# Patient Record
Sex: FEMALE | Race: WHITE | NOT HISPANIC OR LATINO | Employment: OTHER | URBAN - METROPOLITAN AREA
[De-identification: names, ages, dates, MRNs, and addresses within clinical notes are randomized per-mention and may not be internally consistent; named-entity substitution may affect disease eponyms.]

---

## 2017-01-20 ENCOUNTER — ALLSCRIPTS OFFICE VISIT (OUTPATIENT)
Dept: OTHER | Facility: OTHER | Age: 82
End: 2017-01-20

## 2017-02-22 ENCOUNTER — ALLSCRIPTS OFFICE VISIT (OUTPATIENT)
Dept: OTHER | Facility: OTHER | Age: 82
End: 2017-02-22

## 2017-03-23 ENCOUNTER — ALLSCRIPTS OFFICE VISIT (OUTPATIENT)
Dept: OTHER | Facility: OTHER | Age: 82
End: 2017-03-23

## 2017-03-27 ENCOUNTER — GENERIC CONVERSION - ENCOUNTER (OUTPATIENT)
Dept: OTHER | Facility: OTHER | Age: 82
End: 2017-03-27

## 2017-06-26 ENCOUNTER — ALLSCRIPTS OFFICE VISIT (OUTPATIENT)
Dept: OTHER | Facility: OTHER | Age: 82
End: 2017-06-26

## 2017-10-31 ENCOUNTER — ALLSCRIPTS OFFICE VISIT (OUTPATIENT)
Dept: OTHER | Facility: OTHER | Age: 82
End: 2017-10-31

## 2018-01-31 ENCOUNTER — CLINICAL SUPPORT (OUTPATIENT)
Dept: CARDIOLOGY CLINIC | Facility: CLINIC | Age: 83
End: 2018-01-31
Payer: COMMERCIAL

## 2018-01-31 DIAGNOSIS — Z95.818 PRESENCE OF OTHER CARDIAC IMPLANTS AND GRAFTS: ICD-10-CM

## 2018-01-31 DIAGNOSIS — Z86.73 PERSONAL HISTORY OF TRANSIENT ISCHEMIC ATTACK: Primary | ICD-10-CM

## 2018-01-31 PROCEDURE — 93299 PR REM INTERROG ICPMS/SCRMS <30 D TECH REVIEW: CPT | Performed by: INTERNAL MEDICINE

## 2018-01-31 PROCEDURE — 93298 REM INTERROG DEV EVAL SCRMS: CPT | Performed by: INTERNAL MEDICINE

## 2018-01-31 NOTE — PROGRESS NOTES
CARELINK TRANSMISSION: LOOP RECORDER  PRESENTING RHYTHM NSR @ 67 BPM  BATTERY STATUS "OK"  4 PAUSE AND 26 KATHY EPISODES W/ EGRAMS SHOWING UNDERSENSING  112 AF EPISODES W/ EGRAMS SHOWING SR W/ PACs ,PVCs AND PROBABLE AF @ 45-80 BPM  LONGEST EPISODE 4:32 HOURS  AF BURDEN=0 5%  PT TAKES ASA 81 AND XARELTO  NO PATIENT  ACTIVATED EPISODES  NORMAL DEVICE FUNCTION  DL       Current Outpatient Prescriptions:     amLODIPine (NORVASC) 5 mg tablet, 7 5 mg oral once daily(take 1 tablet of 5 mg and 1 tablet of 2 5 mg, Disp: 30 tablet, Rfl: 0    aspirin 81 MG tablet, Take 81 mg by mouth daily  , Disp: , Rfl:     atorvastatin (LIPITOR) 40 mg tablet, Take 40 mg by mouth daily  , Disp: , Rfl:     levothyroxine 100 mcg tablet, Take 100 mcg by mouth daily  , Disp: , Rfl:     Multiple Vitamins-Minerals (PRESERVISION AREDS 2 PO), Take by mouth daily  , Disp: , Rfl:     pantoprazole (PROTONIX) 40 mg tablet, Take 40 mg by mouth daily  , Disp: , Rfl:     rivaroxaban (XARELTO) tablet, Take 15 mg by mouth daily  , Disp: , Rfl:

## 2018-03-07 NOTE — PROGRESS NOTES
"  Discussion/Summary  Normal device function      Results/Data  Results   Cardiac Device Remote [id number] 07:49AM Jennifer Hutchison     Test Name Result Flag Reference   MISCELLANEOUS COMMENT (Report)     CARELINK TRANSMISSION (LOOP RECORDER);[id number] STATUS "OK"; PRESENTING = NSR WITH INTACT A-V CONDUCTION; NO PATIENT ACTIVATED EPISODES; (3) DEVICE CLASSIFIED "PAUSE" EPISODES, (2) DEVICE CLASSIFIED "KATHY" EPISODES & (276) DEVICE CLASSIFIED "AF" EPISODES (3 7% BURDEN) FOR UNDERSENSING, NSR & SB w/ PAC'S, PVC, PAF; PT WITH HX OF SAME - TAKES XARELTO; NORMAL DEVICE FUNCTION  eb   Cardiac Electrophysiology Report      wrppkyhvvtcedoflqbgtdksjmh3gqdz8c89j[id number]}  pdf   DEVICE TYPE Monitor       Cardiac Electrophysiology Report [id number] 07:49AM Jennifer Hutchison     Test Name Result Flag Reference   Cardiac Electrophysiology Report      hgungcoubfyfxczmegehzgsoeg6ykny2l60qy5b[id number]  pdf     Signatures   Electronically signed by : Elsa Houston, ; May 23 2016  2:58PM EST                       (Author)    Electronically signed by : Bhavya Vallejo DO; May 25 2016  8:00PM EST                       (Author)    "

## 2018-03-07 NOTE — PROGRESS NOTES
"  Discussion/Summary  Normal device function      Results/Data  Cardiac Device Remote [id number] 06:03PM Liliane Brewster     Test Name Result Flag Reference   MISCELLANEOUS COMMENT      CARELINK TRANSMISSION: (LOOP)X0A3 PAUSE EPISODES DETECTED  EGMS SHOW UNDERSENSING OF PVC'S  240 AF EPISODES DETECTED (3% BURDEN)  EGMS SHOW PAC AND PVC  PATIENT IS ON [place]  BATTERY STATUS "OK" ---JIMÉNEZ   Cardiac Electrophysiology Report      slh[id number]  pdf   DEVICE TYPE Monitor       Cardiac Electrophysiology Report [id number] 06:03PM Liliane Brewster     Test Name Result Flag Reference   Cardiac Electrophysiology Report      rcojcmoneeatcfcmlbumzsqwpa6kjcp2y80yc0t74v8j4[id number] pdf     Signatures   Electronically signed by : Destinee Shoemaker, ; Feb 23 2017  3:48PM EST                       (Author)    Electronically signed by : Jose Luis Elder, DO; Mar  3 2017  1:02PM EST                       (Author)    "

## 2018-03-07 NOTE — PROGRESS NOTES
"  Discussion/Summary  Normal device function      Results/Data  Results   Cardiac Device Remote [id number] 07:29PM Zo Busing     Test Name Result Flag Reference   MISCELLANEOUS COMMENT      CARELINK TRANSMISSION: (JOPM)E1V484 AF EPISODES DETECTED  ALL ARE NSR WITH PAC'S  HISTORY OF THE SAME  PATIENT IS ON Veronia Roch  SOME UNDERSENSED BEATS NOTED IN AF EPISODES  BATTERY STATUS "OK" ---JIMÉNEZ   Cardiac Electrophysiology Report      owklslzgyunooesyyocvhtiotb1zjkg3y04ay7z58o0v28so5ncy74ef[id number]}  pdf   DEVICE TYPE Monitor       Cardiac Electrophysiology Report [id number] 07:29PM [facility] Busing     Test Name Result Flag Reference   Cardiac Electrophysiology Report      aauwpgotwrfvjztpdcanszahkb0iais1v71su0p21f3x54fo5khl68ya[id number]  pdf     Signatures   Electronically signed by : Sharla Handy, ; Mar 16 2016  9:01AM EST                       (Author)    Electronically signed by : Mimi Quinn DO; Mar 19 2016  8:54PM EST                       (Author)    "

## 2018-03-07 NOTE — PROGRESS NOTES
"  Discussion/Summary  Normal device function       Sinus rhythm  sinus bradycardia      PAC  PVC    undersensing  carries a diagnosis of PAF and on anticoagulation     Results/Data  Cardiac Device Remote [id number] 01:46PM Blease      Test Name Result Flag Reference   MISCELLANEOUS COMMENT (Report)     CARELINK TRANSMISSION: (LOOP RECORDER)[id number] STATUS "OK"  39 AF EPISODES DETECTED WITH LONGEST DURATION @ 12 MIN  TOTAL BURDEN = 0 3%  EGMS SHOW PAF, PVC'S, PAC'S AND INTERMITTENT UNDERSENSING  PT TAKES XARELTO  1 PAUSE EPISODE PREV  ADDRESSED  PT ASYMPTOMATIC/SLEEPING  NORMAL DEVICE FUNCTION  eb   Cardiac Electrophysiology Report      slh[id number]_[id number]  pdf   DEVICE TYPE Monitor       Cardiac Electrophysiology Report [id number] 01:46PM Blease      Test Name Result Flag Reference   Cardiac Electrophysiology Report      vpmfbsctqphhdgstcbftclvszl2bpfm4r36ci2j04z1e76fm2lik45npl3j5w54g83e904xaduue8q569a07s848m  pdf     Signatures   Electronically signed by : Anai Nance, ; Jun 27 2017 12:44PM EST                       (Author)    Electronically signed by : Beryle Mares, M D ; Jun 30 2017  6:23AM EST                       (Author)    "

## 2018-03-07 NOTE — PROGRESS NOTES
"  Discussion/Summary  Normal device function     Small p waves on device and significant atrial ectopy present  probable afib at [facility]  Results/Data  Cardiac Device Remote [id number] 01:28PM Casper Ismael     Test Name Result Flag Reference   MISCELLANEOUS COMMENT (Report)     CARELINK TRANSMISSION: LOOP RECORDER  PRESENTING RHYTHM NSR @ 85 BPM  BATTERY STATUS "OK"  2 PAUSE EPISODES PREVIOUSLY ADDRESSED  138 AF EPISDOES W/ EGRAMS SHOWING PROBABLE AF @ 62-86 BPM  LONGEST EPISODE 24 MINS  PT Vallarie Niece  NO PATIENT ACTIVATED EPISODES  NORMAL DEVICE FUNCTION  DL   Cardiac Electrophysiology Report      [id number]  pdf   DEVICE TYPE Monitor       Cardiac Electrophysiology Report [id number] 01:28PM Casper Ismael     Test Name Result Flag Reference   Cardiac Electrophysiology Report      [id number]  pdf     Signatures   Electronically signed by : Shiva Soto, ; Oct 31 2017 10:58AM EST                       (Author)    Electronically signed by : Tl Newton DO; Nov 5 2017  9:45PM EST                       (Author)    "

## 2018-03-07 NOTE — PROGRESS NOTES
"  Discussion/Summary  Normal device function      Results/Data  Cardiac Device Remote [id number] 08:56PM [facility]     Test Name Result Flag Reference   MISCELLANEOUS COMMENT (Report)     CARELINK TRANSMISSION: LOOP RECORDER  PRESENTING RHYTHM NSR @ 77 BPM  BATTERY STATUS "OK"  8 PAUSE AND 13 KATHY EPISODES W/ EGRAMS SHOWING NSR W/ FRQUENT PVCs AND UNDERSENSING  520 AF EPISODES W/ EGRAMS SHOWING SR W/ FREQUENT PVCs AND PACs  CAN NOT R/O AFIB  PT IS ON XARELTO  NO PATIENT ACTIVATED EPISODES  NORMAL DEVICE FUNCTION  DL   Cardiac Electrophysiology Report      sl[id number]  pdf   DEVICE TYPE Monitor       Cardiac Electrophysiology Report [id number] 08:56PM [facility]     Test Name Result Flag Reference   Cardiac Electrophysiology Report      wjbmvbibplnlncryhjgvfgbmpt2tdfj1l43f[id number]  pdf     Signatures   Electronically signed by : Ellen Vigil, ; Nov 16 2016  7:45AM EST                       (Author)    Electronically signed by : Jefe Wallace DO; Nov 16 2016  6:10PM EST                       (Author)    "

## 2018-03-07 NOTE — PROGRESS NOTES
"  Discussion/Summary  Normal device function      Results/Data  Results   Cardiac Device Remote [id number] 11:40AM Zelphia Lung     Test Name Result Flag Reference   MISCELLANEOUS COMMENT (Report)     CARELINK TRANSMISSION (LOOP RECORDER);[id number] STATUS "OK"; PRESENTING = NSR w/INTACT A-V CONDUCTION; NO PATIENT ACTIVATED EPISODES; (1) DEVICE DETECTED PAUSE EPISODE WITH R WAVE UNDERSENDING, SBRADY; (42) DEVICE DETECTED AF EPISODES (0 7% BURDEN) FOR PAF, NSR w/PAC, PVC, "NOISE"; HX OF SAME - PT TAKES XARELTO  eb   Cardiac Electrophysiology Report      tlxwhgdkdetdebxhkoqmbczdan6wajq2b94dk9a37x2j70yi8quo00zl[id number]}  pdf   DEVICE TYPE Monitor       Cardiac Electrophysiology Report [id number] 11:40AM Zelphia Lung     Test Name Result Flag Reference   Cardiac Electrophysiology Report      mzljcgvsjsbxxalqaorujfavbr0tmvx4g46kc8v38u7z16lw5ceg7[id number]  pdf     Signatures   Electronically signed by : Carolyn Yoder, ; Jul 1 2016  9:47AM EST                       (Author)    Electronically signed by : Rosa Nicole DO; Jul  3 2016 11:36AM EST                       (Author)    "

## 2018-03-07 NOTE — PROGRESS NOTES
"  Discussion/Summary  Normal device function     Agree with above  small p-waves so difficult to discriminate af from sinus with frequent ectopy  Results/Data  Cardiac Device Remote [id number] 07:02PM Tiffanyjennifer Chavez     Test Name Result Flag Reference   MISCELLANEOUS COMMENT      CARELINK TRANSMISSION: (LOOP)O5JXOGRIHY STATUS "OK"  141 AF EPISODES DETECTED LONGEST 32 MIN  (2% BURDEN)  EGMS SHOW PAF, PVC'S AND PAC'S  UNDERSENSING ALSO NOTED  PATIENT IS ON XARELTO  ---JIMÉNEZ   Cardiac Electrophysiology Report      slh[id number]_[id number]  pdf   DEVICE TYPE Monitor       Cardiac Electrophysiology Report [id number] 07:02PM Tiffany Chavez     Test Name Result Flag Reference   Cardiac Electrophysiology Report      xwnznfjwaxzvodjzhmdfcgfvvi5efkc3y45eh0b40b3[id number]  pdf     Signatures   Electronically signed by : Guillermina Bunch, ; Apr  3 2017 11:01AM EST                       (Author)    Electronically signed by : Li Lowe DO;  Apr 8 2017  4:50PM EST                       (Author)    "

## 2018-03-07 NOTE — PROGRESS NOTES
"  Discussion/Summary  Normal device function and Abnormal Device Function     Undersensing  PAC  PVC    has h/o A fib  Results/Data  Cardiac Device Remote [id number] 09:28PM Radha Banks     Test Name Result Flag Reference   MISCELLANEOUS COMMENT (Report)     CARELINK TRANSMISSION: (LOOP)X0A9 PAUSE EPISODES DETECTED  C/ [address] ALL EGMS SHOW UNDERSENSING  1469 AF EPISODES DETECTED  LONGEST 3 HOURS  EGMS SHOW PAC, PVC, HISTORY OF PAF AND PATIENT IS ON Pasqual Fore  NO PATIENT ACTIVATED EPISODES  BATTERY STATUS "OK" ---JIMÉNEZ   Cardiac Electrophysiology Report      sl[id number]_[id number]  pdf   DEVICE TYPE Monitor       Cardiac Electrophysiology Report [id number] 09:28PM Radha Banks     Test Name Result Flag Reference   Cardiac Electrophysiology Report      iqvxocjfsutocrpxbenxmxdveg3fzqc0k8[id number]  pdf     Signatures   Electronically signed by : Betty Lindo, ; Sep  9 2016 10:57AM EST                       (Author)    Electronically signed by : NENA Tapia ; Sep 17 2016 10:52PM EST                       (Author)    "

## 2018-03-07 NOTE — PROGRESS NOTES
"  Discussion/Summary  Normal device function     AF innapropriately labelled at times  Results/Data  Cardiac Device Remote 20Jan2017 07:51PM Yelena Belinda     Test Name Result Flag Reference   MISCELLANEOUS COMMENT      CARELINK TRANSMISSION: (LOOP)X0A6 PAUSE EPISODES DETECTED  EGMS SHOW UNDERSENSING OF PVC'S  377 AF EPISODES DETECTED (5% BURDEN)  EGMS SHOW PAC AND PVC  PATIENT IS ON Dietra Greek  BATTERY STATUS "OK" ---JIMÉNEZ   Cardiac Electrophysiology Report      slh[id number]  pdf   DEVICE TYPE Monitor       Cardiac Electrophysiology Report [id number] 07:51PM Yelena Trevino     Test Name Result Flag Reference   Cardiac Electrophysiology Report      jzttnwcizwofjqfbzlrkrcygmx4jpke6b84xz9f83k4v79dg8vos2[id number]  pdf     Signatures   Electronically signed by : Edna Jenkins, ; Jan 20 2017  3:50PM EST                       (Author)    Electronically signed by : Kendall Lei DO; Jan 20 2017  7:28PM EST                       (Author)    "

## 2018-03-07 NOTE — PROGRESS NOTES
"  Discussion/Summary  Normal device function      Results/Data  Results   Cardiac Device Remote 19Apr2016 06:43PM Zelphia Lung     Test Name Result Flag Reference   MISCELLANEOUS COMMENT (Report)     CARELINK TRANSMISSION: LOOP RECORDER  PRESENTING RHYTHM NSR @ 72 BPM  BATTERY STATUS "OK"  68 AF EPISODES W/ EGRAMS SHOWING NSR /SB W/ PACs & PVCs vs PAF HR AS LOW AS 34 BPM DURING WAKING HOURS  UP TO 16 MINS  PT Luisearl Vazquez  NORMAL DEVICE FUNCTION  DL/CP   Cardiac Electrophysiology Report      xiuozdgkbywwsuzztggyustxcm6ntcz8h34wn5l05p0[id number]{[id number]}  pdf   DEVICE TYPE Monitor       Cardiac Electrophysiology Report [id number] 06:43PM Zelphia Lung     Test Name Result Flag Reference   Cardiac Electrophysiology Report      yyzwbpsnszqqazktwsowlquote5tkhc5h98zp8r87l[id number] pdf     Signatures   Electronically signed by : Tarsha Christie, ; Apr 20 2016 11:37AM EST                       (Author)    Electronically signed by : Rosa Nicole DO; Jun 5 2016  1:38PM EST                       (Author)    "

## 2018-03-07 NOTE — PROGRESS NOTES
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  Results/Data  Cardiac Device Remote [id number] 04:05AM Caspre Ismael     Test Name Result Flag Reference   MISCELLANEOUS COMMENT      NONIBLLABLE- CARELINK TRANSMISSION ALERT FOR TACHY EPISODE LASTING 8 [address] @ 182 BPM  PAF W/ RVR ON ECG  [facility]  Cardiac Electrophysiology Report      sl[id number]_[id number]  pdf   DEVICE TYPE Monitor       Cardiac Electrophysiology Report [id number] 04:05AM Casper Ismael     Test Name Result Flag Reference   Cardiac Electrophysiology Report      gljetiqnleheqbxvecmczukfaa9fqsh9c18bi1z66o2h56ax6wdw58iqa59111o6756oo0780ggoj0k8a9z88f97l  pdf     Signatures   Electronically signed by : Sarahi Lira RN; Mar 27 2017  8:12AM EST                       (Author)    Electronically signed by : Tl Newton DO;  Apr 1 2017  7:40PM EST                       (Author)    "

## 2018-03-07 NOTE — PROGRESS NOTES
"  Discussion/Summary  Normal device function     Episodes paf present  Results/Data  Results   Cardiac Device Remote [id number] 05:27PM Yelenacamryn Medleyro     Test Name Result Flag Reference   MISCELLANEOUS COMMENT      CARELINK TRANSMISSION:(LOOP)L8W116 AF EPISODES DETECTED  EGMS APPEAR TO BE NSR W/PAC'S  PATIENT IS ON XARELTO  1 KATHY EPISODE AND 3 PAUSE EPISODE DETECTED  ALL APPEAR TO BE UNDERSENSING  BATTERY STATUS "OK" ---JIMÉNEZ   Cardiac Electrophysiology Report      awxhhugxdndopqtarbgwtsfdwu9gxln0a96au3n52i4m82yu3vbu94coh87194i7h6lzp461dxw531cw48g2yf3tp{[id number]}  pdf   DEVICE TYPE Monitor       Cardiac Electrophysiology Report [id number] 05:27PM Yelena Trevino     Test Name Result Flag Reference   Cardiac Electrophysiology Report      veqmakzwgcuomakkjsneypspwp9xljy1v40gs9h18p6n70bz6aue97pcu96041q3m2qsu137iox773qd20x3ma6og  pdf     Signatures   Electronically signed by : Edna Jenkins, ; Feb 8 2016  3:35PM EST                       (Author)    Electronically signed by : Kendall Lei DO; Feb 14 2016  7:30AM EST                       (Author)    "

## 2018-03-07 NOTE — PROGRESS NOTES
"  Discussion/Summary  Normal device function and Abnormal Device Function     Sinus rhythm  sinus bradycardia      PAC  PVC    undersensing  Results/Data  Cardiac Device Remote 11Oct2016 06:39PM Tiffanie Limon     Test Name Result Flag Reference   MISCELLANEOUS COMMENT      CARELINK TRANSMISSION: BATTERY STATUS "OK"  DEVICE CLASSIFIED 27 PAUSES, 354 KATHY & 1,024 AF EPISODES  SR/SB W/ PVC'S & UNDERSENSING ON AVAILABLE ECG'S  HX: PAF & ON XARELTO  NO PT ACTIVATED EPISODES  PRESENTING RHYTHM SB @ 55 BPM  GV   Cardiac Electrophysiology Report      slh[id number]_[id number]  pdf   DEVICE TYPE Monitor       Cardiac Electrophysiology Report [id number] 06:39PM Tiffanie Limon     Test Name Result Flag Reference   Cardiac Electrophysiology Report      zsbqqqoiblmtjrbxpoetulycoc2qbfy0y32xe9o10h2g03ri9ovq29[id number]  pdf     Signatures   Electronically signed by : Osiris Crespo RN; Oct 12 2016 12:47PM EST                       (Author)    Electronically signed by : NENA Chow ; Oct 22 2016  9:59AM EST                       (Author)    "

## 2018-03-07 NOTE — PROGRESS NOTES
"  Discussion/Summary  Normal device function      Results/Data  Cardiac Device Remote [id number] 08:58AM Madie Ege     Test Name Result Flag Reference   MISCELLANEOUS COMMENT (Report)     CARELINK TRANSMISSION (LOOP RECORDER); PRESENTING = NSR WITH INTACT AV CONDUCTION; NO PATIENT ACTIVATED EPISODES; (17) DEVICE DETECTED AF EPISODES WITH MULTIPLE EPISODES OCCURRING SAME DAY WITH TOTAL AF BURDEN = 0 6%; ECG'S STORED FOR NSR, SB, PAC'S, PVC'S (NOT AF) ; PT TAKES XARELTO; NORMAL DEVICE FUNCTION  eb   Cardiac Electrophysiology Report      slhb[id number]_[id number]  pdf   DEVICE TYPE Monitor       Cardiac Electrophysiology Report [id number] 08:58AM Madie Ege     Test Name Result Flag Reference   Cardiac Electrophysiology Report      whtbaajqgtynjhpxfmekqxyrlx9nzvz2l42nv8y20f0[id number]  pdf     Signatures   Electronically signed by : Aura Fernandez, ; Dec 21 2016  1:45PM EST                       (Author)    Electronically signed by : Bindu Faria DO; Jan 2 2017  9:07AM EST                       (Author)    "

## 2018-05-08 ENCOUNTER — IN-CLINIC DEVICE VISIT (OUTPATIENT)
Dept: CARDIOLOGY CLINIC | Facility: CLINIC | Age: 83
End: 2018-05-08
Payer: COMMERCIAL

## 2018-05-08 DIAGNOSIS — Z86.73 PERSONAL HISTORY OF TRANSIENT ISCHEMIC ATTACK: Primary | ICD-10-CM

## 2018-05-08 DIAGNOSIS — Z95.818 PRESENCE OF OTHER CARDIAC IMPLANTS AND GRAFTS: ICD-10-CM

## 2018-05-08 PROCEDURE — 93298 REM INTERROG DEV EVAL SCRMS: CPT | Performed by: INTERNAL MEDICINE

## 2018-05-08 PROCEDURE — 93299 PR REM INTERROG ICPMS/SCRMS <30 D TECH REVIEW: CPT | Performed by: INTERNAL MEDICINE

## 2018-05-08 NOTE — PROGRESS NOTES
MDT LOOP  CARELINK TRANSMISSION: LOOP RECORDER  PRESENTING RHYTHM NSR @ 79 BPM  BATTERY STATUS "OK"  3 KATHY EPISODES W/ EGRAMS SHOWING SR W/ PACs AND UNSENSED PVCs [ Alben Sane  61 AF EPISODES W/ EGRAMS SHOWING SR , PACs, PVCs [COUPLETS AT TIMES], AF  AF BURDEN = 0 4%  PT Michelle Dimas  NO PATIENT  ACTIVATED EPISODES  NORMAL DEVICE FUNCTION   DL

## 2018-07-19 DIAGNOSIS — I47.1 SVT (SUPRAVENTRICULAR TACHYCARDIA) (HCC): ICD-10-CM

## 2018-07-19 DIAGNOSIS — I63.9 CEREBROVASCULAR ACCIDENT (CVA), UNSPECIFIED MECHANISM (HCC): Primary | ICD-10-CM

## 2018-07-23 ENCOUNTER — HOSPITAL ENCOUNTER (OUTPATIENT)
Dept: NON INVASIVE DIAGNOSTICS | Facility: HOSPITAL | Age: 83
Discharge: HOME | End: 2018-07-23
Attending: INTERNAL MEDICINE | Admitting: INTERNAL MEDICINE
Payer: COMMERCIAL

## 2018-07-23 VITALS
HEIGHT: 62 IN | BODY MASS INDEX: 35.88 KG/M2 | DIASTOLIC BLOOD PRESSURE: 67 MMHG | TEMPERATURE: 97.8 F | SYSTOLIC BLOOD PRESSURE: 133 MMHG | RESPIRATION RATE: 20 BRPM | WEIGHT: 195 LBS | HEART RATE: 70 BPM | OXYGEN SATURATION: 95 %

## 2018-07-23 DIAGNOSIS — I47.1 SVT (SUPRAVENTRICULAR TACHYCARDIA) (HCC): ICD-10-CM

## 2018-07-23 DIAGNOSIS — I63.9 CEREBROVASCULAR ACCIDENT (CVA), UNSPECIFIED MECHANISM (HCC): ICD-10-CM

## 2018-07-23 PROCEDURE — 33284 PR RMVL IMPLANTABLE PT-ACTIVATED CAR EVENT RECORDER: CPT | Performed by: INTERNAL MEDICINE

## 2018-07-23 PROCEDURE — 33284 HB REMOVE PAT-ACTIVE HT RECORD: CPT | Performed by: INTERNAL MEDICINE

## 2018-07-23 RX ORDER — HYDROCHLOROTHIAZIDE 12.5 MG/1
12.5 TABLET ORAL DAILY
COMMUNITY
End: 2021-01-01 | Stop reason: HOSPADM

## 2018-07-23 RX ORDER — LIDOCAINE HYDROCHLORIDE AND EPINEPHRINE 10; 10 MG/ML; UG/ML
INJECTION, SOLUTION INFILTRATION; PERINEURAL CODE/TRAUMA/SEDATION MEDICATION
Status: COMPLETED | OUTPATIENT
Start: 2018-07-23 | End: 2018-07-23

## 2018-07-23 RX ORDER — LIDOCAINE HYDROCHLORIDE 10 MG/ML
INJECTION, SOLUTION INFILTRATION; PERINEURAL CODE/TRAUMA/SEDATION MEDICATION
Status: COMPLETED | OUTPATIENT
Start: 2018-07-23 | End: 2018-07-23

## 2018-07-23 RX ADMIN — LIDOCAINE HYDROCHLORIDE AND EPINEPHRINE 20 ML: 10; 10 INJECTION, SOLUTION INFILTRATION; PERINEURAL at 10:29

## 2018-07-23 RX ADMIN — LIDOCAINE HYDROCHLORIDE 5 ML: 10 INJECTION, SOLUTION INFILTRATION; PERINEURAL at 10:36

## 2018-07-23 NOTE — DISCHARGE INSTRUCTIONS
Keep loop recorder incision dry for one week  Do not use lotions/powders/creams on incision  Remove outer bandage 48 hours after procedure - if present, leave underlying steri-strips in place, they will either fall off on their own or will be removed at 2 week follow up appointment  Please call the office if you notice redness, swelling, bleeding, or drainage from incision or if you develop fevers

## 2018-07-23 NOTE — H&P
H&P Exam - Cardiology   Lorraine Espinosa 80 y o  female MRN: [id number]  Unit/Bed#: [facility] 01-01 Encounter: [id number]    Assessment/Plan     Assessment:  1  Paroxysmal atrial fibrillation, on Xarelto anticoagulation   A ) loop recorder in situ, recently reached elective replacement indicator  2  History of preserved LV systolic function per echo 2015   A ) known moderate aortic stenosis  3  Hypertension  4  Hyperlipidemia  5  Prior TIA  6  Anemia    Plan:  1  Loop recorder explantation      History of Present Illness   HPI:  Lorraine Espinosa is a 80y o  year old female with a history of prior TIA, hypertension, hyperlipidemia, and anemia  She has history of prior TIA/CVA, and had a loop recorder implanted thereafter to monitor for atrial fibrillation  She was found to have paroxysmal atrial fibrillation, and has been on Xarelto anticoagulation  Through recent device interrogations, her loop recorder has reached elective replacement indicator and she presents today for device explantation  Review of Systems  ROS as noted above, otherwise 12 point review of systems was performed and is negative  Historical Information   Past Medical History:   Diagnosis Date    Atheroma of artery     aorta    History of stroke     bifrontal CVA    Hypertension     Hypothyroidism     Intrahepatic bile duct stones     Irritable bowel syndrome     Murmur     Stroke [facility])      Past Surgical History:   Procedure Laterality Date    APPENDECTOMY      CARDIAC SURGERY      reveal linq cardiac monitor 5-26-15    CARPAL TUNNEL RELEASE      CHOLECYSTECTOMY      ERCP N/A 3/21/2016    Procedure: ENDOSCOPIC RETROGRADE CHOLANGIOPANCREATOGRAPHY (ERCP); Surgeon: Beatriz Atkinson MD;  [facility]; Service:     MI EGD TRANSORAL BIOPSY SINGLE/MULTIPLE N/A 3/21/2016    Procedure: RADIAL ENDOSCOPIC U/S;  Surgeon: Beatriz Atkinson MD;  [facility];   Service: Gastroenterology    MI ESOPHAGOGASTRODUODENOSCOPY TRANSORAL DIAGNOSTIC N/A 5/12/2016    Procedure: ESOPHAGOGASTRODUODENOSCOPY (EGD); Surgeon: Jenny Corbett MD;  [facility]; Service: Gastroenterology    TONSILLECTOMY       Family History:   Family History   Problem Relation Age of Onset    Stroke Mother        Social History   History   Alcohol Use No     History   Drug Use No     History   Smoking Status    Former Smoker   Smokeless Tobacco    Not on file         Meds/Allergies   all medications and allergies reviewed  Home Medications:   Prescriptions Prior to Admission   Medication    amLODIPine (NORVASC) 5 mg tablet    aspirin 81 MG tablet    atorvastatin (LIPITOR) 40 mg tablet    hydrochlorothiazide (HYDRODIURIL) 12 5 mg tablet    levothyroxine 100 mcg tablet    Multiple Vitamins-Minerals (PRESERVISION AREDS 2 PO)    rivaroxaban (XARELTO) tablet       Allergies   Allergen Reactions    Tetanus Toxoids        Objective   Vitals: Blood pressure 138/63, pulse 74, temperature 97 8 °F (36 6 °C), temperature source Oral, resp  rate 18, height 5' 2" (1 575 m), weight 88 5 kg (195 lb), SpO2 94 %, not currently breastfeeding  Orthostatic Blood Pressures      Most Recent Value   Blood Pressure  138/63 filed at 07/23/2018 0806   Patient Position - Orthostatic VS  Sitting filed at 07/23/2018 1231          No intake or output data in the 24 hours ending 07/23/18 0942    Invasive Devices          No matching active lines, drains, or airways          Physical Exam  Physical Exam:   GEN: NAD, alert and oriented, well appearing  SKIN: dry without significant lesions or rashes  HEENT: NCAT, PERRL, EOMs intact  CARDIOVASCULAR: RRR  LUNGS: unlabored, good effort  ABDOMEN: Soft, nontender, nondistended  EXTREMITIES/VASCULAR: perfused without clubbing, cyanosis, or edema b/l  PSYCH: Normal mood and affect  NEURO: CN ll-Xll grossly intact      Lab Results: I have personally reviewed pertinent lab results            No recent labs              Imaging: I have personally reviewed pertinent reports  Results for orders placed in visit on 03/14/15   Echo complete with contrast if indicated    Narrative Drew Meyer, [address]   Phone: [phone]   TRANSTHORACIC ECHOCARDIOGRAM   2D, M-MODE, DOPPLER, AND COLOR DOPPLER   Study date:  16-Mar-2015   Patient: Kelley Jones   MR number: [id number]   Account number: [de-identified]   : 05-Oct-1928   Age: 80 years   Gender: Female   Status: Inpatient   Location: Echo lab   Height: 64 in   Weight: 170 7 lb   BP: 152/ 88 mmHg   Indications: TIA  Diagnoses: 435 9 - TRANS CEREB ISCHEMIA NOS   Sonographer:  Finesse Alford [facility]   Primary Physician:  Edna Wolf MD   Referring Physician:  Siena Landaverde MD   [facility]   Interpreting Physician:  Mariam Russell MD   SUMMARY   LEFT VENTRICLE:   Systolic function was normal by EF (single plane method of disks)  Ejection   fraction was estimated to be 57 %  There were no regional wall motion abnormalities  Wall thickness was mildly increased  Doppler parameters were consistent with abnormal left ventricular relaxation   (grade 1 diastolic dysfunction)  LEFT ATRIUM:   The atrium was mildly to moderately dilated  RIGHT ATRIUM:   The atrium was mildly dilated  MITRAL VALVE:   There was moderate annular calcification  There was mild thickening  There was mild regurgitation  AORTIC VALVE:   The valve was trileaflet  Leaflets exhibited moderately increased thickness,   TRANSTHORACIC ECHOCARDIOGRAM   Patient: JAZMINE BLUM   MR number: [id number]    ------ Page 2   moderate calcification, and mildly reduced cuspal separation  Transaortic   velocity and gradient were increased due to stenosis as well as concomitant   increased transaortic flow  There was moderate stenosis  There was mild regurgitation  Valve mean gradient was 22 mmHg  Estimated aortic valve area (by VTI) was 1 36 cm squared  HISTORY: PRIOR HISTORY: Risk factors: hypertension  PROCEDURE: The procedure was performed in the echo lab  This was a routine   study  The transthoracic approach was used  The study included complete 2D   imaging, M-mode, complete spectral Doppler, and color Doppler  Image quality   was good  LEFT VENTRICLE: Size was normal  Systolic function was normal by EF (single   plane method of disks)  Ejection fraction was estimated to be 57 %  There were   no regional wall motion abnormalities  Wall thickness was mildly increased  DOPPLER: There was an increased relative contribution of atrial contraction to   ventricular filling  Doppler parameters were consistent with abnormal left   ventricular relaxation (grade 1 diastolic dysfunction)  RIGHT VENTRICLE: The size was normal  Systolic function was normal  DOPPLER:   Systolic pressure was within the normal range  Estimated peak pressure was 26   mmHg  LEFT ATRIUM: The atrium was mildly to moderately dilated  RIGHT ATRIUM: The atrium was mildly dilated  MITRAL VALVE: There was moderate annular calcification  Valve structure was   normal  There was mild thickening  There was mild calcification  There was   normal leaflet separation  DOPPLER: The transmitral velocity was within the   normal range  There was no evidence for stenosis  There was mild    regurgitation  AORTIC VALVE: The valve was trileaflet  Leaflets exhibited moderately    increased   thickness, moderate calcification, and mildly reduced cuspal separation  DOPPLER: Transaortic velocity and gradient were increased due to stenosis as   well as concomitant increased transaortic flow  There was moderate stenosis  There was mild regurgitation  TRICUSPID VALVE: The valve structure was normal  There was normal leaflet   separation  DOPPLER: The transtricuspid velocity was within the normal range  There was no evidence for stenosis  There was no regurgitation  PULMONIC VALVE: Leaflets exhibited normal thickness, no calcification, and   normal cuspal separation  DOPPLER: The transpulmonic velocity was within the   normal range  There was no regurgitation  PERICARDIUM: There was no pericardial effusion  TRANSTHORACIC ECHOCARDIOGRAM   Patient: JAZMINE Layton   MR number: [id number]    ------ Page 3   AORTA: The root exhibited normal size  SYSTEMIC VEINS: IVC: The inferior vena cava was normal in size and course     Respirophasic changes were normal    MEASUREMENT TABLES   2D MEASUREMENTS   LVOT   (Reference normals)   Diam   19 mm   (--)   DOPPLER MEASUREMENTS   LVOT   (Reference normals)   VTI   36 cm   (--)   Stroke vol   102 07 ml   (--)   Aortic valve   (Reference normals)   Peak marvin   331 cm/s   (--)   VTI   75 cm   (--)   Peak gradient   44 mmHg   (--)   Mean gradient   22 mmHg   (--)   Obstr index, VTI   0 48    (--)   Valve area, VTI   1 36 cm squared   (--)   SYSTEM MEASUREMENT TABLES   2D   %FS: 29 48 %   Ao Diam: 2 9 cm   EDV(Teich): 102 51 ml   EF(Teich): 56 43 %   ESV(Cube): 36 47 ml   ESV(Teich): 44 66 ml   IVSd: 1 21 cm   LA Area: 25 62 cm2   LA Diam: 4 15 cm   LVEDV MOD A4C: 89 92 ml   LVEF MOD A4C: 56 82 %   LVESV MOD A4C: 38 83 ml   LVIDd: 4 7 cm   LVIDs: 3 32 cm   LVLd A4C: 7 39 cm   LVLs A4C: 6 24 cm   LVOT Diam: 2 09 cm   LVPWd: 1 21 cm   RA Area: 19 72 cm2   RV Diam : 3 11 cm   SI(Cube): 36 91 ml/m2   SI(Teich): 31 61 ml/m2   SV MOD A4C: 51 09 ml   TRANSTHORACIC ECHOCARDIOGRAM   Patient: JAZMINE BLUM   MR number: [id number]    ------ Page 4   SV(Cube): 67 54 ml   SV(Teich): 57 84 ml   CW   AR Dec Mills: 3 41 m/s2   AR Dec Time: 1940 31 ms   AR PHT: 562 69 ms   AR Vmax: 4 94 m/s   AR maxP 48 mmHg   AV VTI: 81 04 cm   AV Vmax: 3 32 m/s   AV Vmean: 2 38 m/s   AV maxP 14 mmHg   AV meanP 22 mmHg   TR Vmax: 2 29 m/s   TR maxP 93 mmHg   MM   TAPSE: 2 41 cm   PW   ELEAZAR (VTI): 1 65 cm2   ELEAZAR Vmax: 1 34 cm2 E': 0 04 m/s   E/E': 23 34   LVOT VTI: 38 95 cm   LVOT Vmax: 1 3 m/s   LVOT Vmean: 0 86 m/s   LVOT maxP 71 mmHg   LVOT meanPG: 3 41 mmHg   MV A Thomas: 1 46 m/s   MV Dec Ada: 2 m/s2   MV DecT: 520 37 ms   MV E Thomas: 1 04 m/s   MV E/A Ratio: 0 72   [facility] Accredited Echocardiography Laboratory   Prepared and electronically signed by   Drake Jordan MD   Signed 16-Mar-2015 18:14:57        Code Status: Prior

## 2018-08-07 ENCOUNTER — IN-CLINIC DEVICE VISIT (OUTPATIENT)
Dept: CARDIOLOGY CLINIC | Facility: CLINIC | Age: 83
End: 2018-08-07

## 2018-08-07 DIAGNOSIS — I63.9 CRYPTOGENIC STROKE (HCC): Primary | ICD-10-CM

## 2018-08-07 PROCEDURE — 99024 POSTOP FOLLOW-UP VISIT: CPT | Performed by: INTERNAL MEDICINE

## 2018-08-07 NOTE — PROGRESS NOTES
EXPLANTED LOOP SITE:  WOUND CHECK: INCISION CLEAN AND DRY WITH EDGES APPROXIMATED;WOUND CARE AND RESTRICTIONS REVIEWED WITH PATIENT   [address]

## 2021-01-01 ENCOUNTER — DOCUMENTATION (OUTPATIENT)
Dept: SOCIAL WORK | Facility: HOSPITAL | Age: 86
End: 2021-01-01

## 2021-01-01 ENCOUNTER — APPOINTMENT (EMERGENCY)
Dept: RADIOLOGY | Facility: HOSPITAL | Age: 86
DRG: 181 | End: 2021-01-01
Payer: COMMERCIAL

## 2021-01-01 ENCOUNTER — APPOINTMENT (INPATIENT)
Dept: RADIOLOGY | Facility: HOSPITAL | Age: 86
DRG: 181 | End: 2021-01-01
Payer: COMMERCIAL

## 2021-01-01 ENCOUNTER — IMMUNIZATIONS (OUTPATIENT)
Dept: FAMILY MEDICINE CLINIC | Facility: HOSPITAL | Age: 86
End: 2021-01-01

## 2021-01-01 ENCOUNTER — HOSPITAL ENCOUNTER (INPATIENT)
Facility: HOSPITAL | Age: 86
LOS: 11 days | Discharge: HOME HEALTH | DRG: 181 | End: 2021-08-30
Attending: EMERGENCY MEDICINE | Admitting: HOSPITALIST
Payer: COMMERCIAL

## 2021-01-01 ENCOUNTER — APPOINTMENT (EMERGENCY)
Dept: RADIOLOGY | Facility: HOSPITAL | Age: 86
DRG: 871 | End: 2021-01-01
Payer: COMMERCIAL

## 2021-01-01 ENCOUNTER — HOSPITAL ENCOUNTER (INPATIENT)
Facility: HOSPITAL | Age: 86
LOS: 1 days | DRG: 871 | End: 2021-09-03
Attending: EMERGENCY MEDICINE | Admitting: INTERNAL MEDICINE
Payer: COMMERCIAL

## 2021-01-01 ENCOUNTER — APPOINTMENT (INPATIENT)
Dept: NON INVASIVE DIAGNOSTICS | Facility: HOSPITAL | Age: 86
DRG: 181 | End: 2021-01-01
Payer: COMMERCIAL

## 2021-01-01 VITALS
SYSTOLIC BLOOD PRESSURE: 136 MMHG | BODY MASS INDEX: 26.98 KG/M2 | HEIGHT: 62 IN | TEMPERATURE: 99.2 F | DIASTOLIC BLOOD PRESSURE: 58 MMHG | OXYGEN SATURATION: 85 % | RESPIRATION RATE: 30 BRPM | WEIGHT: 146.61 LBS | HEART RATE: 114 BPM

## 2021-01-01 VITALS
OXYGEN SATURATION: 90 % | HEIGHT: 62 IN | SYSTOLIC BLOOD PRESSURE: 124 MMHG | TEMPERATURE: 98.4 F | WEIGHT: 170 LBS | DIASTOLIC BLOOD PRESSURE: 82 MMHG | BODY MASS INDEX: 31.28 KG/M2 | RESPIRATION RATE: 20 BRPM | HEART RATE: 105 BPM

## 2021-01-01 DIAGNOSIS — N17.9 AKI (ACUTE KIDNEY INJURY) (HCC): Primary | ICD-10-CM

## 2021-01-01 DIAGNOSIS — G47.00 INSOMNIA: ICD-10-CM

## 2021-01-01 DIAGNOSIS — C78.7 NON-SMALL CELL LUNG CANCER METASTATIC TO LIVER (HCC): ICD-10-CM

## 2021-01-01 DIAGNOSIS — Z71.89 COUNSELING REGARDING GOALS OF CARE: ICD-10-CM

## 2021-01-01 DIAGNOSIS — R91.8 LUNG MASS: ICD-10-CM

## 2021-01-01 DIAGNOSIS — Z23 ENCOUNTER FOR IMMUNIZATION: Primary | ICD-10-CM

## 2021-01-01 DIAGNOSIS — C79.9 METASTATIC DISEASE (HCC): ICD-10-CM

## 2021-01-01 DIAGNOSIS — U07.1 ACUTE RESPIRATORY FAILURE DUE TO COVID-19 (HCC): ICD-10-CM

## 2021-01-01 DIAGNOSIS — R62.7 FTT (FAILURE TO THRIVE) IN ADULT: ICD-10-CM

## 2021-01-01 DIAGNOSIS — C34.90 NON-SMALL CELL LUNG CANCER METASTATIC TO LIVER (HCC): ICD-10-CM

## 2021-01-01 DIAGNOSIS — J96.00 ACUTE RESPIRATORY FAILURE DUE TO COVID-19 (HCC): ICD-10-CM

## 2021-01-01 DIAGNOSIS — G89.3 CANCER ASSOCIATED PAIN: ICD-10-CM

## 2021-01-01 DIAGNOSIS — E83.52 HYPERCALCEMIA: ICD-10-CM

## 2021-01-01 DIAGNOSIS — K21.9 GERD (GASTROESOPHAGEAL REFLUX DISEASE): ICD-10-CM

## 2021-01-01 DIAGNOSIS — K59.00 CONSTIPATION: ICD-10-CM

## 2021-01-01 DIAGNOSIS — U07.1 COVID-19: Primary | ICD-10-CM

## 2021-01-01 DIAGNOSIS — Z23 ENCOUNTER FOR IMMUNIZATION: ICD-10-CM

## 2021-01-01 DIAGNOSIS — R16.0 LIVER MASS: ICD-10-CM

## 2021-01-01 LAB
ALBUMIN SERPL BCP-MCNC: 1.5 G/DL (ref 3.5–5)
ALBUMIN SERPL BCP-MCNC: 1.8 G/DL (ref 3.5–5)
ALBUMIN SERPL BCP-MCNC: 1.9 G/DL (ref 3.5–5)
ALP SERPL-CCNC: 152 U/L (ref 46–116)
ALP SERPL-CCNC: 187 U/L (ref 46–116)
ALP SERPL-CCNC: 191 U/L (ref 46–116)
ALT SERPL W P-5'-P-CCNC: 14 U/L (ref 12–78)
ALT SERPL W P-5'-P-CCNC: 17 U/L (ref 12–78)
ALT SERPL W P-5'-P-CCNC: 31 U/L (ref 12–78)
ANION GAP SERPL CALCULATED.3IONS-SCNC: 10 MMOL/L (ref 4–13)
ANION GAP SERPL CALCULATED.3IONS-SCNC: 3 MMOL/L (ref 4–13)
ANION GAP SERPL CALCULATED.3IONS-SCNC: 4 MMOL/L (ref 4–13)
ANION GAP SERPL CALCULATED.3IONS-SCNC: 4 MMOL/L (ref 4–13)
ANION GAP SERPL CALCULATED.3IONS-SCNC: 5 MMOL/L (ref 4–13)
ANION GAP SERPL CALCULATED.3IONS-SCNC: 5 MMOL/L (ref 4–13)
ANION GAP SERPL CALCULATED.3IONS-SCNC: 6 MMOL/L (ref 4–13)
APTT PPP: 45 SECONDS (ref 23–37)
AST SERPL W P-5'-P-CCNC: 17 U/L (ref 5–45)
AST SERPL W P-5'-P-CCNC: 24 U/L (ref 5–45)
AST SERPL W P-5'-P-CCNC: 60 U/L (ref 5–45)
ATRIAL RATE: 113 BPM
ATRIAL RATE: 117 BPM
ATRIAL RATE: 147 BPM
ATRIAL RATE: 78 BPM
ATRIAL RATE: 82 BPM
BACTERIA UR CULT: NORMAL
BACTERIA UR QL AUTO: ABNORMAL /HPF
BACTERIA UR QL AUTO: ABNORMAL /HPF
BASE EX.OXY STD BLDV CALC-SCNC: 47.2 % (ref 60–80)
BASE EXCESS BLDV CALC-SCNC: 2.3 MMOL/L
BASOPHILS # BLD AUTO: 0.01 THOUSANDS/ΜL (ref 0–0.1)
BASOPHILS # BLD AUTO: 0.03 THOUSANDS/ΜL (ref 0–0.1)
BASOPHILS NFR BLD AUTO: 0 % (ref 0–1)
BASOPHILS NFR BLD AUTO: 0 % (ref 0–1)
BILIRUB SERPL-MCNC: 0.24 MG/DL (ref 0.2–1)
BILIRUB SERPL-MCNC: 0.39 MG/DL (ref 0.2–1)
BILIRUB SERPL-MCNC: 0.43 MG/DL (ref 0.2–1)
BILIRUB UR QL STRIP: NEGATIVE
BILIRUB UR QL STRIP: NEGATIVE
BUN SERPL-MCNC: 15 MG/DL (ref 5–25)
BUN SERPL-MCNC: 17 MG/DL (ref 5–25)
BUN SERPL-MCNC: 17 MG/DL (ref 5–25)
BUN SERPL-MCNC: 19 MG/DL (ref 5–25)
BUN SERPL-MCNC: 22 MG/DL (ref 5–25)
BUN SERPL-MCNC: 30 MG/DL (ref 5–25)
BUN SERPL-MCNC: 34 MG/DL (ref 5–25)
CALCIUM ALBUM COR SERPL-MCNC: 10.5 MG/DL (ref 8.3–10.1)
CALCIUM ALBUM COR SERPL-MCNC: 10.7 MG/DL (ref 8.3–10.1)
CALCIUM ALBUM COR SERPL-MCNC: 11.2 MG/DL (ref 8.3–10.1)
CALCIUM SERPL-MCNC: 8.5 MG/DL (ref 8.3–10.1)
CALCIUM SERPL-MCNC: 8.6 MG/DL (ref 8.3–10.1)
CALCIUM SERPL-MCNC: 8.6 MG/DL (ref 8.3–10.1)
CALCIUM SERPL-MCNC: 8.8 MG/DL (ref 8.3–10.1)
CALCIUM SERPL-MCNC: 8.9 MG/DL (ref 8.3–10.1)
CALCIUM SERPL-MCNC: 8.9 MG/DL (ref 8.3–10.1)
CALCIUM SERPL-MCNC: 9.5 MG/DL (ref 8.3–10.1)
CHLORIDE SERPL-SCNC: 101 MMOL/L (ref 100–108)
CHLORIDE SERPL-SCNC: 102 MMOL/L (ref 100–108)
CHLORIDE SERPL-SCNC: 103 MMOL/L (ref 100–108)
CHLORIDE SERPL-SCNC: 104 MMOL/L (ref 100–108)
CHLORIDE SERPL-SCNC: 105 MMOL/L (ref 100–108)
CHLORIDE SERPL-SCNC: 105 MMOL/L (ref 100–108)
CHLORIDE SERPL-SCNC: 99 MMOL/L (ref 100–108)
CK SERPL-CCNC: 50 U/L (ref 26–192)
CLARITY UR: CLEAR
CLARITY UR: CLEAR
CO2 SERPL-SCNC: 26 MMOL/L (ref 21–32)
CO2 SERPL-SCNC: 27 MMOL/L (ref 21–32)
CO2 SERPL-SCNC: 30 MMOL/L (ref 21–32)
COLOR UR: YELLOW
COLOR UR: YELLOW
CREAT SERPL-MCNC: 0.73 MG/DL (ref 0.6–1.3)
CREAT SERPL-MCNC: 0.77 MG/DL (ref 0.6–1.3)
CREAT SERPL-MCNC: 0.83 MG/DL (ref 0.6–1.3)
CREAT SERPL-MCNC: 0.87 MG/DL (ref 0.6–1.3)
CREAT SERPL-MCNC: 1.15 MG/DL (ref 0.6–1.3)
CREAT SERPL-MCNC: 1.19 MG/DL (ref 0.6–1.3)
CREAT SERPL-MCNC: 1.54 MG/DL (ref 0.6–1.3)
CRP SERPL QL: 123.5 MG/L
D DIMER PPP FEU-MCNC: 2.77 UG/ML FEU
EOSINOPHIL # BLD AUTO: 0.01 THOUSAND/ΜL (ref 0–0.61)
EOSINOPHIL # BLD AUTO: 0.1 THOUSAND/ΜL (ref 0–0.61)
EOSINOPHIL NFR BLD AUTO: 0 % (ref 0–6)
EOSINOPHIL NFR BLD AUTO: 1 % (ref 0–6)
ERYTHROCYTE [DISTWIDTH] IN BLOOD BY AUTOMATED COUNT: 15.9 % (ref 11.6–15.1)
ERYTHROCYTE [DISTWIDTH] IN BLOOD BY AUTOMATED COUNT: 16 % (ref 11.6–15.1)
ERYTHROCYTE [DISTWIDTH] IN BLOOD BY AUTOMATED COUNT: 16.2 % (ref 11.6–15.1)
ERYTHROCYTE [DISTWIDTH] IN BLOOD BY AUTOMATED COUNT: 16.4 % (ref 11.6–15.1)
GFR SERPL CREATININE-BSD FRML MDRD: 29 ML/MIN/1.73SQ M
GFR SERPL CREATININE-BSD FRML MDRD: 40 ML/MIN/1.73SQ M
GFR SERPL CREATININE-BSD FRML MDRD: 41 ML/MIN/1.73SQ M
GFR SERPL CREATININE-BSD FRML MDRD: 58 ML/MIN/1.73SQ M
GFR SERPL CREATININE-BSD FRML MDRD: 61 ML/MIN/1.73SQ M
GFR SERPL CREATININE-BSD FRML MDRD: 67 ML/MIN/1.73SQ M
GFR SERPL CREATININE-BSD FRML MDRD: 72 ML/MIN/1.73SQ M
GLUCOSE SERPL-MCNC: 101 MG/DL (ref 65–140)
GLUCOSE SERPL-MCNC: 133 MG/DL (ref 65–140)
GLUCOSE SERPL-MCNC: 89 MG/DL (ref 65–140)
GLUCOSE SERPL-MCNC: 92 MG/DL (ref 65–140)
GLUCOSE SERPL-MCNC: 92 MG/DL (ref 65–140)
GLUCOSE SERPL-MCNC: 94 MG/DL (ref 65–140)
GLUCOSE SERPL-MCNC: 95 MG/DL (ref 65–140)
GLUCOSE UR STRIP-MCNC: NEGATIVE MG/DL
GLUCOSE UR STRIP-MCNC: NEGATIVE MG/DL
HCO3 BLDV-SCNC: 28.9 MMOL/L (ref 24–30)
HCT VFR BLD AUTO: 27.3 % (ref 34.8–46.1)
HCT VFR BLD AUTO: 29.7 % (ref 34.8–46.1)
HCT VFR BLD AUTO: 29.9 % (ref 34.8–46.1)
HCT VFR BLD AUTO: 32.1 % (ref 34.8–46.1)
HCT VFR BLD AUTO: 33.4 % (ref 34.8–46.1)
HCT VFR BLD AUTO: 35.3 % (ref 34.8–46.1)
HGB BLD-MCNC: 10.2 G/DL (ref 11.5–15.4)
HGB BLD-MCNC: 10.7 G/DL (ref 11.5–15.4)
HGB BLD-MCNC: 11.1 G/DL (ref 11.5–15.4)
HGB BLD-MCNC: 8.8 G/DL (ref 11.5–15.4)
HGB BLD-MCNC: 9.5 G/DL (ref 11.5–15.4)
HGB BLD-MCNC: 9.5 G/DL (ref 11.5–15.4)
HGB UR QL STRIP.AUTO: ABNORMAL
HGB UR QL STRIP.AUTO: NEGATIVE
HYALINE CASTS #/AREA URNS LPF: ABNORMAL /LPF
HYALINE CASTS #/AREA URNS LPF: ABNORMAL /LPF
IMM GRANULOCYTES # BLD AUTO: 0.03 THOUSAND/UL (ref 0–0.2)
IMM GRANULOCYTES # BLD AUTO: 0.03 THOUSAND/UL (ref 0–0.2)
IMM GRANULOCYTES NFR BLD AUTO: 0 % (ref 0–2)
IMM GRANULOCYTES NFR BLD AUTO: 0 % (ref 0–2)
INR PPP: 1.09 (ref 0.84–1.19)
INR PPP: 1.28 (ref 0.84–1.19)
KETONES UR STRIP-MCNC: NEGATIVE MG/DL
KETONES UR STRIP-MCNC: NEGATIVE MG/DL
LACTATE SERPL-SCNC: 1.5 MMOL/L (ref 0.5–2)
LEUKOCYTE ESTERASE UR QL STRIP: ABNORMAL
LEUKOCYTE ESTERASE UR QL STRIP: ABNORMAL
LIPASE SERPL-CCNC: 185 U/L (ref 73–393)
LYMPHOCYTES # BLD AUTO: 0.78 THOUSANDS/ΜL (ref 0.6–4.47)
LYMPHOCYTES # BLD AUTO: 1.07 THOUSANDS/ΜL (ref 0.6–4.47)
LYMPHOCYTES NFR BLD AUTO: 10 % (ref 14–44)
LYMPHOCYTES NFR BLD AUTO: 11 % (ref 14–44)
MAGNESIUM SERPL-MCNC: 1.7 MG/DL (ref 1.6–2.6)
MCH RBC QN AUTO: 26.2 PG (ref 26.8–34.3)
MCH RBC QN AUTO: 26.5 PG (ref 26.8–34.3)
MCH RBC QN AUTO: 26.7 PG (ref 26.8–34.3)
MCH RBC QN AUTO: 26.9 PG (ref 26.8–34.3)
MCH RBC QN AUTO: 27.1 PG (ref 26.8–34.3)
MCH RBC QN AUTO: 27.4 PG (ref 26.8–34.3)
MCHC RBC AUTO-ENTMCNC: 31.4 G/DL (ref 31.4–37.4)
MCHC RBC AUTO-ENTMCNC: 31.8 G/DL (ref 31.4–37.4)
MCHC RBC AUTO-ENTMCNC: 31.8 G/DL (ref 31.4–37.4)
MCHC RBC AUTO-ENTMCNC: 32 G/DL (ref 31.4–37.4)
MCHC RBC AUTO-ENTMCNC: 32 G/DL (ref 31.4–37.4)
MCHC RBC AUTO-ENTMCNC: 32.2 G/DL (ref 31.4–37.4)
MCV RBC AUTO: 83 FL (ref 82–98)
MCV RBC AUTO: 84 FL (ref 82–98)
MCV RBC AUTO: 86 FL (ref 82–98)
MONOCYTES # BLD AUTO: 0.34 THOUSAND/ΜL (ref 0.17–1.22)
MONOCYTES # BLD AUTO: 0.74 THOUSAND/ΜL (ref 0.17–1.22)
MONOCYTES NFR BLD AUTO: 4 % (ref 4–12)
MONOCYTES NFR BLD AUTO: 8 % (ref 4–12)
NEUTROPHILS # BLD AUTO: 6.91 THOUSANDS/ΜL (ref 1.85–7.62)
NEUTROPHILS # BLD AUTO: 7.41 THOUSANDS/ΜL (ref 1.85–7.62)
NEUTS SEG NFR BLD AUTO: 80 % (ref 43–75)
NEUTS SEG NFR BLD AUTO: 86 % (ref 43–75)
NITRITE UR QL STRIP: NEGATIVE
NITRITE UR QL STRIP: NEGATIVE
NON-SQ EPI CELLS URNS QL MICRO: ABNORMAL /HPF
NON-SQ EPI CELLS URNS QL MICRO: ABNORMAL /HPF
NRBC BLD AUTO-RTO: 0 /100 WBCS
NRBC BLD AUTO-RTO: 0 /100 WBCS
NT-PROBNP SERPL-MCNC: 6185 PG/ML
O2 CT BLDV-SCNC: 8 ML/DL
P AXIS: 55 DEGREES
P AXIS: 81 DEGREES
PCO2 BLDV: 53.9 MM HG (ref 42–50)
PH BLDV: 7.35 [PH] (ref 7.3–7.4)
PH UR STRIP.AUTO: 5.5 [PH]
PH UR STRIP.AUTO: 5.5 [PH] (ref 4.5–8)
PHOSPHATE SERPL-MCNC: 2.4 MG/DL (ref 2.3–4.1)
PLATELET # BLD AUTO: 301 THOUSANDS/UL (ref 149–390)
PLATELET # BLD AUTO: 303 THOUSANDS/UL (ref 149–390)
PLATELET # BLD AUTO: 303 THOUSANDS/UL (ref 149–390)
PLATELET # BLD AUTO: 316 THOUSANDS/UL (ref 149–390)
PLATELET # BLD AUTO: 357 THOUSANDS/UL (ref 149–390)
PLATELET # BLD AUTO: 360 THOUSANDS/UL (ref 149–390)
PMV BLD AUTO: 11.6 FL (ref 8.9–12.7)
PMV BLD AUTO: 11.7 FL (ref 8.9–12.7)
PMV BLD AUTO: 11.9 FL (ref 8.9–12.7)
PMV BLD AUTO: 12 FL (ref 8.9–12.7)
PMV BLD AUTO: 12 FL (ref 8.9–12.7)
PMV BLD AUTO: 12.8 FL (ref 8.9–12.7)
PO2 BLDV: 29.1 MM HG (ref 35–45)
POTASSIUM SERPL-SCNC: 3.2 MMOL/L (ref 3.5–5.3)
POTASSIUM SERPL-SCNC: 3.3 MMOL/L (ref 3.5–5.3)
POTASSIUM SERPL-SCNC: 3.4 MMOL/L (ref 3.5–5.3)
POTASSIUM SERPL-SCNC: 3.6 MMOL/L (ref 3.5–5.3)
POTASSIUM SERPL-SCNC: 3.7 MMOL/L (ref 3.5–5.3)
POTASSIUM SERPL-SCNC: 3.9 MMOL/L (ref 3.5–5.3)
POTASSIUM SERPL-SCNC: 4.1 MMOL/L (ref 3.5–5.3)
PR INTERVAL: 176 MS
PR INTERVAL: 186 MS
PR INTERVAL: 192 MS
PROCALCITONIN SERPL-MCNC: 0.17 NG/ML
PROT SERPL-MCNC: 5.8 G/DL (ref 6.4–8.2)
PROT SERPL-MCNC: 6.7 G/DL (ref 6.4–8.2)
PROT SERPL-MCNC: 7.2 G/DL (ref 6.4–8.2)
PROT UR STRIP-MCNC: NEGATIVE MG/DL
PROT UR STRIP-MCNC: NEGATIVE MG/DL
PROTHROMBIN TIME: 14.2 SECONDS (ref 11.6–14.5)
PROTHROMBIN TIME: 16 SECONDS (ref 11.6–14.5)
QRS AXIS: -10 DEGREES
QRS AXIS: -11 DEGREES
QRS AXIS: -13 DEGREES
QRS AXIS: -27 DEGREES
QRS AXIS: -5 DEGREES
QRSD INTERVAL: 100 MS
QRSD INTERVAL: 102 MS
QRSD INTERVAL: 94 MS
QRSD INTERVAL: 96 MS
QRSD INTERVAL: 98 MS
QT INTERVAL: 318 MS
QT INTERVAL: 390 MS
QT INTERVAL: 396 MS
QT INTERVAL: 406 MS
QT INTERVAL: 406 MS
QTC INTERVAL: 429 MS
QTC INTERVAL: 434 MS
QTC INTERVAL: 438 MS
QTC INTERVAL: 451 MS
QTC INTERVAL: 455 MS
RBC # BLD AUTO: 3.25 MILLION/UL (ref 3.81–5.12)
RBC # BLD AUTO: 3.47 MILLION/UL (ref 3.81–5.12)
RBC # BLD AUTO: 3.58 MILLION/UL (ref 3.81–5.12)
RBC # BLD AUTO: 3.82 MILLION/UL (ref 3.81–5.12)
RBC # BLD AUTO: 3.98 MILLION/UL (ref 3.81–5.12)
RBC # BLD AUTO: 4.24 MILLION/UL (ref 3.81–5.12)
RBC #/AREA URNS AUTO: ABNORMAL /HPF
RBC #/AREA URNS AUTO: ABNORMAL /HPF
SARS-COV-2 RNA RESP QL NAA+PROBE: POSITIVE
SODIUM SERPL-SCNC: 134 MMOL/L (ref 136–145)
SODIUM SERPL-SCNC: 134 MMOL/L (ref 136–145)
SODIUM SERPL-SCNC: 135 MMOL/L (ref 136–145)
SODIUM SERPL-SCNC: 135 MMOL/L (ref 136–145)
SODIUM SERPL-SCNC: 136 MMOL/L (ref 136–145)
SODIUM SERPL-SCNC: 136 MMOL/L (ref 136–145)
SODIUM SERPL-SCNC: 137 MMOL/L (ref 136–145)
SP GR UR STRIP.AUTO: 1.01 (ref 1–1.03)
SP GR UR STRIP.AUTO: 1.02 (ref 1–1.03)
T WAVE AXIS: 1 DEGREES
T WAVE AXIS: 29 DEGREES
T WAVE AXIS: 35 DEGREES
T WAVE AXIS: 38 DEGREES
T WAVE AXIS: 67 DEGREES
TROPONIN I SERPL-MCNC: 0.04 NG/ML
TROPONIN I SERPL-MCNC: 0.04 NG/ML
TROPONIN I SERPL-MCNC: <0.02 NG/ML
TSH SERPL DL<=0.05 MIU/L-ACNC: 0.91 UIU/ML (ref 0.36–3.74)
TSH SERPL DL<=0.05 MIU/L-ACNC: 1.02 UIU/ML (ref 0.36–3.74)
UROBILINOGEN UR QL STRIP.AUTO: 0.2 E.U./DL
UROBILINOGEN UR QL STRIP.AUTO: 0.2 E.U./DL
VENTRICULAR RATE: 112 BPM
VENTRICULAR RATE: 67 BPM
VENTRICULAR RATE: 70 BPM
VENTRICULAR RATE: 78 BPM
VENTRICULAR RATE: 82 BPM
WBC # BLD AUTO: 10.79 THOUSAND/UL (ref 4.31–10.16)
WBC # BLD AUTO: 11.96 THOUSAND/UL (ref 4.31–10.16)
WBC # BLD AUTO: 8.08 THOUSAND/UL (ref 4.31–10.16)
WBC # BLD AUTO: 8.88 THOUSAND/UL (ref 4.31–10.16)
WBC # BLD AUTO: 9.31 THOUSAND/UL (ref 4.31–10.16)
WBC # BLD AUTO: 9.38 THOUSAND/UL (ref 4.31–10.16)
WBC #/AREA URNS AUTO: ABNORMAL /HPF
WBC #/AREA URNS AUTO: ABNORMAL /HPF

## 2021-01-01 PROCEDURE — 99152 MOD SED SAME PHYS/QHP 5/>YRS: CPT | Performed by: INTERNAL MEDICINE

## 2021-01-01 PROCEDURE — 77012 CT SCAN FOR NEEDLE BIOPSY: CPT

## 2021-01-01 PROCEDURE — 85027 COMPLETE CBC AUTOMATED: CPT | Performed by: FAMILY MEDICINE

## 2021-01-01 PROCEDURE — 0001A SARS-COV-2 / COVID-19 MRNA VACCINE (PFIZER-BIONTECH) 30 MCG: CPT

## 2021-01-01 PROCEDURE — 94760 N-INVAS EAR/PLS OXIMETRY 1: CPT

## 2021-01-01 PROCEDURE — 99232 SBSQ HOSP IP/OBS MODERATE 35: CPT | Performed by: FAMILY MEDICINE

## 2021-01-01 PROCEDURE — 88342 IMHCHEM/IMCYTCHM 1ST ANTB: CPT | Performed by: PATHOLOGY

## 2021-01-01 PROCEDURE — 80053 COMPREHEN METABOLIC PANEL: CPT | Performed by: EMERGENCY MEDICINE

## 2021-01-01 PROCEDURE — 99232 SBSQ HOSP IP/OBS MODERATE 35: CPT | Performed by: INTERNAL MEDICINE

## 2021-01-01 PROCEDURE — 99285 EMERGENCY DEPT VISIT HI MDM: CPT

## 2021-01-01 PROCEDURE — 93005 ELECTROCARDIOGRAM TRACING: CPT

## 2021-01-01 PROCEDURE — 80053 COMPREHEN METABOLIC PANEL: CPT

## 2021-01-01 PROCEDURE — 71045 X-RAY EXAM CHEST 1 VIEW: CPT

## 2021-01-01 PROCEDURE — 85025 COMPLETE CBC W/AUTO DIFF WBC: CPT | Performed by: EMERGENCY MEDICINE

## 2021-01-01 PROCEDURE — 84484 ASSAY OF TROPONIN QUANT: CPT | Performed by: FAMILY MEDICINE

## 2021-01-01 PROCEDURE — U0005 INFEC AGEN DETEC AMPLI PROBE: HCPCS | Performed by: EMERGENCY MEDICINE

## 2021-01-01 PROCEDURE — 97167 OT EVAL HIGH COMPLEX 60 MIN: CPT

## 2021-01-01 PROCEDURE — 32408 CORE NDL BX LNG/MED PERQ: CPT | Performed by: INTERNAL MEDICINE

## 2021-01-01 PROCEDURE — 81001 URINALYSIS AUTO W/SCOPE: CPT | Performed by: HOSPITALIST

## 2021-01-01 PROCEDURE — 85379 FIBRIN DEGRADATION QUANT: CPT | Performed by: EMERGENCY MEDICINE

## 2021-01-01 PROCEDURE — 84484 ASSAY OF TROPONIN QUANT: CPT

## 2021-01-01 PROCEDURE — 93010 ELECTROCARDIOGRAM REPORT: CPT | Performed by: INTERNAL MEDICINE

## 2021-01-01 PROCEDURE — 91300 SARS-COV-2 / COVID-19 MRNA VACCINE (PFIZER-BIONTECH) 30 MCG: CPT

## 2021-01-01 PROCEDURE — U0003 INFECTIOUS AGENT DETECTION BY NUCLEIC ACID (DNA OR RNA); SEVERE ACUTE RESPIRATORY SYNDROME CORONAVIRUS 2 (SARS-COV-2) (CORONAVIRUS DISEASE [COVID-19]), AMPLIFIED PROBE TECHNIQUE, MAKING USE OF HIGH THROUGHPUT TECHNOLOGIES AS DESCRIBED BY CMS-2020-01-R: HCPCS | Performed by: EMERGENCY MEDICINE

## 2021-01-01 PROCEDURE — 82805 BLOOD GASES W/O2 SATURATION: CPT | Performed by: EMERGENCY MEDICINE

## 2021-01-01 PROCEDURE — 83605 ASSAY OF LACTIC ACID: CPT | Performed by: EMERGENCY MEDICINE

## 2021-01-01 PROCEDURE — 97162 PT EVAL MOD COMPLEX 30 MIN: CPT

## 2021-01-01 PROCEDURE — G1004 CDSM NDSC: HCPCS

## 2021-01-01 PROCEDURE — 84145 PROCALCITONIN (PCT): CPT | Performed by: PHYSICIAN ASSISTANT

## 2021-01-01 PROCEDURE — 99222 1ST HOSP IP/OBS MODERATE 55: CPT | Performed by: HOSPITALIST

## 2021-01-01 PROCEDURE — 80048 BASIC METABOLIC PNL TOTAL CA: CPT | Performed by: FAMILY MEDICINE

## 2021-01-01 PROCEDURE — 81001 URINALYSIS AUTO W/SCOPE: CPT

## 2021-01-01 PROCEDURE — 0BBJ3ZX EXCISION OF LEFT LOWER LUNG LOBE, PERCUTANEOUS APPROACH, DIAGNOSTIC: ICD-10-PCS | Performed by: INTERNAL MEDICINE

## 2021-01-01 PROCEDURE — 85027 COMPLETE CBC AUTOMATED: CPT | Performed by: HOSPITALIST

## 2021-01-01 PROCEDURE — 97129 THER IVNTJ 1ST 15 MIN: CPT

## 2021-01-01 PROCEDURE — 93971 EXTREMITY STUDY: CPT

## 2021-01-01 PROCEDURE — 99232 SBSQ HOSP IP/OBS MODERATE 35: CPT | Performed by: SURGERY

## 2021-01-01 PROCEDURE — 99233 SBSQ HOSP IP/OBS HIGH 50: CPT | Performed by: INTERNAL MEDICINE

## 2021-01-01 PROCEDURE — 36415 COLL VENOUS BLD VENIPUNCTURE: CPT

## 2021-01-01 PROCEDURE — 96365 THER/PROPH/DIAG IV INF INIT: CPT

## 2021-01-01 PROCEDURE — 80053 COMPREHEN METABOLIC PANEL: CPT | Performed by: HOSPITALIST

## 2021-01-01 PROCEDURE — 99232 SBSQ HOSP IP/OBS MODERATE 35: CPT | Performed by: HOSPITALIST

## 2021-01-01 PROCEDURE — 94762 N-INVAS EAR/PLS OXIMTRY CONT: CPT

## 2021-01-01 PROCEDURE — 0002A SARS-COV-2 / COVID-19 MRNA VACCINE (PFIZER-BIONTECH) 30 MCG: CPT

## 2021-01-01 PROCEDURE — 99285 EMERGENCY DEPT VISIT HI MDM: CPT | Performed by: EMERGENCY MEDICINE

## 2021-01-01 PROCEDURE — 84443 ASSAY THYROID STIM HORMONE: CPT

## 2021-01-01 PROCEDURE — 99153 MOD SED SAME PHYS/QHP EA: CPT

## 2021-01-01 PROCEDURE — 83735 ASSAY OF MAGNESIUM: CPT | Performed by: HOSPITALIST

## 2021-01-01 PROCEDURE — 83690 ASSAY OF LIPASE: CPT

## 2021-01-01 PROCEDURE — 87040 BLOOD CULTURE FOR BACTERIA: CPT | Performed by: EMERGENCY MEDICINE

## 2021-01-01 PROCEDURE — 99233 SBSQ HOSP IP/OBS HIGH 50: CPT | Performed by: NURSE PRACTITIONER

## 2021-01-01 PROCEDURE — 32408 CORE NDL BX LNG/MED PERQ: CPT

## 2021-01-01 PROCEDURE — 99223 1ST HOSP IP/OBS HIGH 75: CPT | Performed by: INTERNAL MEDICINE

## 2021-01-01 PROCEDURE — 85610 PROTHROMBIN TIME: CPT | Performed by: PHYSICIAN ASSISTANT

## 2021-01-01 PROCEDURE — 85730 THROMBOPLASTIN TIME PARTIAL: CPT | Performed by: EMERGENCY MEDICINE

## 2021-01-01 PROCEDURE — 87086 URINE CULTURE/COLONY COUNT: CPT

## 2021-01-01 PROCEDURE — 97130 THER IVNTJ EA ADDL 15 MIN: CPT

## 2021-01-01 PROCEDURE — 97110 THERAPEUTIC EXERCISES: CPT

## 2021-01-01 PROCEDURE — 97535 SELF CARE MNGMENT TRAINING: CPT

## 2021-01-01 PROCEDURE — 99152 MOD SED SAME PHYS/QHP 5/>YRS: CPT

## 2021-01-01 PROCEDURE — 97116 GAIT TRAINING THERAPY: CPT

## 2021-01-01 PROCEDURE — 36415 COLL VENOUS BLD VENIPUNCTURE: CPT | Performed by: EMERGENCY MEDICINE

## 2021-01-01 PROCEDURE — 84484 ASSAY OF TROPONIN QUANT: CPT | Performed by: EMERGENCY MEDICINE

## 2021-01-01 PROCEDURE — 74176 CT ABD & PELVIS W/O CONTRAST: CPT

## 2021-01-01 PROCEDURE — 83880 ASSAY OF NATRIURETIC PEPTIDE: CPT | Performed by: EMERGENCY MEDICINE

## 2021-01-01 PROCEDURE — 86140 C-REACTIVE PROTEIN: CPT | Performed by: NURSE PRACTITIONER

## 2021-01-01 PROCEDURE — 85610 PROTHROMBIN TIME: CPT | Performed by: EMERGENCY MEDICINE

## 2021-01-01 PROCEDURE — 84443 ASSAY THYROID STIM HORMONE: CPT | Performed by: HOSPITALIST

## 2021-01-01 PROCEDURE — 88305 TISSUE EXAM BY PATHOLOGIST: CPT | Performed by: PATHOLOGY

## 2021-01-01 PROCEDURE — 97164 PT RE-EVAL EST PLAN CARE: CPT

## 2021-01-01 PROCEDURE — 82550 ASSAY OF CK (CPK): CPT | Performed by: NURSE PRACTITIONER

## 2021-01-01 PROCEDURE — 93971 EXTREMITY STUDY: CPT | Performed by: SURGERY

## 2021-01-01 PROCEDURE — NC001 PR NO CHARGE: Performed by: SURGERY

## 2021-01-01 PROCEDURE — 84100 ASSAY OF PHOSPHORUS: CPT | Performed by: HOSPITALIST

## 2021-01-01 PROCEDURE — 97168 OT RE-EVAL EST PLAN CARE: CPT

## 2021-01-01 PROCEDURE — 99233 SBSQ HOSP IP/OBS HIGH 50: CPT | Performed by: FAMILY MEDICINE

## 2021-01-01 PROCEDURE — 99223 1ST HOSP IP/OBS HIGH 75: CPT | Performed by: SURGERY

## 2021-01-01 PROCEDURE — 99239 HOSP IP/OBS DSCHRG MGMT >30: CPT | Performed by: INTERNAL MEDICINE

## 2021-01-01 PROCEDURE — XW033E5 INTRODUCTION OF REMDESIVIR ANTI-INFECTIVE INTO PERIPHERAL VEIN, PERCUTANEOUS APPROACH, NEW TECHNOLOGY GROUP 5: ICD-10-PCS | Performed by: INTERNAL MEDICINE

## 2021-01-01 PROCEDURE — 99222 1ST HOSP IP/OBS MODERATE 55: CPT | Performed by: INTERNAL MEDICINE

## 2021-01-01 PROCEDURE — 94002 VENT MGMT INPAT INIT DAY: CPT

## 2021-01-01 PROCEDURE — 85025 COMPLETE CBC W/AUTO DIFF WBC: CPT

## 2021-01-01 RX ORDER — LORAZEPAM 2 MG/ML
1 INJECTION INTRAMUSCULAR EVERY 2 HOUR PRN
Status: DISCONTINUED | OUTPATIENT
Start: 2021-01-01 | End: 2021-09-03 | Stop reason: HOSPADM

## 2021-01-01 RX ORDER — ATORVASTATIN CALCIUM 40 MG/1
40 TABLET, FILM COATED ORAL DAILY
Status: DISCONTINUED | OUTPATIENT
Start: 2021-01-01 | End: 2021-09-03 | Stop reason: HOSPADM

## 2021-01-01 RX ORDER — ALBUTEROL SULFATE 90 UG/1
2 AEROSOL, METERED RESPIRATORY (INHALATION) EVERY 4 HOURS PRN
Status: DISCONTINUED | OUTPATIENT
Start: 2021-01-01 | End: 2021-09-03 | Stop reason: HOSPADM

## 2021-01-01 RX ORDER — ACETAMINOPHEN 325 MG/1
975 TABLET ORAL EVERY 8 HOURS SCHEDULED
Status: DISCONTINUED | OUTPATIENT
Start: 2021-01-01 | End: 2021-01-01 | Stop reason: HOSPADM

## 2021-01-01 RX ORDER — ASPIRIN 81 MG/1
81 TABLET, CHEWABLE ORAL DAILY
Status: DISCONTINUED | OUTPATIENT
Start: 2021-01-01 | End: 2021-01-01

## 2021-01-01 RX ORDER — ONDANSETRON 4 MG/1
4 TABLET, ORALLY DISINTEGRATING ORAL EVERY 6 HOURS PRN
Status: DISCONTINUED | OUTPATIENT
Start: 2021-01-01 | End: 2021-01-01 | Stop reason: HOSPADM

## 2021-01-01 RX ORDER — ONDANSETRON 2 MG/ML
4 INJECTION INTRAMUSCULAR; INTRAVENOUS EVERY 4 HOURS PRN
Status: DISCONTINUED | OUTPATIENT
Start: 2021-01-01 | End: 2021-01-01

## 2021-01-01 RX ORDER — DEXAMETHASONE SODIUM PHOSPHATE 4 MG/ML
6 INJECTION, SOLUTION INTRA-ARTICULAR; INTRALESIONAL; INTRAMUSCULAR; INTRAVENOUS; SOFT TISSUE EVERY 24 HOURS
Status: DISCONTINUED | OUTPATIENT
Start: 2021-01-01 | End: 2021-09-03 | Stop reason: HOSPADM

## 2021-01-01 RX ORDER — SENNOSIDES 8.6 MG
1 TABLET ORAL
Status: DISCONTINUED | OUTPATIENT
Start: 2021-01-01 | End: 2021-01-01 | Stop reason: HOSPADM

## 2021-01-01 RX ORDER — PANTOPRAZOLE SODIUM 40 MG/1
40 TABLET, DELAYED RELEASE ORAL
Qty: 30 TABLET | Refills: 0 | Status: SHIPPED | OUTPATIENT
Start: 2021-01-01 | End: 2021-09-03 | Stop reason: HOSPADM

## 2021-01-01 RX ORDER — POTASSIUM CHLORIDE 20 MEQ/1
40 TABLET, EXTENDED RELEASE ORAL ONCE
Status: COMPLETED | OUTPATIENT
Start: 2021-01-01 | End: 2021-01-01

## 2021-01-01 RX ORDER — SODIUM CHLORIDE 9 MG/ML
75 INJECTION, SOLUTION INTRAVENOUS CONTINUOUS
Status: DISCONTINUED | OUTPATIENT
Start: 2021-01-01 | End: 2021-01-01

## 2021-01-01 RX ORDER — PANTOPRAZOLE SODIUM 40 MG/1
40 TABLET, DELAYED RELEASE ORAL
Status: DISCONTINUED | OUTPATIENT
Start: 2021-01-01 | End: 2021-09-03 | Stop reason: HOSPADM

## 2021-01-01 RX ORDER — ONDANSETRON 2 MG/ML
4 INJECTION INTRAMUSCULAR; INTRAVENOUS EVERY 4 HOURS PRN
Status: DISCONTINUED | OUTPATIENT
Start: 2021-01-01 | End: 2021-09-03 | Stop reason: HOSPADM

## 2021-01-01 RX ORDER — OXYCODONE HYDROCHLORIDE 5 MG/1
2.5 TABLET ORAL EVERY 4 HOURS PRN
Status: DISCONTINUED | OUTPATIENT
Start: 2021-01-01 | End: 2021-01-01

## 2021-01-01 RX ORDER — BISACODYL 10 MG
10 SUPPOSITORY, RECTAL RECTAL DAILY PRN
Status: DISCONTINUED | OUTPATIENT
Start: 2021-01-01 | End: 2021-01-01 | Stop reason: HOSPADM

## 2021-01-01 RX ORDER — DOCUSATE SODIUM 100 MG/1
100 CAPSULE, LIQUID FILLED ORAL DAILY
Status: DISCONTINUED | OUTPATIENT
Start: 2021-01-01 | End: 2021-09-03 | Stop reason: HOSPADM

## 2021-01-01 RX ORDER — POLYVINYL ALCOHOL 14 MG/ML
1 SOLUTION/ DROPS OPHTHALMIC
Status: DISCONTINUED | OUTPATIENT
Start: 2021-01-01 | End: 2021-01-01 | Stop reason: CLARIF

## 2021-01-01 RX ORDER — POLYETHYLENE GLYCOL 3350 17 G/17G
17 POWDER, FOR SOLUTION ORAL DAILY PRN
Status: DISCONTINUED | OUTPATIENT
Start: 2021-01-01 | End: 2021-01-01 | Stop reason: HOSPADM

## 2021-01-01 RX ORDER — LIDOCAINE 50 MG/G
1 PATCH TOPICAL DAILY
Qty: 30 PATCH | Refills: 0 | Status: SHIPPED | OUTPATIENT
Start: 2021-01-01 | End: 2021-09-03 | Stop reason: HOSPADM

## 2021-01-01 RX ORDER — LIDOCAINE WITH 8.4% SOD BICARB 0.9%(10ML)
SYRINGE (ML) INJECTION CODE/TRAUMA/SEDATION MEDICATION
Status: COMPLETED | OUTPATIENT
Start: 2021-01-01 | End: 2021-01-01

## 2021-01-01 RX ORDER — PANTOPRAZOLE SODIUM 40 MG/1
40 TABLET, DELAYED RELEASE ORAL
Status: DISCONTINUED | OUTPATIENT
Start: 2021-01-01 | End: 2021-01-01 | Stop reason: HOSPADM

## 2021-01-01 RX ORDER — METOCLOPRAMIDE HYDROCHLORIDE 5 MG/ML
10 INJECTION INTRAMUSCULAR; INTRAVENOUS EVERY 6 HOURS PRN
Status: DISCONTINUED | OUTPATIENT
Start: 2021-01-01 | End: 2021-09-03 | Stop reason: HOSPADM

## 2021-01-01 RX ORDER — ACETAMINOPHEN 325 MG/1
650 TABLET ORAL EVERY 6 HOURS PRN
Status: DISCONTINUED | OUTPATIENT
Start: 2021-01-01 | End: 2021-09-03 | Stop reason: HOSPADM

## 2021-01-01 RX ORDER — LEVOTHYROXINE SODIUM 0.1 MG/1
100 TABLET ORAL DAILY
Status: DISCONTINUED | OUTPATIENT
Start: 2021-01-01 | End: 2021-01-01 | Stop reason: HOSPADM

## 2021-01-01 RX ORDER — OXYCODONE HYDROCHLORIDE 5 MG/1
2.5 TABLET ORAL EVERY 4 HOURS PRN
Qty: 30 TABLET | Refills: 0 | Status: SHIPPED | OUTPATIENT
Start: 2021-01-01 | End: 2021-09-03 | Stop reason: HOSPADM

## 2021-01-01 RX ORDER — DOCUSATE SODIUM 100 MG/1
100 CAPSULE, LIQUID FILLED ORAL DAILY
Qty: 30 CAPSULE | Refills: 0 | Status: SHIPPED | OUTPATIENT
Start: 2021-01-01 | End: 2021-09-03 | Stop reason: HOSPADM

## 2021-01-01 RX ORDER — ACETAMINOPHEN 325 MG/1
975 TABLET ORAL EVERY 8 HOURS SCHEDULED
Qty: 30 TABLET | Refills: 0 | Status: SHIPPED | OUTPATIENT
Start: 2021-01-01 | End: 2021-09-03 | Stop reason: HOSPADM

## 2021-01-01 RX ORDER — LIDOCAINE 50 MG/G
1 PATCH TOPICAL DAILY
Status: DISCONTINUED | OUTPATIENT
Start: 2021-01-01 | End: 2021-01-01 | Stop reason: HOSPADM

## 2021-01-01 RX ORDER — DOCUSATE SODIUM 100 MG/1
100 CAPSULE, LIQUID FILLED ORAL 2 TIMES DAILY
Status: DISCONTINUED | OUTPATIENT
Start: 2021-01-01 | End: 2021-01-01

## 2021-01-01 RX ORDER — OXYCODONE HYDROCHLORIDE 5 MG/1
2.5 TABLET ORAL EVERY 4 HOURS PRN
Status: DISCONTINUED | OUTPATIENT
Start: 2021-01-01 | End: 2021-01-01 | Stop reason: HOSPADM

## 2021-01-01 RX ORDER — FENTANYL CITRATE 50 UG/ML
INJECTION, SOLUTION INTRAMUSCULAR; INTRAVENOUS CODE/TRAUMA/SEDATION MEDICATION
Status: COMPLETED | OUTPATIENT
Start: 2021-01-01 | End: 2021-01-01

## 2021-01-01 RX ORDER — PANTOPRAZOLE SODIUM 40 MG/1
40 TABLET, DELAYED RELEASE ORAL
Status: DISCONTINUED | OUTPATIENT
Start: 2021-01-01 | End: 2021-01-01

## 2021-01-01 RX ORDER — BENZONATATE 100 MG/1
200 CAPSULE ORAL 3 TIMES DAILY
Status: DISCONTINUED | OUTPATIENT
Start: 2021-01-01 | End: 2021-09-03 | Stop reason: HOSPADM

## 2021-01-01 RX ORDER — ONDANSETRON 2 MG/ML
4 INJECTION INTRAMUSCULAR; INTRAVENOUS ONCE
Status: COMPLETED | OUTPATIENT
Start: 2021-01-01 | End: 2021-01-01

## 2021-01-01 RX ORDER — AMLODIPINE BESYLATE 5 MG/1
5 TABLET ORAL DAILY
Status: DISCONTINUED | OUTPATIENT
Start: 2021-01-01 | End: 2021-01-01 | Stop reason: HOSPADM

## 2021-01-01 RX ORDER — HYDROMORPHONE HCL/PF 1 MG/ML
1 SYRINGE (ML) INJECTION
Status: DISCONTINUED | OUTPATIENT
Start: 2021-01-01 | End: 2021-09-03 | Stop reason: HOSPADM

## 2021-01-01 RX ORDER — OXYCODONE HYDROCHLORIDE 5 MG/1
2.5 TABLET ORAL EVERY 4 HOURS PRN
Status: DISCONTINUED | OUTPATIENT
Start: 2021-01-01 | End: 2021-09-03 | Stop reason: HOSPADM

## 2021-01-01 RX ORDER — ACETAMINOPHEN 325 MG/1
650 TABLET ORAL EVERY 6 HOURS PRN
Status: DISCONTINUED | OUTPATIENT
Start: 2021-01-01 | End: 2021-01-01

## 2021-01-01 RX ORDER — BISACODYL 10 MG
10 SUPPOSITORY, RECTAL RECTAL DAILY PRN
Qty: 12 SUPPOSITORY | Refills: 0 | Status: SHIPPED | OUTPATIENT
Start: 2021-01-01 | End: 2021-09-03 | Stop reason: HOSPADM

## 2021-01-01 RX ORDER — ATORVASTATIN CALCIUM 40 MG/1
40 TABLET, FILM COATED ORAL DAILY
Status: DISCONTINUED | OUTPATIENT
Start: 2021-01-01 | End: 2021-01-01 | Stop reason: HOSPADM

## 2021-01-01 RX ORDER — MIDAZOLAM HYDROCHLORIDE 2 MG/2ML
INJECTION, SOLUTION INTRAMUSCULAR; INTRAVENOUS CODE/TRAUMA/SEDATION MEDICATION
Status: COMPLETED | OUTPATIENT
Start: 2021-01-01 | End: 2021-01-01

## 2021-01-01 RX ORDER — LEVOTHYROXINE SODIUM 0.1 MG/1
100 TABLET ORAL
Status: DISCONTINUED | OUTPATIENT
Start: 2021-01-01 | End: 2021-09-03 | Stop reason: HOSPADM

## 2021-01-01 RX ORDER — LANOLIN ALCOHOL/MO/W.PET/CERES
3 CREAM (GRAM) TOPICAL
Status: DISCONTINUED | OUTPATIENT
Start: 2021-01-01 | End: 2021-01-01 | Stop reason: HOSPADM

## 2021-01-01 RX ORDER — BENZONATATE 100 MG/1
100 CAPSULE ORAL 3 TIMES DAILY PRN
Status: DISCONTINUED | OUTPATIENT
Start: 2021-01-01 | End: 2021-01-01

## 2021-01-01 RX ORDER — LANOLIN ALCOHOL/MO/W.PET/CERES
3 CREAM (GRAM) TOPICAL
Qty: 30 TABLET | Refills: 0 | Status: SHIPPED | OUTPATIENT
Start: 2021-01-01 | End: 2021-09-03 | Stop reason: HOSPADM

## 2021-01-01 RX ORDER — DOCUSATE SODIUM 100 MG/1
100 CAPSULE, LIQUID FILLED ORAL DAILY
Status: DISCONTINUED | OUTPATIENT
Start: 2021-01-01 | End: 2021-01-01 | Stop reason: HOSPADM

## 2021-01-01 RX ADMIN — LIDOCAINE 1 PATCH: 50 PATCH TOPICAL at 12:52

## 2021-01-01 RX ADMIN — GLYCERIN, HYPROMELLOSE, POLYETHYLENE GLYCOL 1 DROP: .2; .2; 1 LIQUID OPHTHALMIC at 09:10

## 2021-01-01 RX ADMIN — DESMOPRESSIN ACETATE 40 MG: 0.2 TABLET ORAL at 09:09

## 2021-01-01 RX ADMIN — ACETAMINOPHEN 975 MG: 325 TABLET, FILM COATED ORAL at 05:29

## 2021-01-01 RX ADMIN — DOCUSATE SODIUM 100 MG: 100 CAPSULE ORAL at 11:41

## 2021-01-01 RX ADMIN — LEVOTHYROXINE SODIUM 100 MCG: 100 TABLET ORAL at 09:20

## 2021-01-01 RX ADMIN — ACETAMINOPHEN 975 MG: 325 TABLET, FILM COATED ORAL at 14:25

## 2021-01-01 RX ADMIN — MULTIPLE VITAMINS W/ MINERALS TAB 1 TABLET: TAB ORAL at 08:59

## 2021-01-01 RX ADMIN — RIVAROXABAN 15 MG: 15 TABLET, FILM COATED ORAL at 10:12

## 2021-01-01 RX ADMIN — DOCUSATE SODIUM 100 MG: 100 CAPSULE ORAL at 18:31

## 2021-01-01 RX ADMIN — AMLODIPINE BESYLATE 5 MG: 5 TABLET ORAL at 09:09

## 2021-01-01 RX ADMIN — GLYCERIN, HYPROMELLOSE, POLYETHYLENE GLYCOL 1 DROP: .2; .2; 1 LIQUID OPHTHALMIC at 18:14

## 2021-01-01 RX ADMIN — RIVAROXABAN 15 MG: 15 TABLET, FILM COATED ORAL at 12:54

## 2021-01-01 RX ADMIN — MIDAZOLAM 0.5 MG: 1 INJECTION INTRAMUSCULAR; INTRAVENOUS at 14:52

## 2021-01-01 RX ADMIN — ACETAMINOPHEN 650 MG: 325 TABLET, FILM COATED ORAL at 02:07

## 2021-01-01 RX ADMIN — GLYCERIN, HYPROMELLOSE, POLYETHYLENE GLYCOL 1 DROP: .2; .2; 1 LIQUID OPHTHALMIC at 17:41

## 2021-01-01 RX ADMIN — ACETAMINOPHEN 975 MG: 325 TABLET, FILM COATED ORAL at 21:58

## 2021-01-01 RX ADMIN — OXYCODONE HYDROCHLORIDE 2.5 MG: 5 TABLET ORAL at 23:06

## 2021-01-01 RX ADMIN — ALBUTEROL SULFATE 2 PUFF: 90 AEROSOL, METERED RESPIRATORY (INHALATION) at 20:42

## 2021-01-01 RX ADMIN — SENNOSIDES 8.6 MG: 8.6 TABLET ORAL at 21:44

## 2021-01-01 RX ADMIN — RIVAROXABAN 15 MG: 15 TABLET, FILM COATED ORAL at 21:14

## 2021-01-01 RX ADMIN — PANTOPRAZOLE SODIUM 40 MG: 40 TABLET, DELAYED RELEASE ORAL at 05:29

## 2021-01-01 RX ADMIN — MULTIPLE VITAMINS W/ MINERALS TAB 1 TABLET: TAB ORAL at 09:20

## 2021-01-01 RX ADMIN — DOCUSATE SODIUM 100 MG: 100 CAPSULE ORAL at 10:12

## 2021-01-01 RX ADMIN — RIVAROXABAN 15 MG: 15 TABLET, FILM COATED ORAL at 22:56

## 2021-01-01 RX ADMIN — RIVAROXABAN 15 MG: 15 TABLET, FILM COATED ORAL at 21:44

## 2021-01-01 RX ADMIN — LIDOCAINE 1 PATCH: 50 PATCH TOPICAL at 09:00

## 2021-01-01 RX ADMIN — ONDANSETRON 4 MG: 2 INJECTION INTRAMUSCULAR; INTRAVENOUS at 22:56

## 2021-01-01 RX ADMIN — DOCUSATE SODIUM 100 MG: 100 CAPSULE ORAL at 09:20

## 2021-01-01 RX ADMIN — ACETAMINOPHEN 975 MG: 325 TABLET, FILM COATED ORAL at 16:02

## 2021-01-01 RX ADMIN — OXYCODONE HYDROCHLORIDE 2.5 MG: 5 TABLET ORAL at 21:49

## 2021-01-01 RX ADMIN — SENNOSIDES 8.6 MG: 8.6 TABLET ORAL at 21:41

## 2021-01-01 RX ADMIN — ATORVASTATIN CALCIUM 40 MG: 40 TABLET, FILM COATED ORAL at 11:34

## 2021-01-01 RX ADMIN — LIDOCAINE 1 PATCH: 50 PATCH TOPICAL at 09:12

## 2021-01-01 RX ADMIN — GLYCERIN, HYPROMELLOSE, POLYETHYLENE GLYCOL 1 DROP: .2; .2; 1 LIQUID OPHTHALMIC at 17:44

## 2021-01-01 RX ADMIN — AMLODIPINE BESYLATE 5 MG: 5 TABLET ORAL at 12:54

## 2021-01-01 RX ADMIN — ATORVASTATIN CALCIUM 40 MG: 40 TABLET, FILM COATED ORAL at 10:12

## 2021-01-01 RX ADMIN — ENOXAPARIN SODIUM 70 MG: 80 INJECTION SUBCUTANEOUS at 09:08

## 2021-01-01 RX ADMIN — DOCUSATE SODIUM 100 MG: 100 CAPSULE ORAL at 10:09

## 2021-01-01 RX ADMIN — SENNOSIDES 8.6 MG: 8.6 TABLET ORAL at 21:58

## 2021-01-01 RX ADMIN — Medication 1000 MG: at 05:10

## 2021-01-01 RX ADMIN — LIDOCAINE 1 PATCH: 50 PATCH TOPICAL at 09:09

## 2021-01-01 RX ADMIN — PANTOPRAZOLE SODIUM 40 MG: 40 TABLET, DELAYED RELEASE ORAL at 05:05

## 2021-01-01 RX ADMIN — ACETAMINOPHEN 975 MG: 325 TABLET, FILM COATED ORAL at 23:03

## 2021-01-01 RX ADMIN — SODIUM CHLORIDE 75 ML/HR: 0.9 INJECTION, SOLUTION INTRAVENOUS at 21:14

## 2021-01-01 RX ADMIN — DEXAMETHASONE SODIUM PHOSPHATE 6 MG: 4 INJECTION, SOLUTION INTRAMUSCULAR; INTRAVENOUS at 08:35

## 2021-01-01 RX ADMIN — OXYCODONE HYDROCHLORIDE 2.5 MG: 5 TABLET ORAL at 14:24

## 2021-01-01 RX ADMIN — MULTIPLE VITAMINS W/ MINERALS TAB 1 TABLET: TAB ORAL at 08:25

## 2021-01-01 RX ADMIN — Medication 10 ML: at 15:05

## 2021-01-01 RX ADMIN — LEVOTHYROXINE SODIUM 100 MCG: 100 TABLET ORAL at 11:35

## 2021-01-01 RX ADMIN — SENNOSIDES 8.6 MG: 8.6 TABLET ORAL at 21:33

## 2021-01-01 RX ADMIN — LEVOTHYROXINE SODIUM 100 MCG: 100 TABLET ORAL at 08:26

## 2021-01-01 RX ADMIN — ATORVASTATIN CALCIUM 40 MG: 40 TABLET, FILM COATED ORAL at 12:54

## 2021-01-01 RX ADMIN — PANTOPRAZOLE SODIUM 40 MG: 40 TABLET, DELAYED RELEASE ORAL at 08:34

## 2021-01-01 RX ADMIN — ALBUTEROL SULFATE 2 PUFF: 90 AEROSOL, METERED RESPIRATORY (INHALATION) at 18:06

## 2021-01-01 RX ADMIN — ACETAMINOPHEN 975 MG: 325 TABLET, FILM COATED ORAL at 21:36

## 2021-01-01 RX ADMIN — PANTOPRAZOLE SODIUM 40 MG: 40 TABLET, DELAYED RELEASE ORAL at 05:14

## 2021-01-01 RX ADMIN — GLYCERIN, HYPROMELLOSE, POLYETHYLENE GLYCOL 1 DROP: .2; .2; 1 LIQUID OPHTHALMIC at 08:59

## 2021-01-01 RX ADMIN — ATORVASTATIN CALCIUM 40 MG: 40 TABLET, FILM COATED ORAL at 09:09

## 2021-01-01 RX ADMIN — GLYCERIN, HYPROMELLOSE, POLYETHYLENE GLYCOL 1 DROP: .2; .2; 1 LIQUID OPHTHALMIC at 12:55

## 2021-01-01 RX ADMIN — LEVOTHYROXINE SODIUM 100 MCG: 100 TABLET ORAL at 10:12

## 2021-01-01 RX ADMIN — SENNOSIDES 8.6 MG: 8.6 TABLET ORAL at 23:03

## 2021-01-01 RX ADMIN — ACETAMINOPHEN 650 MG: 325 TABLET, FILM COATED ORAL at 16:44

## 2021-01-01 RX ADMIN — OXYCODONE HYDROCHLORIDE 2.5 MG: 5 TABLET ORAL at 14:10

## 2021-01-01 RX ADMIN — Medication 3 MG: at 22:13

## 2021-01-01 RX ADMIN — DOCUSATE SODIUM 100 MG: 100 CAPSULE ORAL at 17:41

## 2021-01-01 RX ADMIN — DOCUSATE SODIUM 100 MG: 100 CAPSULE ORAL at 18:02

## 2021-01-01 RX ADMIN — SENNOSIDES 8.6 MG: 8.6 TABLET ORAL at 21:46

## 2021-01-01 RX ADMIN — DOCUSATE SODIUM 100 MG: 100 CAPSULE ORAL at 08:34

## 2021-01-01 RX ADMIN — DOCUSATE SODIUM 100 MG: 100 CAPSULE ORAL at 16:03

## 2021-01-01 RX ADMIN — AMLODIPINE BESYLATE 5 MG: 5 TABLET ORAL at 11:32

## 2021-01-01 RX ADMIN — ACETAMINOPHEN 650 MG: 325 TABLET, FILM COATED ORAL at 06:27

## 2021-01-01 RX ADMIN — DOCUSATE SODIUM 100 MG: 100 CAPSULE ORAL at 17:34

## 2021-01-01 RX ADMIN — ATORVASTATIN CALCIUM 40 MG: 40 TABLET, FILM COATED ORAL at 09:28

## 2021-01-01 RX ADMIN — ACETAMINOPHEN 975 MG: 325 TABLET, FILM COATED ORAL at 05:09

## 2021-01-01 RX ADMIN — ACETAMINOPHEN 975 MG: 325 TABLET, FILM COATED ORAL at 05:57

## 2021-01-01 RX ADMIN — FENTANYL CITRATE 50 MCG: 50 INJECTION INTRAMUSCULAR; INTRAVENOUS at 14:52

## 2021-01-01 RX ADMIN — LEVOTHYROXINE SODIUM 100 MCG: 100 TABLET ORAL at 08:59

## 2021-01-01 RX ADMIN — LEVOTHYROXINE SODIUM 100 MCG: 100 TABLET ORAL at 10:08

## 2021-01-01 RX ADMIN — ONDANSETRON 4 MG: 2 INJECTION INTRAMUSCULAR; INTRAVENOUS at 22:39

## 2021-01-01 RX ADMIN — Medication 3 MG: at 21:36

## 2021-01-01 RX ADMIN — DOCUSATE SODIUM 100 MG: 100 CAPSULE ORAL at 17:43

## 2021-01-01 RX ADMIN — SODIUM CHLORIDE 1000 ML: 0.9 INJECTION, SOLUTION INTRAVENOUS at 19:38

## 2021-01-01 RX ADMIN — LEVOTHYROXINE SODIUM 100 MCG: 100 TABLET ORAL at 09:09

## 2021-01-01 RX ADMIN — ATORVASTATIN CALCIUM 40 MG: 40 TABLET, FILM COATED ORAL at 10:08

## 2021-01-01 RX ADMIN — GLYCERIN, HYPROMELLOSE, POLYETHYLENE GLYCOL 1 DROP: .2; .2; 1 LIQUID OPHTHALMIC at 10:15

## 2021-01-01 RX ADMIN — Medication 3 MG: at 22:56

## 2021-01-01 RX ADMIN — LIDOCAINE 1 PATCH: 50 PATCH TOPICAL at 09:25

## 2021-01-01 RX ADMIN — Medication 3 MG: at 21:44

## 2021-01-01 RX ADMIN — HYDROMORPHONE HYDROCHLORIDE 1 MG: 1 INJECTION, SOLUTION INTRAMUSCULAR; INTRAVENOUS; SUBCUTANEOUS at 22:34

## 2021-01-01 RX ADMIN — AMLODIPINE BESYLATE 7.5 MG: 5 TABLET ORAL at 09:04

## 2021-01-01 RX ADMIN — LEVOTHYROXINE SODIUM 100 MCG: 100 TABLET ORAL at 07:11

## 2021-01-01 RX ADMIN — MULTIPLE VITAMINS W/ MINERALS TAB 1 TABLET: TAB ORAL at 12:28

## 2021-01-01 RX ADMIN — DOCUSATE SODIUM 100 MG: 100 CAPSULE ORAL at 08:25

## 2021-01-01 RX ADMIN — Medication 3 MG: at 21:50

## 2021-01-01 RX ADMIN — AMLODIPINE BESYLATE 5 MG: 5 TABLET ORAL at 08:59

## 2021-01-01 RX ADMIN — DOCUSATE SODIUM 100 MG: 100 CAPSULE ORAL at 12:54

## 2021-01-01 RX ADMIN — OXYCODONE HYDROCHLORIDE 2.5 MG: 5 TABLET ORAL at 12:23

## 2021-01-01 RX ADMIN — ACETAMINOPHEN 975 MG: 325 TABLET, FILM COATED ORAL at 21:40

## 2021-01-01 RX ADMIN — POLYETHYLENE GLYCOL 3350 17 G: 17 POWDER, FOR SOLUTION ORAL at 04:00

## 2021-01-01 RX ADMIN — ACETAMINOPHEN 650 MG: 325 TABLET, FILM COATED ORAL at 07:10

## 2021-01-01 RX ADMIN — GLYCERIN, HYPROMELLOSE, POLYETHYLENE GLYCOL 1 DROP: .2; .2; 1 LIQUID OPHTHALMIC at 09:09

## 2021-01-01 RX ADMIN — MULTIPLE VITAMINS W/ MINERALS TAB 1 TABLET: TAB ORAL at 10:06

## 2021-01-01 RX ADMIN — PANTOPRAZOLE SODIUM 40 MG: 40 TABLET, DELAYED RELEASE ORAL at 05:57

## 2021-01-01 RX ADMIN — LEVOTHYROXINE SODIUM 100 MCG: 100 TABLET ORAL at 09:12

## 2021-01-01 RX ADMIN — DOCUSATE SODIUM 100 MG: 100 CAPSULE, LIQUID FILLED ORAL at 08:59

## 2021-01-01 RX ADMIN — AMLODIPINE BESYLATE 5 MG: 5 TABLET ORAL at 12:29

## 2021-01-01 RX ADMIN — MULTIPLE VITAMINS W/ MINERALS TAB 1 TABLET: TAB ORAL at 12:54

## 2021-01-01 RX ADMIN — REMDESIVIR 200 MG: 100 INJECTION, POWDER, LYOPHILIZED, FOR SOLUTION INTRAVENOUS at 11:54

## 2021-01-01 RX ADMIN — PANTOPRAZOLE SODIUM 40 MG: 40 TABLET, DELAYED RELEASE ORAL at 06:27

## 2021-01-01 RX ADMIN — MULTIPLE VITAMINS W/ MINERALS TAB 1 TABLET: TAB ORAL at 11:32

## 2021-01-01 RX ADMIN — GLYCERIN, HYPROMELLOSE, POLYETHYLENE GLYCOL 1 DROP: .2; .2; 1 LIQUID OPHTHALMIC at 17:52

## 2021-01-01 RX ADMIN — ATORVASTATIN CALCIUM 40 MG: 40 TABLET, FILM COATED ORAL at 09:20

## 2021-01-01 RX ADMIN — Medication 3 MG: at 21:46

## 2021-01-01 RX ADMIN — ATORVASTATIN CALCIUM 40 MG: 40 TABLET, FILM COATED ORAL at 12:28

## 2021-01-01 RX ADMIN — ASPIRIN 81 MG: 81 TABLET, CHEWABLE ORAL at 11:34

## 2021-01-01 RX ADMIN — ACETAMINOPHEN 975 MG: 325 TABLET, FILM COATED ORAL at 05:14

## 2021-01-01 RX ADMIN — LIDOCAINE 1 PATCH: 50 PATCH TOPICAL at 10:11

## 2021-01-01 RX ADMIN — GLYCERIN, HYPROMELLOSE, POLYETHYLENE GLYCOL 1 DROP: .2; .2; 1 LIQUID OPHTHALMIC at 17:34

## 2021-01-01 RX ADMIN — ACETAMINOPHEN 975 MG: 325 TABLET, FILM COATED ORAL at 05:05

## 2021-01-01 RX ADMIN — LIDOCAINE 1 PATCH: 50 PATCH TOPICAL at 21:33

## 2021-01-01 RX ADMIN — ACETAMINOPHEN 975 MG: 325 TABLET, FILM COATED ORAL at 14:07

## 2021-01-01 RX ADMIN — Medication 3 MG: at 21:41

## 2021-01-01 RX ADMIN — GLYCERIN, HYPROMELLOSE, POLYETHYLENE GLYCOL 1 DROP: .2; .2; 1 LIQUID OPHTHALMIC at 17:29

## 2021-01-01 RX ADMIN — LIDOCAINE 1 PATCH: 50 PATCH TOPICAL at 12:31

## 2021-01-01 RX ADMIN — POTASSIUM CHLORIDE 40 MEQ: 1500 TABLET, EXTENDED RELEASE ORAL at 08:35

## 2021-01-01 RX ADMIN — LORAZEPAM 1 MG: 2 INJECTION INTRAMUSCULAR; INTRAVENOUS at 22:36

## 2021-01-01 RX ADMIN — GLYCERIN, HYPROMELLOSE, POLYETHYLENE GLYCOL 1 DROP: .2; .2; 1 LIQUID OPHTHALMIC at 12:32

## 2021-01-01 RX ADMIN — AMLODIPINE BESYLATE 5 MG: 5 TABLET ORAL at 08:25

## 2021-01-01 RX ADMIN — AMLODIPINE BESYLATE 5 MG: 5 TABLET ORAL at 09:29

## 2021-01-01 RX ADMIN — ACETAMINOPHEN 975 MG: 325 TABLET, FILM COATED ORAL at 14:51

## 2021-01-01 RX ADMIN — ATORVASTATIN CALCIUM 40 MG: 40 TABLET, FILM COATED ORAL at 08:25

## 2021-01-01 RX ADMIN — AMLODIPINE BESYLATE 5 MG: 5 TABLET ORAL at 09:20

## 2021-01-01 RX ADMIN — SENNOSIDES 8.6 MG: 8.6 TABLET ORAL at 22:13

## 2021-01-01 RX ADMIN — DOCUSATE SODIUM 100 MG: 100 CAPSULE ORAL at 17:25

## 2021-01-01 RX ADMIN — AMLODIPINE BESYLATE 5 MG: 5 TABLET ORAL at 10:06

## 2021-01-01 RX ADMIN — PANTOPRAZOLE SODIUM 40 MG: 40 TABLET, DELAYED RELEASE ORAL at 05:09

## 2021-01-01 RX ADMIN — ENOXAPARIN SODIUM 70 MG: 80 INJECTION SUBCUTANEOUS at 20:15

## 2021-01-01 RX ADMIN — ACETAMINOPHEN 650 MG: 325 TABLET, FILM COATED ORAL at 18:02

## 2021-01-01 RX ADMIN — DOCUSATE SODIUM 100 MG: 100 CAPSULE ORAL at 09:28

## 2021-01-01 RX ADMIN — MULTIPLE VITAMINS W/ MINERALS TAB 1 TABLET: TAB ORAL at 09:28

## 2021-01-01 RX ADMIN — LEVOTHYROXINE SODIUM 100 MCG: 100 TABLET ORAL at 09:02

## 2021-01-01 RX ADMIN — Medication 3 MG: at 23:03

## 2021-01-01 RX ADMIN — SODIUM CHLORIDE 75 ML/HR: 0.9 INJECTION, SOLUTION INTRAVENOUS at 11:54

## 2021-01-01 RX ADMIN — MULTIPLE VITAMINS W/ MINERALS TAB 1 TABLET: TAB ORAL at 10:12

## 2021-01-01 RX ADMIN — LIDOCAINE 1 PATCH: 50 PATCH TOPICAL at 08:26

## 2021-01-01 RX ADMIN — LEVOTHYROXINE SODIUM 100 MCG: 100 TABLET ORAL at 12:54

## 2021-01-01 RX ADMIN — RIVAROXABAN 15 MG: 15 TABLET, FILM COATED ORAL at 08:59

## 2021-01-01 RX ADMIN — ATORVASTATIN CALCIUM 40 MG: 40 TABLET, FILM COATED ORAL at 08:59

## 2021-01-01 RX ADMIN — Medication 3 MG: at 21:33

## 2021-01-01 RX ADMIN — LEVOTHYROXINE SODIUM 100 MCG: 100 TABLET ORAL at 12:28

## 2021-01-01 RX ADMIN — GLYCERIN, HYPROMELLOSE, POLYETHYLENE GLYCOL 1 DROP: .2; .2; 1 LIQUID OPHTHALMIC at 09:21

## 2021-01-01 RX ADMIN — DOCUSATE SODIUM 100 MG: 100 CAPSULE ORAL at 09:09

## 2021-01-01 RX ADMIN — POTASSIUM CHLORIDE 40 MEQ: 1500 TABLET, EXTENDED RELEASE ORAL at 11:41

## 2021-01-01 RX ADMIN — SENNOSIDES 8.6 MG: 8.6 TABLET ORAL at 21:36

## 2021-01-01 RX ADMIN — DOCUSATE SODIUM 100 MG: 100 CAPSULE ORAL at 17:52

## 2021-01-01 RX ADMIN — ACETAMINOPHEN 975 MG: 325 TABLET, FILM COATED ORAL at 21:46

## 2021-01-01 RX ADMIN — SENNOSIDES 8.6 MG: 8.6 TABLET ORAL at 22:56

## 2021-01-01 RX ADMIN — POLYETHYLENE GLYCOL 3350 17 G: 17 POWDER, FOR SOLUTION ORAL at 15:50

## 2021-01-01 RX ADMIN — SODIUM CHLORIDE 75 ML/HR: 0.9 INJECTION, SOLUTION INTRAVENOUS at 18:33

## 2021-01-01 RX ADMIN — SENNOSIDES 8.6 MG: 8.6 TABLET ORAL at 21:50

## 2021-01-01 RX ADMIN — OXYCODONE HYDROCHLORIDE 2.5 MG: 5 TABLET ORAL at 04:15

## 2021-01-01 RX ADMIN — Medication 3 MG: at 21:58

## 2021-01-01 RX ADMIN — BENZONATATE 200 MG: 100 CAPSULE ORAL at 20:15

## 2021-01-01 RX ADMIN — MULTIPLE VITAMINS W/ MINERALS TAB 1 TABLET: TAB ORAL at 09:09

## 2021-08-19 PROBLEM — I48.0 PAF (PAROXYSMAL ATRIAL FIBRILLATION) (HCC): Status: ACTIVE | Noted: 2021-01-01

## 2021-08-19 PROBLEM — C79.9 METASTATIC DISEASE (HCC): Status: ACTIVE | Noted: 2021-01-01

## 2021-08-19 PROBLEM — R91.8 LUNG MASS: Status: ACTIVE | Noted: 2021-01-01

## 2021-08-19 PROBLEM — Z85.09: Status: ACTIVE | Noted: 2021-01-01

## 2021-08-19 PROBLEM — R11.0 NAUSEA: Status: ACTIVE | Noted: 2021-01-01

## 2021-08-19 NOTE — H&P
[facility]  H&P- Alex Castro 10/5/1928, 80 y o  female MRN: [id number]  Unit/Bed#: ED 18 Encounter: [id number]  Primary Care Provider: Cam Castillo   Date and time admitted to hospital: 8/19/2021  1:53 PM         [facility] - History and Physical:       Alex Castro 80 y o  female MRN: [id number]  Unit/Bed#: ED 25 Encounter: [id number]  Admitting Physician: Zofia Cooper MD  PCP: Cam Castillo  Date of Admission:  08/19/21        Assessment and Plan:     * Metastatic disease ([facility] [address] )  Assessment & Plan  · Reported on CT abdomen and pelvis  · Consult to Oncology placed  · Will also consult palliative Care to address goals of care  · Discussed CT findings with family members present at the bedside  Based my conversation with them they probably would not want to pursue aggressive measures    Lung mass  Assessment & Plan  · Consult to Pulmonary Medicine placed    Abdominal pain/Nausea  Assessment & Plan  · Probably due to metastatic disease  · Patient would like to eat a regular diet  · Aspiration precautions  · Will order IV Zofran as needed for nausea and vomiting    History of malignant neoplasm of ampulla of Vater  Assessment & Plan  Noted    PAF (paroxysmal atrial fibrillation) ([facility])  Assessment & Plan  · Continue Xarelto    ASHLEY (acute kidney injury) ([facility] [address] )  Assessment & Plan  · Probably due to dehydration in combination with hydrochlorothiazide use  · Start IV fluid hydration  · Hold hydrochlorothiazide for now  · Repeat labs in a m  Hypertension  Assessment & Plan  · Continue amlodipine    Hypothyroidism  Assessment & Plan  · Continue levothyroxine            VTE Prophylaxis: Rivaroxaban (Xarelto)  / sequential compression device   Code Status: DNR/DNI  Discussion with family:  Present at bedside    Anticipated Length of Stay:  Patient will be admitted on an Inpatient basis with an anticipated length of stay of  2 midnights     Justification for Hospital Stay:  Confusion    Total Time for Visit, including Counseling / Coordination of Care: 30 minutes  Greater than 50% of this total time spent on direct patient counseling and coordination of care  Chief Complaint:   Confusion/failure to thrive    History of Present Illness:    Julio Cesar Price is a 80 y o  female was brought to the ED tonight by her family members because her son noticed that she was more confused  In addition the patient has been taking her meds and she has been having decreased oral intake  The patient stated that she has been having episodes of nausea and it occurs every time she eats or drinks something  She has also been having abdominal pain and she was supposed to have a CT of her abdomen tomorrow  Instead CT of the abdomen was done tonight in the ED and it showed evidence of metastatic disease, as well as a lung mass  The patient never had any episodes of vomiting  She still has abdominal pain but she stated that it is very mild and is not asking for any pain medications  She reported feeling constipated but does not recall when she last had a bowel movement  Review of Systems:    Review of Systems   Constitutional: Positive for activity change  HENT: Negative  Eyes: Negative  Respiratory: Negative  Gastrointestinal: Positive for abdominal pain, constipation and nausea  Endocrine: Negative  Genitourinary: Negative  Musculoskeletal: Negative  Skin: Negative  Neurological: Negative  Psychiatric/Behavioral: Negative          Past Medical and Surgical History:     Past Medical History:   Diagnosis Date    Atheroma of artery     aorta    History of stroke     bifrontal CVA    Hypertension     Hypothyroidism     Intrahepatic bile duct stones     Irritable bowel syndrome     Murmur     Stroke [facility])        Past Surgical History:   Procedure Laterality Date    APPENDECTOMY      CARDIAC SURGERY      reveal linq cardiac monitor 5-26-15    CARPAL TUNNEL RELEASE      CHOLECYSTECTOMY      ERCP N/A 3/21/2016    Procedure: ENDOSCOPIC RETROGRADE CHOLANGIOPANCREATOGRAPHY (ERCP); Surgeon: Vane Leahy MD;  [facility]; Service:     LA EGD TRANSORAL BIOPSY SINGLE/MULTIPLE N/A 3/21/2016    Procedure: RADIAL ENDOSCOPIC U/S;  Surgeon: Vane Leahy MD;  [facility]; Service: Gastroenterology    LA ESOPHAGOGASTRODUODENOSCOPY TRANSORAL DIAGNOSTIC N/A 5/12/2016    Procedure: ESOPHAGOGASTRODUODENOSCOPY (EGD); Surgeon: Vane Leahy MD;  [facility]; Service: Gastroenterology    TONSILLECTOMY         Meds/Allergies:    Prior to Admission medications    Medication Sig Start Date End Date Taking? Authorizing Provider   amLODIPine (NORVASC) 5 mg tablet 7 5 mg oral once daily(take 1 tablet of 5 mg and 1 tablet of 2 5 mg 3/22/16   Neysa Kayser, MD   aspirin 81 MG tablet Take 81 mg by mouth daily  Historical Provider, MD   atorvastatin (LIPITOR) 40 mg tablet Take 40 mg by mouth daily  Historical Provider, MD   hydrochlorothiazide (HYDRODIURIL) 12 5 mg tablet Take 12 5 mg by mouth daily    Historical Provider, MD   levothyroxine 100 mcg tablet Take 100 mcg by mouth daily  Historical Provider, MD   Multiple Vitamins-Minerals (PRESERVISION AREDS 2 PO) Take by mouth daily  Historical Provider, MD   rivaroxaban (XARELTO) tablet Take 15 mg by mouth daily  Historical Provider, MD     I have reviewed home medications with patient personally  Allergies: Allergies   Allergen Reactions    Tetanus Toxoids        Social History:     Marital Status:       Social History     Substance and Sexual Activity   Alcohol Use No     Social History     Tobacco Use   Smoking Status Former Smoker   Smokeless Tobacco Never Used     Social History     Substance and Sexual Activity   Drug Use No       Family History:    non-contributory    Physical Exam:     Vitals:   Blood Pressure: 134/64 (08/19/21 1516)  Pulse: 67 (08/19/21 1516)  Temperature: (!) 96 6 °F (35 9 °C) (08/19/21 1221)  Temp Source: Tympanic (08/19/21 1221)  Respirations: 18 (08/19/21 1516)  Height: 5' 2" (157 5 cm) (08/19/21 1221)  Weight - Scale: 77 1 kg (170 lb) (08/19/21 1221)  SpO2: 93 % (08/19/21 1516)    Physical Exam  Constitutional:       Appearance: Normal appearance  HENT:      Head: Normocephalic and atraumatic  Eyes:      Extraocular Movements: Extraocular movements intact  Pupils: Pupils are equal, round, and reactive to light  Cardiovascular:      Rate and Rhythm: Normal rate and regular rhythm  Pulmonary:      Effort: Pulmonary effort is normal       Breath sounds: Normal breath sounds  Abdominal:      General: Bowel sounds are normal       Palpations: Abdomen is soft  Musculoskeletal:         General: Normal range of motion  Cervical back: Neck supple  Skin:     General: Skin is warm and dry  Neurological:      Mental Status: She is alert and oriented to person, place, and time  Psychiatric:         Mood and Affect: Mood normal        Additional Data:     Lab Results: I have personally reviewed pertinent reports  Results from last 7 days   Lab Units 08/19/21  1515   WBC Thousand/uL 9 38   HEMOGLOBIN g/dL 10 2*   HEMATOCRIT % 32 1*   PLATELETS Thousands/uL 357   NEUTROS PCT % 80*   LYMPHS PCT % 11*   MONOS PCT % 8   EOS PCT % 1     Results from last 7 days   Lab Units 08/19/21  1515   SODIUM mmol/L 134*   POTASSIUM mmol/L 4 1   CHLORIDE mmol/L 101   CO2 mmol/L 30   BUN mg/dL 34*   CREATININE mg/dL 1 54*   ANION GAP mmol/L 3*   CALCIUM mg/dL 9 5   ALBUMIN g/dL 1 9*   TOTAL BILIRUBIN mg/dL 0 43   ALK PHOS U/L 187*   ALT U/L 17   AST U/L 24   GLUCOSE RANDOM mg/dL 101                       Imaging: I have personally reviewed pertinent reports  CT abdomen pelvis wo contrast   Final Result by Julian Leggett MD (08/19 1950)      1   Partially visualized at least 7 5 cm left lower lobe lung mass, concerning for primary malignancy  Nonemergent chest CT with contrast is recommended for further evaluation  2  A 2 8 cm peritoneal soft tissue nodule adjacent to the inferior right hepatic lobe, concerning for metastatic disease  The study was marked in EPIC for significant notification  Workstation performed: [id number]             Allscripts / Epic Records Reviewed: Yes     ** Please Note: This note has been constructed using a voice recognition system   **

## 2021-08-19 NOTE — ED ATTENDING ATTESTATION
8/19/2021  Tal Carrera DO, saw and evaluated the patient  I have discussed the patient with the resident/non-physician practitioner and agree with the resident's/non-physician practitioner's findings, Plan of Care, and MDM as documented in the resident's/non-physician practitioner's note, except where noted  All available labs and Radiology studies were reviewed  I was present for key portions of any procedure(s) performed by the resident/non-physician practitioner and I was immediately available to provide assistance  At this point I agree with the current assessment done in the Emergency Department  I have conducted an independent evaluation of this patient a history and physical is as follows:    26-year-old woman who lives independently at home presents for evaluation of failure to thrive, possible UTI increased in progressively worsening confusion, not remembering to take her medications sometimes taking too many of her medications and forgetting to eat or drink  When she does eat or drink she seems to have abdominal pain  She had a CT scan that was ordered by Dr Magali Rubinstein in for further evaluation however this was canceled by the patient's son has she was brought to the ER today instead  Patient does not seem to be able to live independently anymore  She seems to be confused and forgetful to the point where she is unable to care for herself  Impression:  Failure to thrive, possible UTI, medication noncompliance due to forgetfulness and possible misuse also due to forgetfulness  Plan:  Broad workup to evaluate for medical causes of patient's current symptoms  May require admission for further evaluation and treatment        ED Course         Critical Care Time  Procedures

## 2021-08-19 NOTE — CASE MANAGEMENT
Pt is NOT A Bundle  Pt is NOT A 30 Day Readmission    CM spoke to pt's son as CM was informed that the family feels the pt may need long term care  CM reviewed options with the pt's son (SNF rehab option vs long term care d/c)  After some deliberation, the pt's son prefers a SNF d/c  CM provided him with a list of locations in his area  He will review with siblings and provide options  CM explained that pt will likely remain in the hospital through the weekend and will need therapy evaluations in order for the pt's [employer] to provide authorization for SNF  Son aware and in agreement  ED resident also notified

## 2021-08-19 NOTE — ASSESSMENT & PLAN NOTE
· Probably due to dehydration in combination with hydrochlorothiazide use  · Start IV fluid hydration  · Hold hydrochlorothiazide for now  · Repeat labs in a m

## 2021-08-19 NOTE — ASSESSMENT & PLAN NOTE
· Reported on CT abdomen and pelvis  · Consult to Oncology placed  · Will also consult palliative Care to address goals of care

## 2021-08-19 NOTE — ASSESSMENT & PLAN NOTE
· Probably due to metastatic disease  · Patient would like to eat a regular diet  · Aspiration precautions  · Will order IV Zofran as needed for nausea and vomiting

## 2021-08-19 NOTE — ED PROVIDER NOTES
History  Chief Complaint   Patient presents with    Failure To Thrive     Per son pt is more confused, not taking her medications, not eating or drinking  Concerned for UTI, also requesting SNF placement  80 y o F w/ h/o bifrontal CVA, HTN, Hyperthyroidism, IBS presents with concerns from family that she isn't taking care of herself  Patient was scheduled for a CT scan of her abdomen tomorrow because she has been having abdominal pain and is not tolerating feeds  When she eats or drinks she gets nauseous and feels like throwing up  This problem has been occurring for about 3 weeks  In this time she has also been forgetting to take meds and otherwise acting abnormally per family  They attribute this to old age and dementia  On examination, patient has mild, vague complaints of abdominal discomfort, forgetfullness, and nausea that at this time is resolved  Pt has both covid vaccines      Prior to Admission Medications   Prescriptions Last Dose Informant Patient Reported? Taking? Multiple Vitamins-Minerals (PRESERVISION AREDS 2 PO)   Yes No   Sig: Take by mouth daily  amLODIPine (NORVASC) 5 mg tablet   No No   Si 5 mg oral once daily(take 1 tablet of 5 mg and 1 tablet of 2 5 mg   aspirin 81 MG tablet   Yes No   Sig: Take 81 mg by mouth daily  atorvastatin (LIPITOR) 40 mg tablet   Yes No   Sig: Take 40 mg by mouth daily  hydrochlorothiazide (HYDRODIURIL) 12 5 mg tablet   Yes No   Sig: Take 12 5 mg by mouth daily   levothyroxine 100 mcg tablet   Yes No   Sig: Take 100 mcg by mouth daily  rivaroxaban (XARELTO) tablet   Yes No   Sig: Take 15 mg by mouth daily        Facility-Administered Medications: None       Past Medical History:   Diagnosis Date    Atheroma of artery     aorta    History of stroke     bifrontal CVA    Hypertension     Hypothyroidism     Intrahepatic bile duct stones     Irritable bowel syndrome     Murmur     Stroke [facility])        Past Surgical History:   Procedure Laterality Date    APPENDECTOMY      CARDIAC SURGERY      reveal linq cardiac monitor 5-26-15    CARPAL TUNNEL RELEASE      CHOLECYSTECTOMY      ERCP N/A 3/21/2016    Procedure: ENDOSCOPIC RETROGRADE CHOLANGIOPANCREATOGRAPHY (ERCP); Surgeon: Shira Fernandez MD;  [facility]: [facility]; Service:     DE EGD TRANSORAL BIOPSY SINGLE/MULTIPLE N/A 3/21/2016    Procedure: RADIAL ENDOSCOPIC U/S;  Surgeon: Shira Fernandez MD;  [facility]; Service: Gastroenterology    DE ESOPHAGOGASTRODUODENOSCOPY TRANSORAL DIAGNOSTIC N/A 5/12/2016    Procedure: ESOPHAGOGASTRODUODENOSCOPY (EGD); Surgeon: Shira Fernandez MD;  [facility]; Service: Gastroenterology    TONSILLECTOMY         Family History   Problem Relation Age of Onset    Stroke Mother      I have reviewed and agree with the history as documented  E-Cigarette/Vaping    E-Cigarette Use Never User      E-Cigarette/Vaping Substances     Social History     Tobacco Use    Smoking status: Former Smoker    Smokeless tobacco: Never Used   Vaping Use    Vaping Use: Never used   Substance Use Topics    Alcohol use: Not Currently    Drug use: No        Review of Systems   Constitutional: Negative for chills and fever  HENT: Negative for sore throat  Respiratory: Negative for chest tightness and shortness of breath  Cardiovascular: Positive for leg swelling  Negative for chest pain  Gastrointestinal: Positive for nausea  Negative for vomiting  Skin: Negative for rash and wound  Neurological: Negative for tremors, weakness, numbness and headaches  Psychiatric/Behavioral: Positive for confusion  All other systems reviewed and are negative        Physical Exam  ED Triage Vitals   Temperature Pulse Respirations Blood Pressure SpO2   08/19/21 1221 08/19/21 1221 08/19/21 1221 08/19/21 1221 08/19/21 1221   (!) 96 6 °F (35 9 °C) 72 18 105/53 94 %      Temp Source Heart Rate Source Patient Position - Orthostatic VS BP Location FiO2 (%)   08/19/21 1221 08/19/21 1221 08/19/21 1221 08/19/21 1221 --   Tympanic Monitor Sitting Left arm       Pain Score       08/19/21 2206       No Pain             Orthostatic Vital Signs  Vitals:    08/19/21 2231 08/19/21 2332 08/20/21 0000 08/20/21 0801   BP: 115/76 (!) 118/48  118/50   Pulse: 73 59 68 72   Patient Position - Orthostatic VS:  Lying         Physical Exam  Vitals reviewed  Constitutional:       General: She is not in acute distress  HENT:      Head: Normocephalic and atraumatic  Mouth/Throat:      Pharynx: Oropharynx is clear  Eyes:      Extraocular Movements: Extraocular movements intact  Conjunctiva/sclera: Conjunctivae normal    Cardiovascular:      Rate and Rhythm: Normal rate and regular rhythm  Pulses: Normal pulses  Heart sounds: Normal heart sounds  Pulmonary:      Effort: Pulmonary effort is normal       Breath sounds: Normal breath sounds  Abdominal:      Palpations: Abdomen is soft  Tenderness: There is abdominal tenderness (mild, diffuse)  Musculoskeletal:      Right lower leg: Edema present  Left lower leg: Edema present  Skin:     General: Skin is warm  Neurological:      General: No focal deficit present  Mental Status: She is alert and oriented to person, place, and time     Psychiatric:         Behavior: Behavior normal          ED Medications  Medications   sodium chloride 0 9 % infusion (75 mL/hr Intravenous New Bag 8/19/21 2114)   aspirin chewable tablet 81 mg (has no administration in time range)   amLODIPine (NORVASC) tablet 5 mg (has no administration in time range)   atorvastatin (LIPITOR) tablet 40 mg (has no administration in time range)   levothyroxine tablet 100 mcg (has no administration in time range)   multivitamin-minerals (CENTRUM) tablet 1 tablet (has no administration in time range)   rivaroxaban (XARELTO) tablet 15 mg (15 mg Oral Given 8/19/21 2114)   ondansetron (ZOFRAN) injection 4 mg (has no administration in time range)   melatonin tablet 3 mg (3 mg Oral Refused 8/19/21 2123)   senna (SENOKOT) tablet 8 6 mg (has no administration in time range)   polyethylene glycol (MIRALAX) packet 17 g (has no administration in time range)   bisacodyl (DULCOLAX) rectal suppository 10 mg (has no administration in time range)   sodium chloride 0 9 % bolus 1,000 mL (1,000 mL Intravenous New Bag 8/19/21 1938)       Diagnostic Studies  Results Reviewed     Procedure Component Value Units Date/Time    TSH, 3rd generation [[id number]]  (Normal) Collected: 08/20/21 0500    Lab Status: Final result Specimen: Blood from Arm, Left Updated: 08/20/21 0628     TSH 3RD GENERATON 1 020 uIU/mL     Narrative:      Patients undergoing fluorescein dye angiography may retain small amounts of fluorescein in the body for 48-72 hours post procedure  Samples containing fluorescein can produce falsely depressed TSH values  If the patient had this procedure,a specimen should be resubmitted post fluorescein clearance        Comprehensive metabolic panel [[id number]]  (Abnormal) Collected: 08/20/21 0500    Lab Status: Final result Specimen: Blood from Arm, Left Updated: 08/20/21 9155     Sodium 137 mmol/L      Potassium 3 2 mmol/L      Chloride 105 mmol/L      CO2 27 mmol/L      ANION GAP 5 mmol/L      BUN 30 mg/dL      Creatinine 1 19 mg/dL      Glucose 94 mg/dL      Calcium 8 5 mg/dL      Corrected Calcium 10 5 mg/dL      AST 17 U/L      ALT 14 U/L      Alkaline Phosphatase 152 U/L      Total Protein 5 8 g/dL      Albumin 1 5 g/dL      Total Bilirubin 0 39 mg/dL      eGFR 40 ml/min/1 73sq m     Narrative:      [place] guidelines for Chronic Kidney Disease (CKD):     Stage 1 with normal or high GFR (GFR > 90 mL/min/1 73 square meters)    Stage 2 Mild CKD (GFR = 60-89 mL/min/1 73 square meters)    Stage 3A Moderate CKD (GFR = 45-59 mL/min/1 73 square meters)    Stage 3B Moderate CKD (GFR = 30-44 mL/min/1 73 square meters)    Stage 4 Severe CKD (GFR = 15-29 mL/min/1 73 square meters)    Stage 5 End Stage CKD (GFR <15 mL/min/1 73 square meters)  Note: GFR calculation is accurate only with a steady state creatinine    Phosphorus [[id number]]  (Normal) Collected: 08/20/21 0500    Lab Status: Final result Specimen: Blood from Arm, Left Updated: 08/20/21 0628     Phosphorus 2 4 mg/dL     Magnesium [[id number]]  (Normal) Collected: 08/20/21 0500    Lab Status: Final result Specimen: Blood from Arm, Left Updated: 08/20/21 0616     Magnesium 1 7 mg/dL     CBC (With Platelets) [[id number]]  (Abnormal) Collected: 08/20/21 0500    Lab Status: Final result Specimen: Blood from Arm, Left Updated: 08/20/21 0542     WBC 9 31 Thousand/uL      RBC 3 25 Million/uL      Hemoglobin 8 8 g/dL      Hematocrit 27 3 %      MCV 84 fL      MCH 27 1 pg      MCHC 32 2 g/dL      RDW 15 9 %      Platelets 606 Thousands/uL      MPV 12 0 fL     Urinalysis with microscopic [[id number]]  (Abnormal) Collected: 08/20/21 0237    Lab Status: Final result Specimen: Urine, Clean Catch Updated: 08/20/21 0316     Clarity, UA Clear     Color, UA Yellow     Specific Gravity, UA 1 014     pH, UA 5 5     Glucose, UA Negative mg/dl      Ketones, UA Negative mg/dl      Blood, UA Trace     Protein, UA Negative mg/dl      Nitrite, UA Negative     Bilirubin, UA Negative     Urobilinogen, UA 0 2 E U /dl      Leukocytes, UA Trace     WBC, UA 4-10 /hpf      RBC, UA None Seen /hpf      Hyaline Casts, UA None Seen /lpf      Bacteria, UA None Seen /hpf      Epithelial Cells None Seen /hpf     Urine Microscopic [[id number]]  (Abnormal) Collected: 08/19/21 1655    Lab Status: Final result Specimen: Urine, Other Updated: 08/19/21 1711     RBC, UA None Seen /hpf      WBC, UA 10-20 /hpf      Epithelial Cells Moderate /hpf      Bacteria, UA None Seen /hpf      Hyaline Casts, UA 5-10 /lpf     Urine culture [[id number]] Collected: 08/19/21 1655    Lab Status:  In process Specimen: Urine, Other Updated: 08/19/21 1711    Urine Macroscopic, POC [[id number]]  (Abnormal) Collected: 08/19/21 1655    Lab Status: Final result Specimen: Urine Updated: 08/19/21 1656     Color, UA Yellow     Clarity, UA Clear     pH, UA 5 5     Leukocytes, UA Small     Nitrite, UA Negative     Protein, UA Negative mg/dl      Glucose, UA Negative mg/dl      Ketones, UA Negative mg/dl      Urobilinogen, UA 0 2 E U /dl      Bilirubin, UA Negative     Blood, UA Negative     Specific Gravity, UA 1 020    Narrative:      CL[facility] RESULT    Comprehensive metabolic panel [[id number]]  (Abnormal) Collected: 08/19/21 1515    Lab Status: Final result Specimen: Blood from Arm, Right Updated: 08/19/21 1641     Sodium 134 mmol/L      Potassium 4 1 mmol/L      Chloride 101 mmol/L      CO2 30 mmol/L      ANION GAP 3 mmol/L      BUN 34 mg/dL      Creatinine 1 54 mg/dL      Glucose 101 mg/dL      Calcium 9 5 mg/dL      Corrected Calcium 11 2 mg/dL      AST 24 U/L      ALT 17 U/L      Alkaline Phosphatase 187 U/L      Total Protein 7 2 g/dL      Albumin 1 9 g/dL      Total Bilirubin 0 43 mg/dL      eGFR 29 ml/min/1 73sq m     Narrative:      [employer] guidelines for Chronic Kidney Disease (CKD):     Stage 1 with normal or high GFR (GFR > 90 mL/min/1 73 square meters)    Stage 2 Mild CKD (GFR = 60-89 mL/min/1 73 square meters)    Stage 3A Moderate CKD (GFR = 45-59 mL/min/1 73 square meters)    Stage 3B Moderate CKD (GFR = 30-44 mL/min/1 73 square meters)    Stage 4 Severe CKD (GFR = 15-29 mL/min/1 73 square meters)    Stage 5 End Stage CKD (GFR <15 mL/min/1 73 square meters)  Note: GFR calculation is accurate only with a steady state creatinine    TSH [[id number]]  (Normal) Collected: 08/19/21 1515    Lab Status: Final result Specimen: Blood from Arm, Right Updated: 08/19/21 1641     TSH 3RD GENERATON 0 908 uIU/mL     Narrative:      Patients undergoing fluorescein dye angiography may retain small amounts of fluorescein in the body for 48-72 hours post procedure  Samples containing fluorescein can produce falsely depressed TSH values  If the patient had this procedure,a specimen should be resubmitted post fluorescein clearance  Lipase [[id number]]  (Normal) Collected: 08/19/21 1515    Lab Status: Final result Specimen: Blood from Arm, Right Updated: 08/19/21 1601     Lipase 185 u/L     Troponin I [[id number]]  (Normal) Collected: 08/19/21 1515    Lab Status: Final result Specimen: Blood from Arm, Right Updated: 08/19/21 1600     Troponin I <0 02 ng/mL     CBC and differential [[id number]]  (Abnormal) Collected: 08/19/21 1515    Lab Status: Final result Specimen: Blood from Arm, Right Updated: 08/19/21 1538     WBC 9 38 Thousand/uL      RBC 3 82 Million/uL      Hemoglobin 10 2 g/dL      Hematocrit 32 1 %      MCV 84 fL      MCH 26 7 pg      MCHC 31 8 g/dL      RDW 16 0 %      MPV 11 9 fL      Platelets 858 Thousands/uL      nRBC 0 /100 WBCs      Neutrophils Relative 80 %      Immat GRANS % 0 %      Lymphocytes Relative 11 %      Monocytes Relative 8 %      Eosinophils Relative 1 %      Basophils Relative 0 %      Neutrophils Absolute 7 41 Thousands/µL      Immature Grans Absolute 0 03 Thousand/uL      Lymphocytes Absolute 1 07 Thousands/µL      Monocytes Absolute 0 74 Thousand/µL      Eosinophils Absolute 0 10 Thousand/µL      Basophils Absolute 0 03 Thousands/µL                  CT abdomen pelvis wo contrast   Final Result by Pj Pabon MD (08/19 1950)      1  Partially visualized at least 7 5 cm left lower lobe lung mass, concerning for primary malignancy  Nonemergent chest CT with contrast is recommended for further evaluation  2  A 2 8 cm peritoneal soft tissue nodule adjacent to the inferior right hepatic lobe, concerning for metastatic disease  The study was marked in EPIC for significant notification        Workstation performed: [id number]               Procedures  Procedures      ED Course  ED Course as of Aug 20 1019   Thu Aug 19, 2021   1642 Comprehensive metabolic panel(!)   1085 CT abdomen pelvis with contrast                                       MDM  Number of Diagnoses or Management Options  ASHLEY (acute kidney injury) ([place] [address] )  Hypercalcemia  Liver mass  Diagnosis management comments: 79-year-old female presents with vague complaints and concerns by family that she is unable to take care of herself  She is concerned that she needs help at home  Discussed this with social Work and they recommend PT/OT evaluation as well as nursing home placement  Will order labs to evaluate her general vague symptoms including CBC, CMP, TSH, troponin, lipase  Will obtain CT abdomen and pelvis as patient was scheduled for this tomorrow and has been complaining of vomiting after attempts at p o  Intake  On re-evaluation, patient's symptoms are unchanged at this time  CT abdomen pelvis concerning for a left lower lung mass that measures greater than 7 cm in size  She also has a liver mass that is slightly changed from prior  These findings are concerning for malignancy and require further evaluation  Discussed findings with the admitting team and decision was made for inpatient admission with for further evaluation and management  Patient and family agreeable with plan        Disposition  Final diagnoses:   ASHLEY (acute kidney injury) ([place] [address] )   Hypercalcemia   Liver mass     Time reflects when diagnosis was documented in both MDM as applicable and the Disposition within this note     Time User Action Codes Description Comment    8/19/2021  7:29 PM Amira Flack Add [N17 9] ASHLEY (acute kidney injury) ([place] [address] )     8/19/2021  7:29 PM Amira Flack Add [E83 52] Hypercalcemia     8/19/2021  7:29 PM Amira Flack Add [R16 0] Liver mass     8/19/2021  7:52 PM Ledora Riis Add [C79 9] Metastatic disease ([facility] )     8/19/2021  7:52 PM Ledora Riis Add [R91 8] Lung mass     8/19/2021  8:31 PM Ledora Riis Add [R62 7] FTT (failure to thrive) in adult       ED Disposition     ED Disposition Condition Date/Time Comment    Admit Stable Thu Aug 19, 2021  7:29 PM Case was discussed with Dr Kirstin Woodson and the patient's admission status was agreed to be Admission Status: inpatient status to the service of Dr Jane Sees   Follow-up Information    None         Current Discharge Medication List      CONTINUE these medications which have NOT CHANGED    Details   amLODIPine (NORVASC) 5 mg tablet 7 5 mg oral once daily(take 1 tablet of 5 mg and 1 tablet of 2 5 mg  Qty: 30 tablet, Refills: 0      aspirin 81 MG tablet Take 81 mg by mouth daily  atorvastatin (LIPITOR) 40 mg tablet Take 40 mg by mouth daily  hydrochlorothiazide (HYDRODIURIL) 12 5 mg tablet Take 12 5 mg by mouth daily      levothyroxine 100 mcg tablet Take 100 mcg by mouth daily  Multiple Vitamins-Minerals (PRESERVISION AREDS 2 PO) Take by mouth daily  rivaroxaban (XARELTO) tablet Take 15 mg by mouth daily  No discharge procedures on file  PDMP Review       Value Time User    PDMP Reviewed  Yes 8/20/2021  7:27 AM Demetrius Flores DO           ED Provider  Attending physically available and evaluated Antelmo Garcia  I managed the patient along with the ED Attending      Electronically Signed by         Meenakshi Mercedes MD  08/20/21 0293

## 2021-08-20 PROBLEM — I35.0 AORTIC STENOSIS: Status: ACTIVE | Noted: 2021-01-01

## 2021-08-20 NOTE — PLAN OF CARE
Problem: Potential for Falls  Goal: Patient will remain free of falls  Description: INTERVENTIONS:  - Educate patient/family on patient safety including physical limitations  - Instruct patient to call for assistance with activity   - Consult OT/PT to assist with strengthening/mobility   - Keep Call bell within reach  - Keep bed low and locked with side rails adjusted as appropriate  - Keep care items and personal belongings within reach  - Initiate and maintain comfort rounds  - Make Fall Risk Sign visible to staff  - Obtain necessary fall risk management equipment  - Apply yellow socks and bracelet for high fall risk patients  - Consider moving patient to room near nurses station  Outcome: Progressing     Problem: Nutrition/Hydration-ADULT  Goal: Nutrient/Hydration intake appropriate for improving, restoring or maintaining nutritional needs  Description: Monitor and assess patient's nutrition/hydration status for malnutrition  Collaborate with interdisciplinary team and initiate plan and interventions as ordered  Monitor patient's weight and dietary intake as ordered or per policy  Utilize nutrition screening tool and intervene as necessary  Determine patient's food preferences and provide high-protein, high-caloric foods as appropriate       INTERVENTIONS:  - Monitor oral intake, urinary output, labs, and treatment plans  - Assess nutrition and hydration status and recommend course of action  - Evaluate amount of meals eaten  - Assist patient with eating if necessary   - Allow adequate time for meals  - Recommend/ encourage appropriate diets, oral nutritional supplements, and vitamin/mineral supplements  - Order, calculate, and assess calorie counts as needed  - Recommend, monitor, and adjust tube feedings and TPN/PPN based on assessed needs  - Assess need for intravenous fluids  - Provide specific nutrition/hydration education as appropriate  - Include patient/family/caregiver in decisions related to nutrition  Outcome: Progressing

## 2021-08-20 NOTE — ASSESSMENT & PLAN NOTE
· Probably due to dehydration in combination with hydrochlorothiazide use  · With IVF, creatinine improved to 1 1  · Hold hydrochlorothiazide for now, BP remains stable off HCTZ  May DC on discharge  · Repeat labs in a m  Alert/Awake

## 2021-08-20 NOTE — SPEECH THERAPY NOTE
Speech Language/Pathology Screen    Order received, chart reviewed  Pt currently on regular texture, reportedly has been well-tolerating  Spoke w/ RN who denies concerns for dysphagia/aspiration  Spoke w/ pt  Pt denies dysphagia, odynophagia, s/s aspiration, and/or speech/language deficits at this time  RN providing oral medications while SLP speaking w/ pt  Pt took several pills at once w/ rapid consecutive sips thin liquid without noted difficulty or overt s/s aspiration  No formal speech/swallow evaluation indicated at this time, please re-consult if medically necessary

## 2021-08-20 NOTE — ASSESSMENT & PLAN NOTE
known moderate aortic stenosis  Last echo was 2015     Follows with OP cardio Dr Montilla Carrier office for PAF

## 2021-08-20 NOTE — UTILIZATION REVIEW
Initial Clinical Review    Admission: Date/Time/Statement:   Admission Orders (From admission, onward)     Ordered        08/19/21 1930  Inpatient Admission  Once                   Orders Placed This Encounter   Procedures    Inpatient Admission     Standing Status:   Standing     Number of Occurrences:   1     Order Specific Question:   Level of Care     Answer:   Med Surg [16]     Order Specific Question:   Estimated length of stay     Answer:   More than 2 Midnights     Order Specific Question:   Certification     Answer:   I certify that inpatient services are medically necessary for this patient for a duration of greater than two midnights  See H&P and MD Progress Notes for additional information about the patient's course of treatment  ED Arrival Information     Expected Arrival Acuity    - 8/19/2021 11:48 Urgent         Means of arrival Escorted by Service Admission type    Walk-In Family Member Hospitalist Urgent         Arrival complaint    Not Eating        Chief Complaint   Patient presents with    Failure To Thrive     Per son pt is more confused, not taking her medications, not eating or drinking  Concerned for UTI, also requesting SNF placement  Initial Presentation: 81 y/o female presents to [facility] ED via personal vehicle for evaluation d/t her son noticed that she was more confused  In addition the patient has been taking her meds and she has been having decreased oral intake  The patient stated that she has been having episodes of nausea and it occurs every time she eats or drinks something  She has also been having abdominal pain and she was supposed to have a CT of her abdomen tomorrow  Instead CT of the abdomen was done tonight in the ED and it showed evidence of metastatic disease, as well as a lung mass  She still has abdominal pain but she stated that it is very mild and is not asking for any pain medications    She reported feeling constipated but does not recall when she last had a bowel movement  Discussed CT findings with family members present at the bedside  Based on drs conversation with them they probably would not want to pursue aggressive measures  Admit Inpatient med surg d/t metastatic disease: Oncology, pulmonary medicine and palliative care consult  Zofran prn, aspiration precautions, continue home med except hold HCTZ for now, start IVF hydration, repeat labs  Date: 8/20   Day 2: Per Hospitalist Note:  Sitting up on the sofa and watching TV  Not in distress  Pleasant, on room air  After discussion with daughter, decision was made to obtain IR guided lung biopsy  The patient will continue to require additional inpatient hospital stay due to pending lung biopsy  8/20 Palliative Care Consult:  -continue current medical care/optimization  -sons wish to speak with each other further regarding plan of care moving forward, office contact provided  -confirmed level 3 code status, DNAR/DNI  -sons wish to pursue SNF/rehab with option of transition to LTC at this time, prefer facility near [place], d/w   -start bowel regimen              -senna 8 6mg nightly              -prn miralax daily              -prn dulcolax suppository daily  -delirium precautions - Please minimize interruptions and prioritize sleep at night  No TV nor screen time at night  Shades drawn at night  During day, shades up, minimize napping, and encourage meals in chair  -d/w primary via tigertext  -will follow peripherally as needed, will f/u Monday 8/23/21 8/20 Oncology Consult:  Pt has 15 lb weight loss since a year ago  Her functionality has been slowly declining over the last 6 months  Clinically, this is likely to be metastatic lung cancer  Since she has very limited smoking history, her lung cancer may have targetable mutation such as EGFR or ALK  Based on the stage, she is not a surgical candidate    Based on her advanced age as well as relatively poor performance status, I do not think she is a candidate for systemic chemotherapy  However, TKI may be considered if she has targetable mutation  Therefore, we had long discussion with patient as well as her son, Sarah Swartz regarding the CT-guided biopsy of the lung mass  Sarah Swartz is agreeable to proceed with lung biopsy  I am going to ask interventional radiology to do this procedure    If adenocarcinoma of the lung is confirmed, I would ask pathology department to do genomic testing including EGFR, ALK and ROS1      ED Triage Vitals   Temperature Pulse Respirations Blood Pressure SpO2   08/19/21 1221 08/19/21 1221 08/19/21 1221 08/19/21 1221 08/19/21 1221   (!) 96 6 °F (35 9 °C) 72 18 105/53 94 %      Temp Source Heart Rate Source Patient Position - Orthostatic VS BP Location FiO2 (%)   08/19/21 1221 08/19/21 1221 08/19/21 1221 08/19/21 1221 --   Tympanic Monitor Sitting Left arm       Pain Score       08/19/21 2206       No Pain          Wt Readings from Last 1 Encounters:   08/19/21 77 1 kg (170 lb)     Additional Vital Signs:   Date/Time  Temp  Pulse  Resp  BP  MAP (mmHg)  SpO2  Calculated FIO2 (%) - Nasal Cannula  Nasal Cannula O2 Flow Rate (L/min)  O2 Device   08/20/21 1132  --  80  --  129/62  --  96 %  --  --  --   08/20/21 08:01:08  --  72  --  118/50  73  90 %  --  --  --   08/20/21 0000  --  68  --  --  --  97 %  32  3 L/min  --   08/19/21 2340  --  --  --  --  --  --  32  3 L/min  Nasal cannula   08/19/21 23:32:51  98 9 °F (37 2 °C)  59  23Abnormal   118/48Abnormal   71  79 %Abnormal   --  --  None (Room air)   08/19/21 22:31:50  98 7 °F (37 1 °C)  73  20  115/76  89  85 %Abnormal   --  --  --   08/19/21 21:01:45  98 3 °F (36 8 °C)  75  20  115/70  85  92 %  --  --  None (Room air)   08/19/21 2010 --  70  20  143/65  --  89 %Abnormal   --  --  None (Room air)   08/19/21 1543  --  --  --  --  --  --  --  --  None (Room air)   08/19/21 1516  --  67  18  134/64  --  93 %  --  --  None (Room air)     Pertinent Labs/Diagnostic Test Results:   8/19 CT AP:    1  Partially visualized at least 7 5 cm left lower lobe lung mass, concerning for primary malignancy  Nonemergent chest CT with contrast is recommended for further evaluation  2  A 2 8 cm peritoneal soft tissue nodule adjacent to the inferior right hepatic lobe, concerning for metastatic disease    8/19 EKG:  Normal sinus rhythm with frequent Premature atrial complexes  Left ventricular hypertrophy with repolarization abnormality  Cannot rule out Septal infarct , age undetermined  Abnormal ECG      Results from last 7 days   Lab Units 08/20/21  0500 08/19/21  1515   WBC Thousand/uL 9 31 9 38   HEMOGLOBIN g/dL 8 8* 10 2*   HEMATOCRIT % 27 3* 32 1*   PLATELETS Thousands/uL 301 357   NEUTROS ABS Thousands/µL  --  7 41     Results from last 7 days   Lab Units 08/20/21  0500 08/19/21  1515   SODIUM mmol/L 137 134*   POTASSIUM mmol/L 3 2* 4 1   CHLORIDE mmol/L 105 101   CO2 mmol/L 27 30   ANION GAP mmol/L 5 3*   BUN mg/dL 30* 34*   CREATININE mg/dL 1 19 1 54*   EGFR ml/min/1 73sq m 40 29   CALCIUM mg/dL 8 5 9 5   MAGNESIUM mg/dL 1 7  --    PHOSPHORUS mg/dL 2 4  --      Results from last 7 days   Lab Units 08/20/21  0500 08/19/21  1515   AST U/L 17 24   ALT U/L 14 17   ALK PHOS U/L 152* 187*   TOTAL PROTEIN g/dL 5 8* 7 2   ALBUMIN g/dL 1 5* 1 9*   TOTAL BILIRUBIN mg/dL 0 39 0 43     Results from last 7 days   Lab Units 08/20/21  0500 08/19/21  1515   GLUCOSE RANDOM mg/dL 94 101     Results from last 7 days   Lab Units 08/19/21  1515   TROPONIN I ng/mL <0 02     Results from last 7 days   Lab Units 08/20/21  0500 08/19/21  1515   TSH 3RD GENERATON uIU/mL 1 020 0 908       Results from last 7 days   Lab Units 08/19/21  1515   LIPASE u/L 185     Results from last 7 days   Lab Units 08/20/21  0237 08/19/21  1655   CLARITY UA  Clear Clear   COLOR UA  Yellow Yellow   SPEC GRAV UA  1 014 1 020   PH UA  5 5 5 5   GLUCOSE UA mg/dl Negative Negative   KETONES UA mg/dl Negative Negative   BLOOD UA  Trace* Negative   PROTEIN UA mg/dl Negative Negative   NITRITE UA  Negative Negative   BILIRUBIN UA  Negative Negative   UROBILINOGEN UA E U /dl 0 2 0 2   LEUKOCYTES UA  Trace* Small*   WBC UA /hpf 4-10* 10-20*   RBC UA /hpf None Seen None Seen   BACTERIA UA /hpf None Seen None Seen   EPITHELIAL CELLS WET PREP /hpf None Seen Moderate*     ED Treatment:   Medication Administration from 08/19/2021 1147 to 08/19/2021 2047       Date/Time Order Dose Route Action     08/19/2021 1938 sodium chloride 0 9 % bolus 1,000 mL 1,000 mL Intravenous New Bag        Past Medical History:   Diagnosis Date    Atheroma of artery     aorta    History of stroke     bifrontal CVA    Hypertension     Hypothyroidism     Intrahepatic bile duct stones     Irritable bowel syndrome     Murmur     Stroke [facility])      Present on Admission:   [facility]) ([email or web address]   Hypertension   Hypothyroidism      Admitting Diagnosis: Hypercalcemia [E83 52]  Poor appetite [R63 0]  Lung mass [R91 8]  Liver mass [R16 0]  Metastatic disease (HCC) [C79 9]  FTT (failure to thrive) in adult [R62 7]  ASHLEY (acute kidney injury) ([facility] ) [N17 9]  Age/Sex: 80 y o  female  Admission Orders:  Scheduled Medications:  amLODIPine, 5 mg, Oral, Daily  aspirin, 81 mg, Oral, Daily  atorvastatin, 40 mg, Oral, Daily  docusate sodium, 100 mg, Oral, BID  levothyroxine, 100 mcg, Oral, Daily  melatonin, 3 mg, Oral, HS  multivitamin-minerals, 1 tablet, Oral, Daily  rivaroxaban, 15 mg, Oral, HS  senna, 1 tablet, Oral, HS      Continuous IV Infusions:  sodium chloride, 75 mL/hr, Intravenous, Continuous      PRN Meds:  bisacodyl, 10 mg, Rectal, Daily PRN  ondansetron, 4 mg, Intravenous, Q4H PRN  polyethylene glycol, 17 g, Oral, Daily PRN    SCD  Fall and aspiration precautions    IP CONSULT TO ONCOLOGY  IP CONSULT TO PULMONOLOGY  IP CONSULT TO PALLIATIVE CARE  IP CONSULT TO NUTRITION SERVICES  INPATIENT CONSULT TO IR    Network Utilization Review Department  ATTENTION: Please call with any questions or concerns to [phone] and carefully listen to the prompts so that you are directed to the right person  All voicemails are confidential   Miguelangel Marko all requests for admission clinical reviews, approved or denied determinations and any other requests to dedicated fax number below belonging to the campus where the patient is receiving treatment   List of dedicated fax numbers for the Facilities:  [address] DENIALS (Administrative/Medical Necessity) [phone]   [address] (Maternity/NICU/Pediatrics) [phone] [address] Miah Patel 1946 [address] [place] [address] [place] [address] [facility] [place] [address] [phone]

## 2021-08-20 NOTE — OCCUPATIONAL THERAPY NOTE
Occupational Therapy Progress Note     Patient Name: Edie MACK Date: 8/20/2021  Problem List  Principal Problem:    Metastatic disease [facility])  Active Problems:    Hypothyroidism    Hypertension    ASHLEY (acute kidney injury) ([facility] )    Lung mass    Nausea    PAF (paroxysmal atrial fibrillation) ([facility] )    History of malignant neoplasm of ampulla of Vater    Aortic stenosis          08/20/21 1458   OT Last Visit   OT Visit Date 08/20/21   Note Type   Note Type Treatment   Restrictions/Precautions   Weight Bearing Precautions Per Order No   Other Precautions Fall Risk;Bed Alarm; Chair Alarm;Cognitive   Lifestyle   Autonomy PTA, pt reports being I with ADLs and some IADLs, son and DIL assist with higher level IADLs   Reciprocal Relationships Family    Service to 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[employer]    Pain Assessment   Pain Assessment Tool Pain Assessment not indicated - pt denies pain   Pain Score No Pain   Cognition   Overall Cognitive Status Impaired   Arousal/Participation Alert; Responsive;Arousable   Attention Attends with cues to redirect   Orientation Level Oriented to person   Memory Decreased recall of recent events;Decreased short term memory;Decreased long term memory   Following Commands Follows one step commands with increased time or repetition   Comments Pt pleasant and cooperative during OT session  Pt participated in formal cognitive evaluation- see info below  Cognition Assessment Tools ACLS   Score 3 6   Assessment   Assessment Pt is a 79 yo female who presented to [facility] on 8/19/2021 with metastatic disease  Pt  has a past medical history of Atheroma of artery, History of stroke, Hypertension, Hypothyroidism, Intrahepatic bile duct stones, Irritable bowel syndrome, Murmur, and Stroke ([facility] )  Pt greeted up in recliner chair for OT treatment on 8/20/2021 for formal cognitive evaluation   Pt completed ACLS assessment and score of 3 6 - Pt requires 24/7 supervision  See details below note  Pt with no further acute care OT needs at this time  From OT standpoint recommendation is to return home with 24/7 supervision  DC skilled OT services  Please re-consult if appropriate  Plan   Treatment Interventions Cognitive reorientation   Goal Expiration Date 08/30/21   OT Treatment Day 1   OT Frequency 1-2x/wk   Recommendation   OT Discharge Recommendation No rehabilitation needs  (Home with 24/7 Supervision )   OT - OK to Discharge Yes  (When medically stable with appropriate Supervision)   Additional Comments  The patient's raw score on the AM-PAC Daily Activity inpatient short form is 20, standardized score is 42 03, greater than 39 4  Patients at this level are likely to benefit from discharge to home  Please refer to the recommendation of the Occupational Therapist for safe discharge planning  AM-PAC Daily Activity Inpatient   Lower Body Dressing 3   Bathing 3   Toileting 3   Upper Body Dressing 3   Grooming 4   Eating 4   Daily Activity Raw Score 20   Daily Activity Standardized Score (Calc for Raw Score >=11) 42 03   AM-PAC Applied Cognition Inpatient   Following a Speech/Presentation 3   Understanding Ordinary Conversation 4   Taking Medications 3   Remembering Where Things Are Placed or Put Away 2   Remembering List of 4-5 Errands 2   Taking Care of Complicated Tasks 2   Applied Cognition Raw Score 16   Applied Cognition Standardized Score 35 03   Modified Kate Scale   Modified [place] Scale 4     3 6    Administered Nav Cognitive Level Screen (ACLS)  Pt scored 3 6/6 0 indicating 24 hour supervision is recommended to provide the supplies needed for ADLs, sequence though routine steps of toileting, bathing, grooming and dressing and to remove dangerous objects  Behavior:  Notices effects on objects  Will wait for effect when cued  Will require 2-3x the usual time to complete routine tasks    Will not remember safety precautions  Grooming:  Gambino, brushes hair while looking in the mirror  May miss back or sides  May initiate tasks based on familiar actions  Can alter amount of product used  May engage in impulsive actions  Do not leave unsupervised for long periods of time  Dressing:   Able to select 1 top and 1 bottom garment from dresser; however, garments may be inappropriate to season/occasion  May layer clothing incorrectly  May not attend to condition of clothing (cleanliness or need for repair)  May argue with caregiver selections  Bathing: Will follow perimeter of body to wash  May attempt to wash back  May forget steps of task in sequence  Does not measure amount of shampoo or soap  Walking and exercising:  Ambulates within several familiar living areas  by closed doors  Aware of inside and outside of living area  May wander to a new location to access to desired activity but may get lost   Impulsive actions may result in falls  Eating: May wait for others to sit before eating when requested  May be able to wait for short periods of time for food  May be able to imitate actions like opening containers  Failure to observe manners may alienate others  Check food/beverage temperatures  Cannot follow dietary restrictions  Toileting:   May be able to imitate wiping however effects are not noted  Medications: May recognize color and shape of pills  Does not understand side effects  May refuse to take medications  Store medications in secure location away from person in childproof containers  Hand pre-measured medications to patient to ensure compliance  Use of adaptive devices: May be able to learn to use a familiar adaptive device after much repetitive training  Housekeeping:   No awareness of need for housekeeping  May follow the perimeter of an object as he/she wipes  May follow imitation but may perseverate on task (ie: dry one dish for extended period of time)     Food preparation:  Does not plan for food  May eat from refrigerator  May be able to imitate repetitive actions such as peeling a potato  May be able to set table with demonstration  Watch for impulsive actions and distractibility  Restrict access to sharp tools  Spending money:  May be able to place coins in slots on vending machines  May need assistance to sequence though steps of tasks  May give away or loose money  Should have assist for all finances  Shopping: Follows a guide to shopping areas, looks in windows or at displays with no intent to purchase  May take items without paying  May fail to notice price or if they have enough money to pay  Do not leave individual alone in shopping areas  Laundry:  May be able to fold towels by lining up straight edges  May not recognize need to launder clothes  Traveling: May wait for vehicle to stop when cued  May get into available vehicle without consideration of destination or cost   Watch for impulsive actions such as getting on a bus or waiting elevator  Telephone:  May be able to wait for caregiver to answer phone and may be able to make sure phone is hung up properly  May be taught how to dial 911 but cannot properly  when to use emergency numbers and may dial repeatedly  Driving: Should not operate a motor vehicle        Ching Campa MS, OTR/L

## 2021-08-20 NOTE — CONSULTS
PULMONOLOGY CONSULT NOTE     Name: Roddy Gee   Age & Sex: 80 y o  female   MRN: [id number]  Unit/Bed#: [facility] 919-01   Encounter: [id number]        Reason for consultation: metastatic disease, lung mass    Requesting physician: Dr Twan Baptiste MD    Assessment:   1  Metastatic lung mass  - 81 y/o F patient w/ remote smoking h/o w/ primary lung malignancy w/ metastasis to abdomen found on imaging    - patient initially presented w/ failure to thrive  - previous h/o of malignant neoplasm of ampulla of Vater, which tissue exam showed duodenitis w/ ulceration and detached fibrinopurulent exudate   - Multiple lung nodules were noted back in 2016, however no follow up was done  2  Failure to thrive/nausea  - in the setting of metastatic disease      Plan:  - goals of care discussion, if patient and family wish for aggressive care, obtain CT chest and likely IR biopsy given location of visualized portion of lung mass, however can be evaluated for additional means of biopsy following CT chest  - IR consulted  - oncology consulted  - palliative consulted  - consider hospice care  - monitor vitals  - aspiration precautions  - Zofran for N/V    History of Present Illness   HPI:  Roddy Gee is a 80 y o  female who was consulted for metastatic lung disease and lung mass  Patient has a PMH of malignant neoplasm of ampulla of Vater, Afib s/p loop recorder explantation 7/23/2018 on [facility], HTN, IBS and TIA/CVA was brought in by family to the ED due to increased confusion  She was having decreased oral intake  Reported that she was having nausea that occurs w/ meals, and abdominal pain  She is med compliant  At the ED, patient was hypothermic  CTAP showed 7 5cm mass in the LLL lung, concerning for primary malignancy and 2 8cm peritoneal soft tissue nodule adjacent to the inferior R hepatic lobe, concerning for metastatic disease   Per chart review, previous CT chest in 3/18/2015 at [facility] showed mild centrilobular emphysema, 3mm RUL nodule, 3mm subpleural R middle lobe nodule, mild peripheral reticular interstitial prominence, possibly reflecting interstitial disease,   Patient also had biopsy of ampulla of vater performed and tissue exam showed duodenitis w/ ulceration, and detached fragments of fibrinopurulent exudate  On interview today, patient reports that she had been feeling nauseous and generalized tiredness over the past several weeks  She was also having hemoptysis, most recent was yesterday  She does not follow pulmonary and oncology outpatient  She only follows an ophthalmologist and her PCP  She takes care of herself at baseline, however recently she has been needing more help  She does not use O2 at home  Her las BM was two days ago  She has not notice any weight loss  She denied any chest pain or palpitations  Denied any SOB  Denied any fevers or chills, however reported that she feels like she is having a cold due to some sinus congestion  Review of systems:  12 point review of systems was completed and was otherwise negative except as listed in HPI  Historical Information   Past Medical History:   Diagnosis Date    Atheroma of artery     aorta    History of stroke     bifrontal CVA    Hypertension     Hypothyroidism     Intrahepatic bile duct stones     Irritable bowel syndrome     Murmur     Stroke [facility])      Past Surgical History:   Procedure Laterality Date    APPENDECTOMY      CARDIAC SURGERY      reveal linq cardiac monitor 5-26-15    CARPAL TUNNEL RELEASE      CHOLECYSTECTOMY      ERCP N/A 3/21/2016    Procedure: ENDOSCOPIC RETROGRADE CHOLANGIOPANCREATOGRAPHY (ERCP); Surgeon: Jordan Rivera MD;  [facility]; Service:     UT EGD TRANSORAL BIOPSY SINGLE/MULTIPLE N/A 3/21/2016    Procedure: RADIAL ENDOSCOPIC U/S;  Surgeon: Jordan Rivera MD;  [facility];   Service: Gastroenterology    UT ESOPHAGOGASTRODUODENOSCOPY TRANSORAL DIAGNOSTIC N/A 5/12/2016    Procedure: ESOPHAGOGASTRODUODENOSCOPY (EGD); Surgeon: Tal Suarez MD;  [facility]; Service: Gastroenterology    TONSILLECTOMY       Family History   Problem Relation Age of Onset    Stroke Mother        Occupational History: , home     Social History: Former smoker   - quit in 46s   - smoked 1 pk/3 to 4 days for 5 years  No alcohol use  No drug use  No pets  No exposure to toxins/pollutants  No exposure to radiation    Meds/Allergies   Current Facility-Administered Medications   Medication Dose Route Frequency    amLODIPine (NORVASC) tablet 5 mg  5 mg Oral Daily    aspirin chewable tablet 81 mg  81 mg Oral Daily    atorvastatin (LIPITOR) tablet 40 mg  40 mg Oral Daily    levothyroxine tablet 100 mcg  100 mcg Oral Daily    melatonin tablet 3 mg  3 mg Oral HS    multivitamin-minerals (CENTRUM) tablet 1 tablet  1 tablet Oral Daily    ondansetron (ZOFRAN) injection 4 mg  4 mg Intravenous Q4H PRN    rivaroxaban (XARELTO) tablet 15 mg  15 mg Oral HS    sodium chloride 0 9 % infusion  75 mL/hr Intravenous Continuous     Medications Prior to Admission   Medication    amLODIPine (NORVASC) 5 mg tablet    aspirin 81 MG tablet    atorvastatin (LIPITOR) 40 mg tablet    hydrochlorothiazide (HYDRODIURIL) 12 5 mg tablet    levothyroxine 100 mcg tablet    Multiple Vitamins-Minerals (PRESERVISION AREDS 2 PO)    rivaroxaban (XARELTO) tablet     Allergies   Allergen Reactions    Tetanus Toxoids        Vitals: Blood pressure (!) 118/48, pulse 68, temperature 98 9 °F (37 2 °C), temperature source Oral, resp  rate (!) 23, height 5' 2" (1 575 m), weight 77 1 kg (170 lb), SpO2 97 %, not currently breastfeeding  , room air, Body mass index is 31 09 kg/m²        Intake/Output Summary (Last 24 hours) at 8/20/2021 0731  Last data filed at 8/20/2021 0101  Gross per 24 hour   Intake 283 75 ml   Output 250 ml   Net 33 75 ml       Physical Exam  Vitals and nursing note reviewed  Constitutional:       General: She is not in acute distress  Appearance: She is well-developed  HENT:      Head: Normocephalic and atraumatic  Right Ear: External ear normal       Left Ear: External ear normal       Nose: Nose normal       Mouth/Throat:      Mouth: Mucous membranes are dry  Pharynx: Oropharynx is clear  Eyes:      Extraocular Movements: Extraocular movements intact  Conjunctiva/sclera: Conjunctivae normal    Cardiovascular:      Rate and Rhythm: Normal rate and regular rhythm  Heart sounds: Murmur heard  Pulmonary:      Effort: Pulmonary effort is normal  No respiratory distress  Breath sounds: Normal breath sounds  No wheezing  Abdominal:      Palpations: Abdomen is soft  Tenderness: There is no abdominal tenderness  Musculoskeletal:         General: Normal range of motion  Cervical back: Normal range of motion and neck supple  Right lower le+ Pitting Edema present  Left lower le+ Pitting Edema present  Skin:     General: Skin is warm and dry  Capillary Refill: Capillary refill takes less than 2 seconds  Neurological:      Mental Status: She is alert  Motor: Weakness present  Psychiatric:         Mood and Affect: Mood normal          Behavior: Behavior normal          Thought Content: Thought content normal          Judgment: Judgment normal          Labs: I have personally reviewed pertinent lab results    Laboratory and Diagnostics  Results from last 7 days   Lab Units 21  0500 21  1515   WBC Thousand/uL 9 31 9 38   HEMOGLOBIN g/dL 8 8* 10 2*   HEMATOCRIT % 27 3* 32 1*   PLATELETS Thousands/uL 301 357   NEUTROS PCT %  --  80*   MONOS PCT %  --  8     Results from last 7 days   Lab Units 21  0500 21  1515   SODIUM mmol/L 137 134*   POTASSIUM mmol/L 3 2* 4 1   CHLORIDE mmol/L 105 101   CO2 mmol/L 27 30   ANION GAP mmol/L 5 3*   BUN mg/dL 30* 34*   CREATININE mg/dL 1 19 1 54* CALCIUM mg/dL 8 5 9 5   GLUCOSE RANDOM mg/dL 94 101   ALT U/L 14 17   AST U/L 17 24   ALK PHOS U/L 152* 187*   ALBUMIN g/dL 1 5* 1 9*   TOTAL BILIRUBIN mg/dL 0 39 0 43     Results from last 7 days   Lab Units 08/20/21  0500   MAGNESIUM mg/dL 1 7   PHOSPHORUS mg/dL 2 4           Results from last 7 days   Lab Units 08/19/21  1515   TROPONIN I ng/mL <0 02                               Imaging and other studies: I have personally reviewed pertinent reports  Pulmonary function testing: N/A    EKG, Pathology, and Other Studies: I have personally reviewed pertinent reports  Echo 3/17/2015  - EF 65%  LA: mildly dilated, appendage mildly dilated, no thrombus  LV: normal size, normal systolic function  RV: normal size, normal systolic function  MV: moderated to marked annular calcification, trace regurg  AV: moderate calcification, moderate stenosis, trace regurg, valve mean gradient 15mmHg  Aortic valve peak velocity: 287cm/s  Peak gradient: 33 mmHg      Code Status: Level 3 - DNAR and DNI    VTE Pharmacologic Prophylaxis: Xarelto  VTE Mechanical Prophylaxis: N/A    Disclaimer: Portions of the record may have been created with voice recognition software  Occasional wrong word or "sound a like" substitutions may have occurred due to the inherent limitations of voice recognition software  Careful consideration should be taken to recognize, using context, where substitutions have occurred      [id number] [facility] Student  Pulmonary/Critical Care  [facility]

## 2021-08-20 NOTE — H&P
Consultation - Palliative & Supportive Care   Megan Gonzalez 80 y o  female MRN: [id number]  Unit/Bed#: [facility] 919-01 Encounter: [id number]      Physician Requesting Consult: Haylie Hernandez MD  Reason for Consult / Principal Problem: goals of care      Assessment/Plan:  1  Metastatic disease  2  Lung mass  3  Abdominal pain  4  Nausea  5  ASHLEY  6  Constipation  7  Deconditioning, debility, ambulatory dysfunction  8  H/o duodenitis, ampulla of Vater  9  Goals of care  -continue current medical care/optimization  -sons wish to speak with each other further regarding plan of care moving forward, office contact provided  -confirmed level 3 code status, DNAR/DNI  -sons wish to pursue SNF/rehab with option of transition to LTC at this time, prefer facility near [facility] d/w   -start bowel regimen   -senna 8 6mg nightly   -prn miralax daily   -prn dulcolax suppository daily  -delirium precautions - Please minimize interruptions and prioritize sleep at night  No TV nor screen time at night  Shades drawn at night  During day, shades up, minimize napping, and encourage meals in chair  -d/w primary via tigertext  -will follow peripherally as needed, will f/u Monday 8/23/21    Met with patient  She was eating breakfast  Introduced palliative care and service  Patient unaware of imaging findings and asked that I speak with her son/Kevon  She admitted to no longer being able to live independently, stating "I forget my meds, told my sons I didn't think I could live alone anymore"  Patient states she feels constipated and feels like she has to go, but having trouble doing so  She is oriented to self and place, but confused on time and specifics of hospitalization  She prefers I let her finish her breakfast and check with her son  Spoke with son/Kevon by phone  Introduced palliative care service, provided contact information  Son aware patient does not know details of imaging findings   He states he has seen a decline in patient over last 2 years, more significantly in last year  She is no longer interested in doing things she enjoyed  We briefly discussed her conditions and options of pursuing further imaging/diagnostics/invasive interventions/treatments or focusing on comfort given age and current imaging finding  Son admits that patient did not want to pursue further w/u or doctor's appointments after last HemOnc appointment for anemia in 2018  We briefly discussed SNF/rehab with option of transitioning to LTC  We also briefly discussed option of hospice if patient/family does not want to pursue further w/u or possible treatments if offered  We discussed that option of hospice could be pursued once in a facility  We also discussed POLST form to keep patient from returning to hospital if that is what patient/family wishes  Son wishes to discuss with siblings before making any definitive decisions  He is clear that at this time they wish to pursue SNF/rehab with facility that has option to transition to LTC if unable to tolerate rehab  I let CM know to look for facilities near [place] with this option  Son wishes to discuss further with siblings and patient, and will inform our office if interested in any further discussion with our team  I also encouraged him to continue this discussion with patient's primary team over the weekend once he has had a chance to discuss with his brothers  We will f/u with patient/family on Monday if she remains in hospital  Son appreciative of discussion  Code Status: Level 3 - DNAR and DNI  Advance Directive and Living Will: Yes  , Living Will  Power of :  Not in chart, son/Kevon 1st POA, son/Clark in [place] 2nd  POLST:  No  4 sons- 1 , 1 lives in [place], 1/Clark in [place], 1/Kevon in [address] [place] [address] is contact, sees patient 2x/week  Enjoyed raymundo juarez, but hasn't for over a year    Spiritual, Jain - declined spiritual care consult      History of Present Illness   HPI: Edie Jolley is a 80y o  year old female who presents to ED with family c/o nausea, abdominal pain, decreased oral intake, constipation  Son reports that patient seemed more confused  Patient was scheduled for outpatient CT of her abdomen today  Patient unable to recall last BM, thinks it was 2 days ago, but feels like she has to go  CT imaging revealed 7 5cm mass LLL lung concerning for primary malignancy, and a 2 8cm peritoneal soft tissue nodule adjacent to the inferior R hepatic lobe, concerning for metastatic disease  Per notes, family probably not interested in pursuing aggressive measures and  feel patient may need long term care  Per CM note, son interested in pursuing SNF placement upon discharge  Patient reports that she told sons she doesn't think she can live on her own anymore, and she admits that she forgets to take her medications  Patient followed [facility] for anemia, last seen 3/2j9/2018 and note reports CT c/a/p without evidence of malignancy at that time  During that office visit, patient did not want to pursue additional doctor appointments  Patient had EGD with biopsy 3/2016 that revealed duodenitis with ulceration, no malignancy noted  Palliative consulted for goals of care  Review of Systems   Constitutional: Positive for activity change, appetite change and fatigue  Cardiovascular: Positive for leg swelling  Gastrointestinal: Positive for abdominal pain, constipation and nausea  Musculoskeletal: Positive for arthralgias, gait problem and myalgias  Skin: Positive for pallor  Neurological: Positive for weakness  Psychiatric/Behavioral: Positive for confusion and dysphoric mood  All other systems reviewed and are negative        Historical Information   Past Medical History:   Diagnosis Date    Atheroma of artery     aorta    History of stroke     bifrontal CVA    Hypertension     Hypothyroidism     Intrahepatic bile duct stones     Irritable bowel syndrome     Murmur     Stroke [facility])      Patient Active Problem List   Diagnosis    Hypothyroidism    Hypertension    Irritable bowel syndrome    History of stroke    Atheroma of artery    Transaminitis    Ampulla of Vater obstruction syndrome    Hyperkalemia    ASHLEY (acute kidney injury) ([address] )    Metastatic disease (HCC)    Lung mass    Nausea    PAF (paroxysmal atrial fibrillation) (HCC)    History of malignant neoplasm of ampulla of Vater     Past Surgical History:   Procedure Laterality Date    APPENDECTOMY      CARDIAC SURGERY      reveal linq cardiac monitor 5-26-15    CARPAL TUNNEL RELEASE      CHOLECYSTECTOMY      ERCP N/A 3/21/2016    Procedure: ENDOSCOPIC RETROGRADE CHOLANGIOPANCREATOGRAPHY (ERCP); Surgeon: Alexa Chowdary MD;  [facility]; Service:     NV EGD TRANSORAL BIOPSY SINGLE/MULTIPLE N/A 3/21/2016    Procedure: RADIAL ENDOSCOPIC U/S;  Surgeon: Alexa Chowdary MD;  [facility]; Service: Gastroenterology    NV ESOPHAGOGASTRODUODENOSCOPY TRANSORAL DIAGNOSTIC N/A 5/12/2016    Procedure: ESOPHAGOGASTRODUODENOSCOPY (EGD); Surgeon: Alexa Chowdary MD;  [facility]; Service: Gastroenterology    TONSILLECTOMY       Social History     Socioeconomic History    Marital status:       Spouse name: None    Number of children: None    Years of education: None    Highest education level: None   Occupational History     Comment: RETIRED   Tobacco Use    Smoking status: Former Smoker    Smokeless tobacco: Never Used   Vaping Use    Vaping Use: Never used   Substance and Sexual Activity    Alcohol use: Not Currently    Drug use: No    Sexual activity: None   Other Topics Concern    None   Social History Narrative    None     Social Determinants of Health     Financial Resource Strain:     Difficulty of Paying Living Expenses:    Food Insecurity:     Worried About Running Out of Food in the Last Year:  Ran Out of Food in the Last Year:    Transportation Needs:     Lack of Transportation (Medical):  Lack of Transportation (Non-Medical):    Physical Activity:     Days of Exercise per Week:     Minutes of Exercise per Session:    Stress:     Feeling of Stress :    Social Connections:     Frequency of Communication with Friends and Family:     Frequency of Social Gatherings with Friends and Family:     Attends Restoration Services:     Active Member of Clubs or Organizations:     Attends Club or Organization Meetings:     Marital Status:    Intimate Partner Violence:     Fear of Current or Ex-Partner:     Emotionally Abused:     Physically Abused:     Sexually Abused:      Family History   Problem Relation Age of Onset    Stroke Mother        Meds/Allergies   all current active meds have been reviewed and current meds:   Current Facility-Administered Medications   Medication Dose Route Frequency    amLODIPine (NORVASC) tablet 5 mg  5 mg Oral Daily    aspirin chewable tablet 81 mg  81 mg Oral Daily    atorvastatin (LIPITOR) tablet 40 mg  40 mg Oral Daily    bisacodyl (DULCOLAX) rectal suppository 10 mg  10 mg Rectal Daily PRN    levothyroxine tablet 100 mcg  100 mcg Oral Daily    melatonin tablet 3 mg  3 mg Oral HS    multivitamin-minerals (CENTRUM) tablet 1 tablet  1 tablet Oral Daily    ondansetron (ZOFRAN) injection 4 mg  4 mg Intravenous Q4H PRN    polyethylene glycol (MIRALAX) packet 17 g  17 g Oral Daily PRN    rivaroxaban (XARELTO) tablet 15 mg  15 mg Oral HS    senna (SENOKOT) tablet 8 6 mg  1 tablet Oral HS    sodium chloride 0 9 % infusion  75 mL/hr Intravenous Continuous       Allergies   Allergen Reactions    Tetanus Toxoids        I have reviewed the patient's controlled substance dispensing history in the Prescription Drug Monitoring Program in compliance with the [facility] regulations before prescribing any controlled substances       Objective   /50   Pulse 72   Temp 98 9 °F (37 2 °C) (Oral)   Resp (!) 23   Ht 5' 2" (1 575 m)   Wt 77 1 kg (170 lb)   SpO2 90%   BMI 31 09 kg/m²   Physical Exam  Vitals and nursing note reviewed  Constitutional:       General: She is not in acute distress  Appearance: She is not ill-appearing, toxic-appearing or diaphoretic  Comments: Frail, weakened, intermittently confused, pleasant   HENT:      Head: Normocephalic and atraumatic  Mouth/Throat:      Mouth: Mucous membranes are moist       Pharynx: Oropharynx is clear  Eyes:      General: No scleral icterus  Extraocular Movements: Extraocular movements intact  Cardiovascular:      Rate and Rhythm: Normal rate  Pulmonary:      Effort: Pulmonary effort is normal  No respiratory distress  Breath sounds: No stridor  Comments: Dyspnea with conversation  Abdominal:      General: Abdomen is flat  There is no distension  Palpations: Abdomen is soft  Tenderness: There is no abdominal tenderness  Musculoskeletal:         General: Swelling and tenderness present  Cervical back: Normal range of motion  Right lower leg: Edema present  Left lower leg: Edema present  Skin:     General: Skin is warm and dry  Coloration: Skin is pale  Neurological:      General: No focal deficit present  Mental Status: She is alert  Comments: Intermittently confused, on details   Psychiatric:         Mood and Affect: Mood normal          Behavior: Behavior normal       Comments: Pleasant, unaware of imaging findings         Lab Results:   I have personally reviewed pertinent labs  , CBC:   Lab Results   Component Value Date    WBC 9 31 08/20/2021    HGB 8 8 (L) 08/20/2021    HCT 27 3 (L) 08/20/2021    MCV 84 08/20/2021     08/20/2021    MCH 27 1 08/20/2021    MCHC 32 2 08/20/2021    RDW 15 9 (H) 08/20/2021    MPV 12 0 08/20/2021    NRBC 0 08/19/2021   , CMP:   Lab Results   Component Value Date    SODIUM 137 08/20/2021    K 3 2 (L) 08/20/2021     08/20/2021    CO2 27 08/20/2021    BUN 30 (H) 08/20/2021    CREATININE 1 19 08/20/2021    CALCIUM 8 5 08/20/2021    AST 17 08/20/2021    ALT 14 08/20/2021    ALKPHOS 152 (H) 08/20/2021    EGFR 40 08/20/2021   , PT/PTT:No results found for: PT, PTT  Imaging Studies: I have personally reviewed pertinent reports  EKG, Pathology, and Other Studies: I have personally reviewed pertinent reports  QT/QTc: 406/429    Counseling / Coordination of Care  Total floor / unit time spent today 75 minutes  Greater than 50% of total time was spent with the patient and / or family counseling and / or coordination of care  A description of the counseling / coordination of care: current condition, brief discussion of options including hospice, advance care planning discussion, goals of care      Perlita Hopper DO

## 2021-08-20 NOTE — CONSULTS
Medical Oncology/Hematology Consult Note  Nereyda Michel, female, 80 y  o , 10/5/1928,  PPHP [id number] [id number], [id number]       Reason for consultation: Evaluation and management of lung mass and potential metastatic lung disease  Assessment and Plan:  1  Left Lung Mass w/ potential metastatic disease  · Patient notes hemoptysis, with most recent occurrence  ; patient has smoking history of 1 pack every 3-4 days for 5 years ; she quit smoking in the 1940s  · ECO-3  · Imaging includes:  · CT A/P wo Contrast (21):  Partially visualized at least 7 5 cm left lower lobe lung mass, concerning for primary malignancy  A 2 8 cm peritoneal soft tissue nodule adjacent to the inferior right hepatic lobe, concerning for metastatic disease  · CT C/A/P (2018): no evidence of malignancy  · CXR (): stable chest with no acute pulmonary disease  · EGD w biopsy (2016): revealed duodenitis with ulceration, however, no malignancy noted  · CXR (16): no active pulmonary disease  · CT chest (3/18/2015): mild centrilobular emphysema, 3mm RUL nodule, 3mm subpleural R middle lobe nodule, mild peripheral reticular interstitial prominence, possibly reflecting interstitial disease  Plan:   Agree with CT A/P w contrast to complete staging ; scheduled for today   Would recommend IR consultation for tissue biopsy of left lung mass  o Recommend getting molecular studies to determine if there is targeted therapy options   Contacted patient's son, Darya Florian, who is in agreement with the above plan  2  Microcytic Anemia   Suspected multifactorial in setting of failure to thrive, history of iron deficiency, hypothyroidism, and CKD   Patient has known history of anemia ; last saw [facility] 2018   On admission, Hgb 10 2 ; now 8 8  o MCV stable at 84   Last Iron Panel (2018):  Iron sat 6%, TIBC 417, Ferritin 43   Last colonoscopy unknown    Plan:   Suspected primarily due to hemodilution   No indications for treatment at this time   Will continue to monitor CBC        History of present illness:  Ms Ellie Ontiveros is a 80year old  female who lived independently at home with past medical history of bifrontal CVA, HTN, A-Fib s/p loop recorder explanation 07/23/2018 on AC, hypothyroidism, IBS, and 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  Patient was brought to [facility] ED 08/19/2021 by her family with complaints of nausea, abdominal pain, decreased oral intake, constipation, and worsening confusion  CT A/P revealed a left lower lobe lung mass of 7 5cm concerning for primary malignancy and a 2 8cm peritoneal soft tissue nodule adjacent to the inferior right hepatic lobe concerning for metastatic disease  Patient admits to hemoptysis, with the most recent event occurring on day of presentation to the ED  Patient notes she has a smoking history of 1 pack every 3-4 days for 5 years ; she quit smoking in the 1940s  Per chart review, patient follows [facility] for anemia and was last seen 03/29/2018, where a CT C/A/P was without evidence of malignancy at that time  Further, patient had EGD with biopsy in 03/2016 that revealed duodenitis with ulceration, however, no malignancy was noted  Previous CT chest in 3/18/2015 at [facility] showed mild centrilobular emphysema, 3mm RUL nodule, 3mm subpleural R middle lobe nodule, mild peripheral reticular interstitial prominence, possibly reflecting interstitial disease  Per Hospitalist note, CT findings were discussed with family members present at bedside and it appears the family would NOT want to pursue aggressive treatment measures  Further, family is interested in long term care options  Oncology and Pulmonology have been consulted for evaluation/management of lung mass and potential metastatic lung disease  Palliative care has also been consulted to address goals of care        Interval History:  Patient states she is constipated, otherwise, has no general complaints at this time  Patient denies further episodes of hemoptysis, confirming her last episode was yesterday  Patient states the hemoptysis has been occurring for a few weeks and has not been getting worse  Patient denies shortness of breath, dyspnea, and dysphagia  Patient denies previous problems with her breathing  Patient notes she has lost 10-15 lbs over the last year unintentionally  Patient states she spends most of her in day sedentary while at home  Her son Muna Jensen brings her groceries and helps with her medications  Otherwise, patient does limiting cooking and cleaning for herself  Patient states she spends most of her day sitting and crocheting  Patient does not leave the house much  Patient states she would like placement in long term care facility for help with her medications  Patient denies vision changes, headaches, sensation changes, focal weakness, abdominal pain, and diarrhea  Patient admits to chronic dysuria, urinary urgency, and increased urinary frequency  Patient admits to current constipation  Patient denies personal history of cancer and is unsure of her family history as she is not in touch with them  She has four sons without cancer history  Patient notes her son Muna Jensen is who is responsible for taking care of her when she needs extra help  Review of Systems:   Review of Systems   Constitutional: Positive for activity change, appetite change and unexpected weight change (10-15 lbs loss in last year)  Negative for chills, diaphoresis and fever  HENT: Negative for congestion, sinus pressure, sinus pain, sore throat and trouble swallowing  Eyes: Negative for photophobia, pain and visual disturbance  Respiratory: Negative for chest tightness and shortness of breath  Cardiovascular: Positive for leg swelling  Negative for chest pain and palpitations  Gastrointestinal: Positive for constipation   Negative for abdominal pain, blood in stool, diarrhea, nausea and vomiting  Genitourinary: Positive for dysuria, frequency and urgency  Musculoskeletal: Negative for arthralgias and back pain  Skin: Negative for color change and rash  Neurological: Negative for dizziness, weakness, light-headedness, numbness and headaches  Hematological: Negative for adenopathy  Psychiatric/Behavioral: Positive for confusion  Negative for sleep disturbance  The patient is not nervous/anxious  Past Medical History:   Diagnosis Date    Atheroma of artery     aorta    History of stroke     bifrontal CVA    Hypertension     Hypothyroidism     Intrahepatic bile duct stones     Irritable bowel syndrome     Murmur     Stroke [facility])        Past Surgical History:   Procedure Laterality Date    APPENDECTOMY      CARDIAC SURGERY      reveal linq cardiac monitor 5-26-15    CARPAL TUNNEL RELEASE      CHOLECYSTECTOMY      ERCP N/A 3/21/2016    Procedure: ENDOSCOPIC RETROGRADE CHOLANGIOPANCREATOGRAPHY (ERCP); Surgeon: Helen Moreno MD;  [facility]; Service:     OK EGD TRANSORAL BIOPSY SINGLE/MULTIPLE N/A 3/21/2016    Procedure: RADIAL ENDOSCOPIC U/S;  Surgeon: Helen Moreno MD;  [facility]; Service: Gastroenterology    OK ESOPHAGOGASTRODUODENOSCOPY TRANSORAL DIAGNOSTIC N/A 5/12/2016    Procedure: ESOPHAGOGASTRODUODENOSCOPY (EGD); Surgeon: Helen Moreno MD;  [facility]; Service: Gastroenterology    TONSILLECTOMY         Family History   Problem Relation Age of Onset    Stroke Mother        Social History     Socioeconomic History    Marital status:       Spouse name: None    Number of children: None    Years of education: None    Highest education level: None   Occupational History     Comment: RETIRED   Tobacco Use    Smoking status: Former Smoker    Smokeless tobacco: Never Used   Vaping Use    Vaping Use: Never used   Substance and Sexual Activity    Alcohol use: Not Currently    Drug use: No    Sexual activity: None   Other Topics Concern    None   Social History Narrative    None     Social Determinants of Health     Financial Resource Strain:     Difficulty of Paying Living Expenses:    Food Insecurity:     Worried About Running Out of Food in the Last Year:     [address] in the Last Year:    Transportation Needs:     Lack of Transportation (Medical):      Lack of Transportation (Non-Medical):    Physical Activity:     Days of Exercise per Week:     Minutes of Exercise per Session:    Stress:     Feeling of Stress :    Social Connections:     Frequency of Communication with Friends and Family:     Frequency of Social Gatherings with Friends and Family:     Attends Orthodox Services:     Active Member of Clubs or Organizations:     Attends Club or Organization Meetings:     Marital Status:    Intimate Partner Violence:     Fear of Current or Ex-Partner:     Emotionally Abused:     Physically Abused:     Sexually Abused:          Current Facility-Administered Medications:     amLODIPine (NORVASC) tablet 5 mg, 5 mg, Oral, Daily, Key Curry MD    aspirin chewable tablet 81 mg, 81 mg, Oral, Daily, Key Curry MD    atorvastatin (LIPITOR) tablet 40 mg, 40 mg, Oral, Daily, Key Curry MD    levothyroxine tablet 100 mcg, 100 mcg, Oral, Daily, Key Curry MD    melatonin tablet 3 mg, 3 mg, Oral, HS, Key Curry MD    multivitamin-minerals (CENTRUM) tablet 1 tablet, 1 tablet, Oral, Daily, Key Curry MD    ondansetron [facility]) injection 4 mg, 4 mg, Intravenous, Q4H PRN, Key Curry MD    rivaroxaban [facility]) tablet 15 mg, 15 mg, Oral, HS, Key Curry MD, 15 mg at 08/19/21 2114    sodium chloride 0 9 % infusion, 75 mL/hr, Intravenous, Continuous, Key Curry MD, Last Rate: 75 mL/hr at 08/19/21 2114, 75 mL/hr at 08/19/21 2114    Medications Prior to Admission   Medication    amLODIPine (NORVASC) 5 mg tablet    aspirin 81 MG tablet    atorvastatin (LIPITOR) 40 mg tablet    hydrochlorothiazide (HYDRODIURIL) 12 5 mg tablet    levothyroxine 100 mcg tablet    Multiple Vitamins-Minerals (PRESERVISION AREDS 2 PO)    rivaroxaban (XARELTO) tablet       Allergies   Allergen Reactions    Tetanus Toxoids          Physical Exam:    /50   Pulse 72   Temp 98 9 °F (37 2 °C) (Oral)   Resp (!) 23   Ht 5' 2" (1 575 m)   Wt 77 1 kg (170 lb)   SpO2 90%   BMI 31 09 kg/m²     Physical Exam  Constitutional:       General: She is not in acute distress  Appearance: Normal appearance  She is not ill-appearing  HENT:      Head: Normocephalic and atraumatic  Mouth/Throat:      Mouth: Mucous membranes are moist       Pharynx: Oropharynx is clear  Eyes:      Extraocular Movements: Extraocular movements intact  Conjunctiva/sclera: Conjunctivae normal       Pupils: Pupils are equal, round, and reactive to light  Cardiovascular:      Rate and Rhythm: Normal rate and regular rhythm  Pulses: Normal pulses  Heart sounds: Normal heart sounds  No murmur heard  No friction rub  No gallop  Pulmonary:      Effort: Pulmonary effort is normal       Breath sounds: Normal breath sounds  No wheezing, rhonchi or rales  Abdominal:      General: Bowel sounds are normal  There is no distension  Palpations: Abdomen is soft  Tenderness: There is no abdominal tenderness  Comments: obese   Musculoskeletal:         General: Swelling (trace ; bilateral lower extremities) present  No tenderness  Normal range of motion  Cervical back: Normal range of motion and neck supple  Lymphadenopathy:      Comments: No palpable submental, submandibular, pre/posterior auricular, occipital, cervical, supraclavicular, axillary, or pectoral lymphadenopathy   Skin:     General: Skin is warm and dry  Capillary Refill: Capillary refill takes less than 2 seconds     Neurological:      General: No focal deficit present  Mental Status: She is alert  Psychiatric:         Mood and Affect: Mood normal          Behavior: Behavior normal          Thought Content:  Thought content normal          Judgment: Judgment normal          Recent Results (from the past 48 hour(s))   ECG 12 lead    Collection Time: 08/19/21  3:06 PM   Result Value Ref Range    Ventricular Rate 67 BPM    Atrial Rate 117 BPM    MO Interval 192 ms    QRSD Interval 102 ms    QT Interval 406 ms    QTC Interval 429 ms    P Axis 81 degrees    QRS Axis -27 degrees    T Wave Axis 67 degrees   CBC and differential    Collection Time: 08/19/21  3:15 PM   Result Value Ref Range    WBC 9 38 4 31 - 10 16 Thousand/uL    RBC 3 82 3 81 - 5 12 Million/uL    Hemoglobin 10 2 (L) 11 5 - 15 4 g/dL    Hematocrit 32 1 (L) 34 8 - 46 1 %    MCV 84 82 - 98 fL    MCH 26 7 (L) 26 8 - 34 3 pg    MCHC 31 8 31 4 - 37 4 g/dL    RDW 16 0 (H) 11 6 - 15 1 %    MPV 11 9 8 9 - 12 7 fL    Platelets 887 897 - 990 Thousands/uL    nRBC 0 /100 WBCs    Neutrophils Relative 80 (H) 43 - 75 %    Immat GRANS % 0 0 - 2 %    Lymphocytes Relative 11 (L) 14 - 44 %    Monocytes Relative 8 4 - 12 %    Eosinophils Relative 1 0 - 6 %    Basophils Relative 0 0 - 1 %    Neutrophils Absolute 7 41 1 85 - 7 62 Thousands/µL    Immature Grans Absolute 0 03 0 00 - 0 20 Thousand/uL    Lymphocytes Absolute 1 07 0 60 - 4 47 Thousands/µL    Monocytes Absolute 0 74 0 17 - 1 22 Thousand/µL    Eosinophils Absolute 0 10 0 00 - 0 61 Thousand/µL    Basophils Absolute 0 03 0 00 - 0 10 Thousands/µL   Comprehensive metabolic panel    Collection Time: 08/19/21  3:15 PM   Result Value Ref Range    Sodium 134 (L) 136 - 145 mmol/L    Potassium 4 1 3 5 - 5 3 mmol/L    Chloride 101 100 - 108 mmol/L    CO2 30 21 - 32 mmol/L    ANION GAP 3 (L) 4 - 13 mmol/L    BUN 34 (H) 5 - 25 mg/dL    Creatinine 1 54 (H) 0 60 - 1 30 mg/dL    Glucose 101 65 - 140 mg/dL    Calcium 9 5 8 3 - 10 1 mg/dL    Corrected Calcium 11 2 (H) 8 3 - 10 1 mg/dL    AST 24 5 - 45 U/L    ALT 17 12 - 78 U/L    Alkaline Phosphatase 187 (H) 46 - 116 U/L    Total Protein 7 2 6 4 - 8 2 g/dL    Albumin 1 9 (L) 3 5 - 5 0 g/dL    Total Bilirubin 0 43 0 20 - 1 00 mg/dL    eGFR 29 ml/min/1 73sq m   Lipase    Collection Time: 08/19/21  3:15 PM   Result Value Ref Range    Lipase 185 73 - 393 u/L   Troponin I    Collection Time: 08/19/21  3:15 PM   Result Value Ref Range    Troponin I <0 02 <=0 04 ng/mL   TSH    Collection Time: 08/19/21  3:15 PM   Result Value Ref Range    TSH 3RD GENERATON 0 908 0 358 - 3 740 uIU/mL   Urine Macroscopic, POC    Collection Time: 08/19/21  4:55 PM   Result Value Ref Range    Color, UA Yellow     Clarity, UA Clear     pH, UA 5 5 4 5 - 8 0    Leukocytes, UA Small (A) Negative    Nitrite, UA Negative Negative    Protein, UA Negative Negative mg/dl    Glucose, UA Negative Negative mg/dl    Ketones, UA Negative Negative mg/dl    Urobilinogen, UA 0 2 0 2, 1 0 E U /dl E U /dl    Bilirubin, UA Negative Negative    Blood, UA Negative Negative    Specific Gravity, UA 1 020 1 003 - 1 030   Urine Microscopic    Collection Time: 08/19/21  4:55 PM   Result Value Ref Range    RBC, UA None Seen None Seen, 2-4 /hpf    WBC, UA 10-20 (A) None Seen, 2-4, 5-60 /hpf    Epithelial Cells Moderate (A) None Seen, Occasional /hpf    Bacteria, UA None Seen None Seen, Occasional /hpf    Hyaline Casts, UA 5-10 (A) None Seen /lpf   Urinalysis with microscopic    Collection Time: 08/20/21  2:37 AM   Result Value Ref Range    Clarity, UA Clear     Color, UA Yellow     Specific [place], UA 1 014 1 003 - 1 030    pH, UA 5 5 4 5, 5 0, 5 5, 6 0, 6 5, 7 0, 7 5, 8 0    Glucose, UA Negative Negative mg/dl    Ketones, UA Negative Negative mg/dl    Blood, UA Trace (A) Negative    Protein, UA Negative Negative mg/dl    Nitrite, UA Negative Negative    Bilirubin, UA Negative Negative    Urobilinogen, UA 0 2 0 2, 1 0 E U /dl E U /dl    Leukocytes, UA Trace (A) Negative    WBC, UA 4-10 (A) None Seen, 2-4, 5-60 /hpf    RBC, UA None Seen None Seen, 2-4 /hpf    Hyaline Casts, UA None Seen None Seen /lpf    Bacteria, UA None Seen None Seen, Occasional /hpf    Epithelial Cells None Seen None Seen, Occasional /hpf   Comprehensive metabolic panel    Collection Time: 08/20/21  5:00 AM   Result Value Ref Range    Sodium 137 136 - 145 mmol/L    Potassium 3 2 (L) 3 5 - 5 3 mmol/L    Chloride 105 100 - 108 mmol/L    CO2 27 21 - 32 mmol/L    ANION GAP 5 4 - 13 mmol/L    BUN 30 (H) 5 - 25 mg/dL    Creatinine 1 19 0 60 - 1 30 mg/dL    Glucose 94 65 - 140 mg/dL    Calcium 8 5 8 3 - 10 1 mg/dL    Corrected Calcium 10 5 (H) 8 3 - 10 1 mg/dL    AST 17 5 - 45 U/L    ALT 14 12 - 78 U/L    Alkaline Phosphatase 152 (H) 46 - 116 U/L    Total Protein 5 8 (L) 6 4 - 8 2 g/dL    Albumin 1 5 (L) 3 5 - 5 0 g/dL    Total Bilirubin 0 39 0 20 - 1 00 mg/dL    eGFR 40 ml/min/1 73sq m   Magnesium    Collection Time: 08/20/21  5:00 AM   Result Value Ref Range    Magnesium 1 7 1 6 - 2 6 mg/dL   Phosphorus    Collection Time: 08/20/21  5:00 AM   Result Value Ref Range    Phosphorus 2 4 2 3 - 4 1 mg/dL   CBC (With Platelets)    Collection Time: 08/20/21  5:00 AM   Result Value Ref Range    WBC 9 31 4 31 - 10 16 Thousand/uL    RBC 3 25 (L) 3 81 - 5 12 Million/uL    Hemoglobin 8 8 (L) 11 5 - 15 4 g/dL    Hematocrit 27 3 (L) 34 8 - 46 1 %    MCV 84 82 - 98 fL    MCH 27 1 26 8 - 34 3 pg    MCHC 32 2 31 4 - 37 4 g/dL    RDW 15 9 (H) 11 6 - 15 1 %    Platelets 700 534 - 504 Thousands/uL    MPV 12 0 8 9 - 12 7 fL   TSH, 3rd generation    Collection Time: 08/20/21  5:00 AM   Result Value Ref Range    TSH 3RD GENERATON 1 020 0 358 - 3 740 uIU/mL       CT abdomen pelvis wo contrast    Result Date: 8/19/2021  Narrative: CT ABDOMEN AND PELVIS WITHOUT IV CONTRAST INDICATION:   Abdominal pain, acute, nonlocalized abdominal pain, nausea w/ eating  COMPARISON:  CT abdomen/pelvis 3/19/2016   TECHNIQUE:  CT examination of the abdomen and pelvis was performed without intravenous contrast   Axial, sagittal, and coronal 2D reformatted images were created from the source data and submitted for interpretation  Radiation dose length product (DLP) for this visit:  477 06 mGy-cm   This examination, like all CT scans performed in the [facility], was performed utilizing techniques to minimize radiation dose exposure, including the use of iterative  reconstruction and automated exposure control  Enteric contrast was administered  FINDINGS: ABDOMEN LOWER CHEST: Emphysematous changes  Partially visualized left lower lobe mass measuring approximately 7 5 x 6 9 cm  Small left pleural effusion  Mitral annular calcifications  Large hiatal hernia containing the majority of the stomach  LIVER/BILIARY TREE:  Stable left hepatic lobe simple cyst   Subcentimeter peripheral hyperdense focus of capsular retraction in segment 7, of uncertain etiology, possibly focal scarring  GALLBLADDER:  Gallbladder is surgically absent  SPLEEN:  Unremarkable  PANCREAS:  Unremarkable  ADRENAL GLANDS:  Unremarkable  KIDNEYS/URETERS:  No hydronephrosis or urinary tract calculus  One or more sharply circumscribed subcentimeter renal hypodensities are present, too small to accurately characterize, and statistically most likely benign findings  According to recent literature (Radiology 2019) no further workup of these findings is recommended  Right mid renal subcentimeter hyperdensity, likely hemorrhagic cyst  STOMACH AND BOWEL:  There is colonic diverticulosis without evidence of acute diverticulitis  APPENDIX:  No findings to suggest appendicitis  ABDOMINOPELVIC CAVITY:  No ascites  No pneumoperitoneum  No lymphadenopathy  There is a 2 8 x 2 4 cm peritoneal soft tissue nodule adjacent to and causing mass effect on the inferior right hepatic lobe, concerning for metastatic disease  VESSELS:  Extensive atherosclerotic changes are present  No evidence of aneurysm   PELVIS REPRODUCTIVE ORGANS:  Unremarkable for patient's age  URINARY BLADDER:  Unremarkable  ABDOMINAL WALL/INGUINAL REGIONS:  Tiny fat-containing umbilical hernia  OSSEOUS STRUCTURES:  No acute fracture or destructive osseous lesion  Spinal degenerative changes are noted  Impression: 1  Partially visualized at least 7 5 cm left lower lobe lung mass, concerning for primary malignancy  Nonemergent chest CT with contrast is recommended for further evaluation  2  A 2 8 cm peritoneal soft tissue nodule adjacent to the inferior right hepatic lobe, concerning for metastatic disease  The study was marked in EPIC for significant notification  Workstation performed: [id number]         Labs and pertinent reports reviewed  Disclaimer: This document was prepared using [employer] technology  If a word or phrase is confusing, or does not make sense, this is likely due to recognition error which was not discovered during the providers review  If you believe an error has occurred, please contact me for romario? cation

## 2021-08-20 NOTE — PLAN OF CARE
Problem: OCCUPATIONAL THERAPY ADULT  Goal: Performs self-care activities at highest level of function for planned discharge setting  See evaluation for individualized goals  Description: Treatment Interventions: Cognitive reorientation          See flowsheet documentation for full assessment, interventions and recommendations  Note: Limitation: Decreased cognition, Decreased Safe judgement during ADL  Prognosis: Fair  Assessment: Pt is a 80 y o  female admitted to [facility] on 8/19/2021 w/ Metastatic disease ([facility] )  has a past medical history of Atheroma of artery, History of stroke, Hypertension, Hypothyroidism, Intrahepatic bile duct stones, Irritable bowel syndrome, Murmur, and Stroke ([facility] 75 )  Pt with active OT orders and up with assistance  orders  Pt seen as a co-evaluation with PT due to the patient's co-morbidities, clinically unstable presentation/clinical complexity, and present impairments  As per pt report, pta, resides in a 1STA, 0STE, alone  Pt was I w/  ADLS and most IADLs, son and DIL assist with finances, grocery shopping, and medication mgmt  (-) drove  Upon evaluation, pt currently requires S with RW for transfers and mobility  Pt currently requires I eating and grooming, S UB ADLs, S LB ADLs, and S toileting  Pt is limited at this time 2*: cognitive impairments and decreased safety awareness  Pt to benefit from 1-2 additional session to further assess cognition in order to assist in safe discharge planning  From OT standpoint, recommendation would be home with increased supervision/social support  No further acute OT needs  D/C OT  Please re-consult if needed  Thank you  Pt was left in chair with alarm on after session with all current needs met        OT Discharge Recommendation: No rehabilitation needs (home with increased social support pending cog eval )  OT - OK to Discharge: No

## 2021-08-20 NOTE — PHYSICAL THERAPY NOTE
Physical Therapy Evaluation     Patient's Name: Hugo Sabillon    Admitting Diagnosis  Hypercalcemia [E83 52]  Poor appetite [R63 0]  Lung mass [R91 8]  Liver mass [R16 0]  Metastatic disease ([facility] ) [C79 9]  FTT (failure to thrive) in adult [R62 7]  ASHLEY (acute kidney injury) ([facility] ) [N17 9]    Problem List  Patient Active Problem List   Diagnosis    Hypothyroidism    Hypertension    Irritable bowel syndrome    History of stroke    Atheroma of artery    Transaminitis    Ampulla of Vater obstruction syndrome    Hyperkalemia    ASHLEY (acute kidney injury) ([facility] )    Metastatic disease ([facility] )    Lung mass    Nausea    PAF (paroxysmal atrial fibrillation) (HCC)    History of malignant neoplasm of ampulla of Vater    Aortic stenosis       Past Medical History  Past Medical History:   Diagnosis Date    Atheroma of artery     aorta    History of stroke     bifrontal CVA    Hypertension     Hypothyroidism     Intrahepatic bile duct stones     Irritable bowel syndrome     Murmur     Stroke [facility])        Past Surgical History  Past Surgical History:   Procedure Laterality Date    APPENDECTOMY      CARDIAC SURGERY      reveal linq cardiac monitor 5-26-15    CARPAL TUNNEL RELEASE      CHOLECYSTECTOMY      ERCP N/A 3/21/2016    Procedure: ENDOSCOPIC RETROGRADE CHOLANGIOPANCREATOGRAPHY (ERCP); Surgeon: Guadalupe Jordan MD;  [facility]; Service:     MO EGD TRANSORAL BIOPSY SINGLE/MULTIPLE N/A 3/21/2016    Procedure: RADIAL ENDOSCOPIC U/S;  Surgeon: Guadalupe Jordan MD;  [facility]; Service: Gastroenterology    MO ESOPHAGOGASTRODUODENOSCOPY TRANSORAL DIAGNOSTIC N/A 5/12/2016    Procedure: ESOPHAGOGASTRODUODENOSCOPY (EGD); Surgeon: Guadalupe Jordan MD;  [facility];   Service: Gastroenterology    TONSILLECTOMY          08/20/21 1015   PT Last Visit   PT Visit Date 08/20/21   Note Type   Note type Evaluation   Pain Assessment   Pain Assessment Tool Pain Assessment not indicated - pt denies pain   Pain Score No Pain   Home Living   Type of Home Apartment   Home Layout One level; Able to live on main level with bedroom/bathroom; Performs ADLs on one level  (0 KAILEY )   Bathroom Shower/Tub Walk-in shower   Bathroom Toilet Standard   Bathroom Equipment Grab bars in shower; Shower chair   [facility]   Prior Function   Level of [place] Independent with ADLs and functional mobility   Lives With Alone   Receives Help From Family   ADL Assistance Independent   IADLs Needs assistance   Falls in the last 6 months 0   Restrictions/Precautions   Weight Bearing Precautions Per Order No   Other Precautions Cognitive; Chair Alarm; Bed Alarm;Multiple lines; Fall Risk   General   Family/Caregiver Present No   Cognition   Arousal/Participation Cooperative   Orientation Level Oriented to person  (general place (hospital) and time (month and year) )   Memory Decreased recall of precautions;Decreased recall of recent events   Following Commands Follows one step commands with increased time or repetition   Comments Pt pleasant and cooperative to participate in therapy session    RLE Assessment   RLE Assessment   (grossly > 3+/5 assessed with mobility )   LLE Assessment   LLE Assessment   (grossly > 3+/5 assessed with mobility )   Bed Mobility   Supine to Sit Unable to assess   Sit to Supine Unable to assess   Additional Comments Pt sitting on toilet upon arrival  Pt sitting upright in bedside chair with chair alarm on with all needs within reach at the end of therapy session  Transfers   Sit to Stand 5  Supervision   Additional items Increased time required   Stand to Sit 5  Supervision   Additional items Increased time required   Additional Comments transfers with RW   Ambulation/Elevation   Gait pattern Excessively slow; Short stride; Foward flexed   Gait Assistance 5  Supervision   Additional items Verbal cues   Assistive Device Rolling walker   Distance 2 x 50ft    Balance   Static Sitting Fair +   Dynamic Sitting Fair   Static Standing Fair   Dynamic Standing Fair   Ambulatory Fair -   Activity Tolerance   Activity Tolerance Patient tolerated treatment well   Medical Staff Made Aware OT Mariana Hawkins; co-eval performed this date 2* increased medical complexity and multiple co-morbidities    Nurse Made Aware RN cleared pt to participate in therapy session    Assessment   Prognosis Good   Assessment Pt seen for mod complexity PT evaluation  Pt with active PT eval/treat orders  Pt is a 80 y o  female who was admitted to [facility] on 8/19/2021 with metastatic disease  Pt's active problems include: aortic stenosis, hx of malignant neoplasm of ampulla of vater, PAF, nausea, lung mass, ASHLEY, HTN, and hypothyroidism  Pt  has a past medical history of Atheroma of artery, History of stroke, Hypertension, Hypothyroidism, Intrahepatic bile duct stones, Irritable bowel syndrome, Murmur, and Stroke ([facility] )  Pt resides alone in 1 level apartment and was independent prior to hospital admission  Pt performed STS with RW at S level  Pt ambulated into hallway with RW at S level  Pt was left sitting upright in bedside chair with chair alarm on at the end of PT session with all needs in reach  Pt with no further acute inpatient PT needs at this time  PT to d/c pt and recommends home with increased social support- please also refer to OT cog assessment for additional recommendations  The patient's AM-PAC Basic Mobility Inpatient Short Form Raw Score is 20, Standardized Score is 43 99  A standardized score greater than 42 9 suggests the patient may benefit from discharge to home  Please also refer to the recommendation of the Physical Therapist for safe discharge planning  Barriers to Discharge Decreased caregiver support   Goals   Patient Goals to go to the bathroom    Plan   Treatment/Interventions Functional transfer training;LE strengthening/ROM; Endurance training;Cognitive reorientation;Patient/family training;Equipment eval/education;Gait training;Spoke to nursing;Spoke to case management;OT   PT Frequency   (d/c PT- eval only this date )   Recommendation   PT Discharge Recommendation No rehabilitation needs  (Increased social support )   Equipment Recommended Walker  (pt reports owning )   PT - OK to Discharge Yes  (once medically cleared )   Additional Comments Please refer to OT cog assessment for additional recommendations    AM-PAC Basic Mobility Inpatient   Turning in Bed Without Bedrails 4   Lying on Back to Sitting on Edge of Flat Bed 4   Moving Bed to Chair 3   Standing Up From Chair 3   Walk in Room 3   Climb 3-5 Stairs 3   Basic Mobility Inpatient Raw Score 20   Basic Mobility Standardized Score 43 99           Anjali Ian, PT, DPT

## 2021-08-20 NOTE — CASE MANAGEMENT
CM spoke with the pt's son and gathered the following information:    HOME:  Pt lives in a 1st floor apartment w/ 0 KAILEY    LIVES W/: Alone    : [facility] ([address] [place])  [phone]    MEDICAL POA: [facility] Cushing (Child/POA)  [phone]    PCP: Dr Ed Nunes: [facility] [place]    INDEPENDENCE: Ind at baseline w/ RW    TRANSPORTATION Appts: Family Transport    DME: RW    HHC: None reported    I/P REHAB: None reported    MENTAL HEALTH:None reported    D&A: None reported    TRANSPORTATION AT D/C:  Transport needed    Patient/caregiver received discharge checklist   Content reviewed  Patient/caregiver encouraged to participate in discharge plan of care prior to discharge home  CM reviewed d/c planning process including the following: identifying help at home, patient preference for d/c planning needs, Discharge Lounge, [facility] Meds to Bed program, availability of treatment team to discuss questions or concerns patient and/or family may have regarding understanding medications and recognizing signs and symptoms once discharged  CM also encouraged patient to follow up with all recommended appointments after discharge  Patient advised of importance for patient and family to participate in managing patients medical well being

## 2021-08-20 NOTE — OCCUPATIONAL THERAPY NOTE
Occupational Therapy Evaluation     Patient Name: Briana Carrera  [id number] Date: 8/20/2021  Problem List  Principal Problem:    Metastatic disease ([facility] )  Active Problems:    Hypothyroidism    Hypertension    ASHLEY (acute kidney injury) ([facility] )    Lung mass    Nausea    PAF (paroxysmal atrial fibrillation) ([facility] )    History of malignant neoplasm of ampulla of Vater    Aortic stenosis    Past Medical History  Past Medical History:   Diagnosis Date    Atheroma of artery     aorta    History of stroke     bifrontal CVA    Hypertension     Hypothyroidism     Intrahepatic bile duct stones     Irritable bowel syndrome     Murmur     Stroke [facility])      Past Surgical History  Past Surgical History:   Procedure Laterality Date    APPENDECTOMY      CARDIAC SURGERY      reveal linq cardiac monitor 5-26-15    CARPAL TUNNEL RELEASE      CHOLECYSTECTOMY      ERCP N/A 3/21/2016    Procedure: ENDOSCOPIC RETROGRADE CHOLANGIOPANCREATOGRAPHY (ERCP); Surgeon: Dede Watson MD;  [facility]; Service:     CT EGD TRANSORAL BIOPSY SINGLE/MULTIPLE N/A 3/21/2016    Procedure: RADIAL ENDOSCOPIC U/S;  Surgeon: Dede Watson MD;  [facility]; Service: Gastroenterology    CT ESOPHAGOGASTRODUODENOSCOPY TRANSORAL DIAGNOSTIC N/A 5/12/2016    Procedure: ESOPHAGOGASTRODUODENOSCOPY (EGD); Surgeon: Dede Watson MD;  [facility]; Service: Gastroenterology    TONSILLECTOMY             08/20/21 1016   OT Last Visit   OT Visit Date 08/20/21   Note Type   Note type Evaluation   Restrictions/Precautions   Weight Bearing Precautions Per Order No   Other Precautions Cognitive; Chair Alarm; Bed Alarm; Fall Risk   Pain Assessment   Pain Assessment Tool Pain Assessment not indicated - pt denies pain   Pain Score No Pain   Home Living   Type of Home Apartment   Home Layout One level  (0STE)   Bathroom Shower/Tub Walk-in shower   Bathroom Toilet Standard   Bathroom Equipment Grab bars in shower; Shower chair   Home Equipment Walker   Prior Function   Level of [place] Independent with ADLs and functional mobility   Lives With Alone   Receives Help From Family   ADL Assistance Independent   IADLs Needs assistance   Falls in the last 6 months 0   Vocational Retired   Comments Per pt, son assists with grocery shopping and finances, DIL assists with medication management    Lifestyle   Autonomy PTA, pt reports being I with ADLs and some IADLs, son and DIL assist with higher level IADLs   Reciprocal Relationships Family    Service to 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[employer], [address] [place] [address] ([address]) WDL   ADL   Where Assessed Edge of bed   [address] 7  Independent   Grooming Assistance 7  [address] 5  [address] [place] [address]  5  [address]  5  [address]  5  [address]  5  Supervision/Setup   Bed Mobility   Supine to [address] to assess   Sit to Supine Unable to assess   Additional Comments Upon arrival, pt seated on toilet in bathroom    Transfers   Sit to [address] [facility] to [place] [address] transfer 4  Minimal assistance   Additional items Increased time required;Standard toilet   Additional Comments transfers with RW    Functional Mobility   Functional Mobility 5  Supervision   Additional items Rolling walker   Balance   Static Sitting Fair +   Dynamic Sitting Fair   Static Standing Fair   Dynamic Standing [phone] -   Activity Tolerance   Activity Tolerance Patient tolerated treatment well   Medical Staff Made Aware PT Di Lang    Nurse Made Aware RN clearance for session    RUE Assessment   RUE Assessment WFL   LUE Assessment   LUE Assessment WFL   Hand Function   Gross Motor Coordination Functional   Fine Motor Coordination Functional   Sensation   Light Touch No apparent deficits   Vision-Basic Assessment   Current Vision Wears glasses all the time   Vision - Complex Assessment   Ocular Range of Motion WFL   Head Position WDL   Tracking Able to track stimulus in all quads without difficulty   Cognition   Overall Cognitive Status Impaired   Arousal/Participation Alert; Responsive;Arousable   Attention Attends with cues to redirect   Orientation Level Oriented to person  (generally oriented to time (month/year) and place (hospital))   Memory Decreased recall of precautions;Decreased short term memory;Decreased recall of biographical information   Following Commands Follows one step commands without difficulty   Comments Pt pleasant and cooperative t/o session    Assessment   Limitation Decreased cognition;Decreased Safe judgement during ADL   Prognosis Fair   Assessment Pt is a 80 y o  female admitted to [facility] on 8/19/2021 w/ Metastatic disease ([facility] )  has a past medical history of Atheroma of artery, History of stroke, Hypertension, Hypothyroidism, Intrahepatic bile duct stones, Irritable bowel syndrome, Murmur, and Stroke ([facility] )  Pt with active OT orders and up with assistance  orders  Pt seen as a co-evaluation with PT due to the patient's co-morbidities, clinically unstable presentation/clinical complexity, and present impairments  As per pt report, pta, resides in a 1STA, 0STE, alone  Pt was I w/  ADLS and most IADLs, son and DIL assist with finances, grocery shopping, and medication mgmt  (-) drove  Upon evaluation, pt currently requires S with RW for transfers and mobility  Pt currently requires I eating and grooming, S UB ADLs, S LB ADLs, and S toileting  Pt is limited at this time 2*: cognitive impairments and decreased safety awareness  Pt to benefit from 1-2 additional session to further assess cognition in order to assist in safe discharge planning  From OT standpoint, recommendation would be home with increased supervision/social support   No further acute OT needs  D/C OT  Please re-consult if needed  Thank you  Pt was left in chair with alarm on after session with all current needs met      Goals   LTG Time Frame 3-7   Plan   Treatment Interventions Cognitive reorientation   Goal Expiration Date 08/30/21   OT Frequency 1-2x/wk   Recommendation   OT Discharge Recommendation No rehabilitation needs  (home with increased social support pending cog eval )   OT - OK to Discharge No   AM-PAC Daily Activity Inpatient   Lower Body Dressing 3   Bathing 3   Toileting 3   Upper Body Dressing 3   Grooming 4   Eating 4   Daily Activity Raw Score 20   Daily Activity Standardized Score (Calc for Raw Score >=11) 42 03   AM-PAC Applied Cognition Inpatient   Following a Speech/Presentation 3   Understanding Ordinary Conversation 4   Taking Medications 3   Remembering Where Things Are Placed or Put Away 2   Remembering List of 4-5 Errands 2   Taking Care of Complicated Tasks 2   Applied Cognition Raw Score 16   Applied Cognition Standardized Score 35 03      GOALS     Pt will engage in ongoing cognitive assessment w/ G participation to assist w/ safe d/c planning/recommendations      Komal Horowitz MS, OTR/L

## 2021-08-20 NOTE — ASSESSMENT & PLAN NOTE
· Pulmonary input appreciated  · After discussing with oncology, family decided to pursue biopsy of the lung  · IR will be contacted by onc for biopsy  · currently in room air

## 2021-08-20 NOTE — PLAN OF CARE
Problem: OCCUPATIONAL THERAPY ADULT  Goal: Performs self-care activities at highest level of function for planned discharge setting  See evaluation for individualized goals  Description: Treatment Interventions: Cognitive reorientation          See flowsheet documentation for full assessment, interventions and recommendations  Outcome: Adequate for Discharge  Note: Limitation: Decreased cognition, Decreased Safe judgement during ADL  Prognosis: Fair  Assessment: Pt is a 81 yo female who presented to [facility] on 8/19/2021 with metastatic disease  Pt  has a past medical history of Atheroma of artery, History of stroke, Hypertension, Hypothyroidism, Intrahepatic bile duct stones, Irritable bowel syndrome, Murmur, and Stroke ([facility] )  Pt greeted up in recliner chair for OT treatment on 8/20/2021 for formal cognitive evaluation  Pt completed ACLS assessment and score of 3 6 - Pt requires 24/7 supervision  See details below note  Pt with no further acute care OT needs at this time  From OT standpoint recommendation is to return home with 24/7 supervision  DC skilled OT services  Please re-consult if appropriate        OT Discharge Recommendation: No rehabilitation needs (Home with 24/7 Supervision )  OT - OK to Discharge: Yes (When medically stable with appropriate Supervision)

## 2021-08-20 NOTE — ASSESSMENT & PLAN NOTE
· Reported on CT abdomen and pelvis: concerning for primary lung cancer metastatic to liver  · Oncology consult appreciated  · After discussion with daughter, decision was made to obtain IR guided lung biopsy

## 2021-08-20 NOTE — TELEMEDICINE
e-Consult (IPC)  - Interventional Radiology  Dre Barkley 80 y o  female MRN: [id number]  Unit/Bed#: [facility] 919-01 Encounter: [id number]    IR has been consulted to evaluate the patient, determine the appropriate procedure, and whether or not a procedure can and should be performed regarding the care of Dre Barkley  We were consulted by oncology concerning left lung mass, and to possibly perform a lung mass bx if medically appropriate for the patient  IP Consult to IR  Consult performed by: Farzaneh Wilson PA-C  Consult ordered by: Jeannette Barahona MD        08/20/21      Assessment/Recommendation:     80year old female presenting with nausea, abdominal pain  Found to have left lung mass on CT abdomen/pelvis    - will plan for lung biopsy on Tuesday, 8/24  From IR perspective, does not need to stay inpatient for lung biopsy  However, if patient continues inpatient status, will plan for Tuesday for lung biopsy  - would favor formal CT chest to determine safest biopsy location  - patient currently on aspirin and xarelto  Please begin holding aspirin  Xarelto needs to be held for 2 days (please hold Sunday and Monday)  - please keep npo after midnight Monday into Tuesday          Total time spent in review of data, discussion with requesting provider and rendering advice was 25 minutes       Patient or appropriate family member was verbally informed by oncology of this consultative service on their behalf to provide more timely access to specialty care in lieu of an in person consultation  Verbal consent was obtained  Thank you for allowing Interventional Radiology to participate in the care of Dre Barkley  Please don't hesitate to call or TigerText us with any questions       Farzaneh Wilson PA-C

## 2021-08-20 NOTE — PROGRESS NOTES
[facility]  Progress Note - Ivan Craig 10/5/1928, 80 y o  female MRN: [id number]  Unit/Bed#: [facility] 919-01 Encounter: [id number]  Primary Care Provider: Shae Arteaga   Date and time admitted to hospital: 8/19/2021  1:53 PM    * Metastatic disease [facility])  Assessment & Plan  · Reported on CT abdomen and pelvis: concerning for primary lung cancer metastatic to liver  · Oncology consult appreciated  · After discussion with daughter, decision was made to obtain IR guided lung biopsy    Aortic stenosis  Assessment & Plan  known moderate aortic stenosis  Last echo was 2015  Follows with OP cardio Dr Tamanna Christy office for PAF    History of malignant neoplasm of ampulla of Vater  Assessment & Plan  Noted    PAF (paroxysmal atrial fibrillation) ([facility] )  Assessment & Plan  · Continue Xarelto    Nausea  Assessment & Plan  · Probably due to metastatic disease  · Patient would like to eat a regular diet  · Aspiration precautions  · Will order IV Zofran as needed for nausea and vomiting    Lung mass  Assessment & Plan  · Pulmonary input appreciated  · After discussing with oncology, family decided to pursue biopsy of the lung  · IR will be contacted by onc for biopsy  · currently in room air    ASHLEY (acute kidney injury) ([facility] )  Assessment & Plan  · Probably due to dehydration in combination with hydrochlorothiazide use  · With IVF, creatinine improved to 1 1  · Hold hydrochlorothiazide for now, BP remains stable off HCTZ  May DC on discharge  · Repeat labs in a m  Hypertension  Assessment & Plan  · Continue amlodipine    Hypothyroidism  Assessment & Plan  · Continue levothyroxine       VTE Pharmacologic Prophylaxis:   Pharmacologic: Rivaroxaban (Xarelto)  Mechanical VTE Prophylaxis in Place: Yes    Patient Centered Rounds: I have performed bedside rounds with nursing staff today      Discussions with Specialists or Other Care Team Provider: oncology, pulmonology, palliative care    Education CREATININE mg/dL 1 19   ANION GAP mmol/L 5   CALCIUM mg/dL 8 5   ALBUMIN g/dL 1 5*   TOTAL BILIRUBIN mg/dL 0 39   ALK PHOS U/L 152*   ALT U/L 14   AST U/L 17   GLUCOSE RANDOM mg/dL 94                        Recent Cultures (last 7 days):           Last 24 Hours Medication List:   Current Facility-Administered Medications   Medication Dose Route Frequency Provider Last Rate    amLODIPine  5 mg Oral Daily Flori Fear, MD      aspirin  81 mg Oral Daily Flori Fear, MD      atorvastatin  40 mg Oral Daily Flori Fear, MD      bisacodyl  10 mg Rectal Daily PRN Perlita L Ruchi, DO      levothyroxine  100 mcg Oral Daily Flori Fear, MD      melatonin  3 mg Oral HS Flori Fear, MD      multivitamin-minerals  1 tablet Oral Daily Flori Inna, MD      ondansetron  4 mg Intravenous Q4H PRN Flori Keith, MD      polyethylene glycol  17 g Oral Daily PRN Perlita L Ruchi, DO      potassium chloride  40 mEq Oral Once Dinesh Roy MD      rivaroxaban  15 mg Oral HS Flori Inna, MD      senna  1 tablet Oral HS Donnelle L Norfolk, DO      sodium chloride  75 mL/hr Intravenous Continuous Flori Keith, MD 75 mL/hr (08/19/21 2114)        Today, Patient Was Seen By: Dinesh Roy MD    ** Please Note: Dictation voice to text software may have been used in the creation of this document   **

## 2021-08-21 PROBLEM — Z65.9 SOCIAL PROBLEM: Status: ACTIVE | Noted: 2021-01-01

## 2021-08-21 NOTE — ASSESSMENT & PLAN NOTE
· Reported on CT abdomen and pelvis: concerning for primary lung cancer metastatic to liver  · Oncology consult appreciated  · After discussion with daughter, decision was made to obtain IR guided lung biopsy  · Scheduled for lung biopsy on Tuesday  · Although patient can obtain the biopsy outpatient, however patient needs long-term care therefore will likely stay until Tuesday anyway    · Hold aspirin, hold Xarelto from tomorrow

## 2021-08-21 NOTE — ASSESSMENT & PLAN NOTE
· Probably due to dehydration in combination with hydrochlorothiazide use  · With IVF, creatinine improved to 1 1  · Hold hydrochlorothiazide for now, BP remains stable off HCTZ   May DC on discharge  · Discontinue fluid

## 2021-08-21 NOTE — PROGRESS NOTES
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  · Oncology consult appreciated  · After discussion with daughter, decision was made to obtain IR guided lung biopsy  · Scheduled for lung biopsy on Tuesday  · Although patient can obtain the biopsy outpatient, however patient needs long-term care therefore will likely stay until Tuesday anyway  · Hold aspirin, hold Xarelto from tomorrow    Social problem  Assessment & Plan  Patient currently lives alone by herself  PT and OT consult appreciated  Although they do not recommend any rehab however recommends cognitive evaluation  Son does not feel patient is safe to return to home as she will not have any supervision  He wishes patient to be placed in long-term nursing facility  Pending cognitive eval  Case management following    Aortic stenosis  Assessment & Plan  known moderate aortic stenosis  Last echo was 2015     Follows with OP cardio Dr Torres [place] office for PAF    History of malignant neoplasm of ampulla of Vater  Assessment & Plan  Noted    PAF (paroxysmal atrial fibrillation) ([facility] [address] )  Assessment & Plan  · Continue Xarelto, will hold from Sunday until biopsy on Tuesday    Nausea  Assessment & Plan  · Probably due to metastatic disease  · Patient would like to eat a regular diet  · Aspiration precautions  · Resolved  · Speech therapy consult appreciated    Lung mass  Assessment & Plan  · Pulmonary input appreciated  · After discussing with oncology, family decided to pursue biopsy of the lung  · currently in room air  · Scheduled for IR guided biopsy on Tuesday    ASHLEY (acute kidney injury) ([facility] [address] )  Assessment & Plan  · Probably due to dehydration in combination with hydrochlorothiazide use  · With IVF, creatinine improved to 1 1  · Hold hydrochlorothiazide for now, BP remains stable off HCTZ  May DC on discharge  · Discontinue fluid    Hypertension  Assessment & Plan  · Continue amlodipine    Hypothyroidism  Assessment & Plan  · Continue levothyroxine         VTE Pharmacologic Prophylaxis:   Pharmacologic: Rivaroxaban (Xarelto)  Mechanical VTE Prophylaxis in Place: Yes    Patient Centered Rounds: I have performed bedside rounds with nursing staff today  Current Length of Stay: 2 day(s)    Current Patient Status: Inpatient   Certification Statement: The patient will continue to require additional inpatient hospital stay due to Pending placement in long-term care, pending biopsy on Tuesday    Discharge Plan: Will need long-term care    Code Status: Level 3 - DNAR and DNI      Subjective:   Patient is lying, on the bed without any distress  Patient does not have any respiratory distress  Patient does have left leg swelling  Denies any pain associated with it  Objective:     Vitals:   Temp (24hrs), Av 4 °F (36 9 °C), Min:98 3 °F (36 8 °C), Max:98 5 °F (36 9 °C)    Temp:  [98 3 °F (36 8 °C)-98 5 °F (36 9 °C)] 98 3 °F (36 8 °C)  HR:  [56-89] 56  Resp:  [18-20] 18  BP: (122-123)/(53-56) 123/56  SpO2:  [85 %-93 %] 85 %  Body mass index is 31 09 kg/m²  Input and Output Summary (last 24 hours): Intake/Output Summary (Last 24 hours) at 2021 1311  Last data filed at 2021 1152  Gross per 24 hour   Intake 2852 5 ml   Output 300 ml   Net 2552 5 ml       Physical Exam:     Physical Exam  Constitutional:       Appearance: She is well-developed  HENT:      Head: Normocephalic and atraumatic  Pulmonary:      Effort: Pulmonary effort is normal  No respiratory distress  Breath sounds: Normal breath sounds  Musculoskeletal:      Cervical back: Normal range of motion  Right lower leg: No edema  Left lower leg: Edema present  Comments: Left lower leg pitting edema noted, 1+  No pitting edema the right leg  Skin:     General: Skin is warm and dry  Labs:    Results from last 7 days   Lab Units 08/21/21  0500 08/19/21  1515   WBC Thousand/uL 8 88 9 38   HEMOGLOBIN g/dL 9 5* 10 2*   HEMATOCRIT % 29 7* 32 1*   PLATELETS Thousands/uL 303 357   NEUTROS PCT %  --  80*   LYMPHS PCT %  --  11*   MONOS PCT %  --  8   EOS PCT %  --  1     Results from last 7 days   Lab Units 08/21/21  0500 08/20/21  0500   SODIUM mmol/L 136 137   POTASSIUM mmol/L 3 7 3 2*   CHLORIDE mmol/L 105 105   CO2 mmol/L 27 27   BUN mg/dL 22 30*   CREATININE mg/dL 0 87 1 19   ANION GAP mmol/L 4 5   CALCIUM mg/dL 8 6 8 5   ALBUMIN g/dL  --  1 5*   TOTAL BILIRUBIN mg/dL  --  0 39   ALK PHOS U/L  --  152*   ALT U/L  --  14   AST U/L  --  17   GLUCOSE RANDOM mg/dL 95 94                                  Last 24 Hours Medication List:   Current Facility-Administered Medications   Medication Dose Route Frequency Provider Last Rate    amLODIPine  5 mg Oral Daily Lexi Hawkins MD      atorvastatin  40 mg Oral Daily Lexi Hawkins MD      bisacodyl  10 mg Rectal Daily PRN Perlita MORROW Crouse, DO      docusate sodium  100 mg Oral BID Carson Taylor MD      levothyroxine  100 mcg Oral Daily Lexi Hawkins MD      melatonin  3 mg Oral HS Lexi Hawkins MD      multivitamin-minerals  1 tablet Oral Daily Lexi Hawkins MD      ondansetron  4 mg Intravenous Q4H PRN Lexi Hawkins MD      polyethylene glycol  17 g Oral Daily PRN Perlita MORROW Crouse, DO      rivaroxaban  15 mg Oral HS Carson Taylor MD      senna  1 tablet Oral HS Perlita Hopper, DO          Today, Patient Was Seen By: Carson Taylor MD    ** Please Note: Dictation voice to text software may have been used in the creation of this document   **

## 2021-08-21 NOTE — ASSESSMENT & PLAN NOTE
· Pulmonary input appreciated  · After discussing with oncology, family decided to pursue biopsy of the lung  · currently in room air  · Scheduled for IR guided biopsy on Tuesday

## 2021-08-21 NOTE — ASSESSMENT & PLAN NOTE
known moderate aortic stenosis  Last echo was 2015     Follows with OP cardio Dr Haylee Whelan office for PAF

## 2021-08-21 NOTE — ASSESSMENT & PLAN NOTE
Patient currently lives alone by herself  PT and OT consult appreciated  Although they do not recommend any rehab however recommends cognitive evaluation  Son does not feel patient is safe to return to home as she will not have any supervision  He wishes patient to be placed in long-term nursing facility    Pending cognitive eval  Case management following

## 2021-08-21 NOTE — ASSESSMENT & PLAN NOTE
· Probably due to metastatic disease  · Patient would like to eat a regular diet  · Aspiration precautions  · Resolved  · Speech therapy consult appreciated

## 2021-08-22 NOTE — PROGRESS NOTES
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  · Oncology consult appreciated  · After discussion with daughter, decision was made to obtain IR guided lung biopsy  · Scheduled for lung biopsy on Tuesday  · Although patient can obtain the biopsy outpatient, however patient needs long-term care therefore will likely stay until Tuesday anyway  · Hold aspirin, hold Xarelto   · Pending chest x-ray with persistent point tenderness over sternum    Social problem  Assessment & Plan  Patient currently lives alone by herself  PT and OT consult appreciated  Although they do not recommend any rehab however recommends cognitive evaluation  Son does not feel patient is safe to return to home as she will not have any supervision  He wishes patient to be placed in long-term nursing facility  Pending cognitive eval  Case management following    Aortic stenosis  Assessment & Plan  known moderate aortic stenosis  Last echo was 2015     Follows with OP cardio Dr Daniella Diamond office for PAF    History of malignant neoplasm of ampulla of Vater  Assessment & Plan  Noted    PAF (paroxysmal atrial fibrillation) ([facility] [address] )  Assessment & Plan  · Continue Xarelto, will hold from Sunday until biopsy on Tuesday    Nausea  Assessment & Plan  · Probably due to metastatic disease  · Patient would like to eat a regular diet  · Aspiration precautions  · Resolved  · Speech therapy consult appreciated    Lung mass  Assessment & Plan  · Pulmonary input appreciated  · After discussing with oncology, family decided to pursue biopsy of the lung  · currently in room air  · Scheduled for IR guided biopsy on Tuesday    ASHLEY (acute kidney injury) ([facility] [address] )  Assessment & Plan  · Probably due to dehydration in combination with hydrochlorothiazide use  · With IVF, creatinine improved to 1 1  · Hold hydrochlorothiazide for now, BP remains stable off HCTZ  May DC on discharge  · Discontinue fluid    Hypertension  Assessment & Plan  · Continue amlodipine    Hypothyroidism  Assessment & Plan  · Continue levothyroxine      VTE Pharmacologic Prophylaxis:   Pharmacologic: Pharmacologic VTE Prophylaxis contraindicated due to Anticoagulation on hold until biopsy Tuesday  Mechanical VTE Prophylaxis in Place: Yes    Patient Centered Rounds: I have performed bedside rounds with nursing staff today  Current Length of Stay: 3 day(s)    Current Patient Status: Inpatient   Certification Statement: The patient will continue to require additional inpatient hospital stay due to Pending biopsy of the lung mass    Discharge Plan: Will need long-term care    Code Status: Level 3 - DNAR and DNI      Subjective:   Patient complains of point tenderness over the sternum that has been ongoing since admission  Objective:     Vitals:   Temp (24hrs), Av 1 °F (36 7 °C), Min:97 5 °F (36 4 °C), Max:98 6 °F (37 °C)    Temp:  [97 5 °F (36 4 °C)-98 6 °F (37 °C)] 97 5 °F (36 4 °C)  HR:  [58-76] 61  Resp:  [16-20] 20  BP: (116-133)/(52-63) 133/63  SpO2:  [90 %-93 %] 90 %  Body mass index is 31 09 kg/m²  Input and Output Summary (last 24 hours): Intake/Output Summary (Last 24 hours) at 2021 1152  Last data filed at 2021 7984  Gross per 24 hour   Intake 323 75 ml   Output 200 ml   Net 123 75 ml       Physical Exam:     Physical Exam  Constitutional:       Appearance: She is well-developed  HENT:      Head: Normocephalic and atraumatic  Pulmonary:      Effort: Pulmonary effort is normal  No respiratory distress  Breath sounds: Normal breath sounds  Musculoskeletal:      Cervical back: Normal range of motion        Comments: Bony tenderness noted over the sternum   Skin:     General: Skin is warm and dry  Labs:    Results from last 7 days   Lab Units 08/21/21  0500 08/19/21  1515   WBC Thousand/uL 8 88 9 38   HEMOGLOBIN g/dL 9 5* 10 2*   HEMATOCRIT % 29 7* 32 1*   PLATELETS Thousands/uL 303 357   NEUTROS PCT %  --  80*   LYMPHS PCT %  --  11*   MONOS PCT %  --  8   EOS PCT %  --  1     Results from last 7 days   Lab Units 08/21/21  0500 08/20/21  0500   SODIUM mmol/L 136 137   POTASSIUM mmol/L 3 7 3 2*   CHLORIDE mmol/L 105 105   CO2 mmol/L 27 27   BUN mg/dL 22 30*   CREATININE mg/dL 0 87 1 19   ANION GAP mmol/L 4 5   CALCIUM mg/dL 8 6 8 5   ALBUMIN g/dL  --  1 5*   TOTAL BILIRUBIN mg/dL  --  0 39   ALK PHOS U/L  --  152*   ALT U/L  --  14   AST U/L  --  17   GLUCOSE RANDOM mg/dL 95 94                          Recent Cultures (last 7 days):           Last 24 Hours Medication List:   Current Facility-Administered Medications   Medication Dose Route Frequency Provider Last Rate    acetaminophen  650 mg Oral Q6H PRN Haylie Hernandez MD      amLODIPine  5 mg Oral Daily Gladys Mueller MD      atorvastatin  40 mg Oral Daily Gladys Mueller MD      bisacodyl  10 mg Rectal Daily PRN Perlita L Buchtel, DO      docusate sodium  100 mg Oral BID Haylie Hernandez MD      levothyroxine  100 mcg Oral Daily Gldays Mueller MD      melatonin  3 mg Oral HS Gladys Mueller MD      multivitamin-minerals  1 tablet Oral Daily Gladys Mueller MD      ondansetron  4 mg Intravenous Q4H PRN Gladys Mueller MD      polyethylene glycol  17 g Oral Daily PRN Donnelle L Buchtel, DO      senna  1 tablet Oral HS Donnelle L Buchtel, DO          Today, Patient Was Seen By: Haylie Hernandez MD    ** Please Note: Dictation voice to text software may have been used in the creation of this document   **

## 2021-08-22 NOTE — ASSESSMENT & PLAN NOTE
known moderate aortic stenosis  Last echo was 2015     Follows with OP cardio Dr Marmolejo Servmarichuy office for PAF

## 2021-08-22 NOTE — ASSESSMENT & PLAN NOTE
· Reported on CT abdomen and pelvis: concerning for primary lung cancer metastatic to liver  · Oncology consult appreciated  · After discussion with daughter, decision was made to obtain IR guided lung biopsy  · Scheduled for lung biopsy on Tuesday  · Although patient can obtain the biopsy outpatient, however patient needs long-term care therefore will likely stay until Tuesday anyway    · Hold aspirin, hold Xarelto   · Pending chest x-ray with persistent point tenderness over sternum

## 2021-08-23 NOTE — PLAN OF CARE
Problem: Potential for Falls  Goal: Patient will remain free of falls  Description: INTERVENTIONS:  - Educate patient/family on patient safety including physical limitations  - Instruct patient to call for assistance with activity   - Consult OT/PT to assist with strengthening/mobility   - Keep Call bell within reach  - Keep bed low and locked with side rails adjusted as appropriate  - Keep care items and personal belongings within reach  - Initiate and maintain comfort rounds  - Make Fall Risk Sign visible to staff  - Offer Toileting every 2 Hours, in advance of need  - Initiate/Maintain alarm  - Obtain necessary fall risk management equipment:   - Apply yellow socks and bracelet for high fall risk patients  - Consider moving patient to room near nurses station  Outcome: Progressing     Problem: Nutrition/Hydration-ADULT  Goal: Nutrient/Hydration intake appropriate for improving, restoring or maintaining nutritional needs  Description: Monitor and assess patient's nutrition/hydration status for malnutrition  Collaborate with interdisciplinary team and initiate plan and interventions as ordered  Monitor patient's weight and dietary intake as ordered or per policy  Utilize nutrition screening tool and intervene as necessary  Determine patient's food preferences and provide high-protein, high-caloric foods as appropriate       INTERVENTIONS:  - Monitor oral intake, urinary output, labs, and treatment plans  - Assess nutrition and hydration status and recommend course of action  - Evaluate amount of meals eaten  - Assist patient with eating if necessary   - Allow adequate time for meals  - Recommend/ encourage appropriate diets, oral nutritional supplements, and vitamin/mineral supplements  - Order, calculate, and assess calorie counts as needed  - Recommend, monitor, and adjust tube feedings and TPN/PPN based on assessed needs  - Assess need for intravenous fluids  - Provide specific nutrition/hydration education as appropriate  - Include patient/family/caregiver in decisions related to nutrition  Outcome: Progressing     Problem: MOBILITY - ADULT  Goal: Maintain or return to baseline ADL function  Description: INTERVENTIONS:  -  Assess patient's ability to carry out ADLs; assess patient's baseline for ADL function and identify physical deficits which impact ability to perform ADLs (bathing, care of mouth/teeth, toileting, grooming, dressing, etc )  - Assess/evaluate cause of self-care deficits   - Assess range of motion  - Assess patient's mobility; develop plan if impaired  - Assess patient's need for assistive devices and provide as appropriate  - Encourage maximum independence but intervene and supervise when necessary  - Involve family in performance of ADLs  - Assess for home care needs following discharge   - Consider OT consult to assist with ADL evaluation and planning for discharge  - Provide patient education as appropriate  Outcome: Progressing  Goal: Maintains/Returns to pre admission functional level  Description: INTERVENTIONS:  - Perform BMAT or MOVE assessment daily    - Set and communicate daily mobility goal to care team and patient/family/caregiver  - Collaborate with rehabilitation services on mobility goals if consulted  - Perform Range of Motion 3 times a day  - Reposition patient every 2 hours    - Dangle patient 3 times a day  - Stand patient 3 times a day  - Ambulate patient 3 times a day  - Out of bed to chair 3 times a day   - Out of bed for meals 3 times a day  - Out of bed for toileting  - Record patient progress and toleration of activity level   Outcome: Progressing     Problem: Prexisting or High Potential for Compromised Skin Integrity  Goal: Skin integrity is maintained or improved  Description: INTERVENTIONS:  - Identify patients at risk for skin breakdown  - Assess and monitor skin integrity  - Assess and monitor nutrition and hydration status  - Monitor labs   - Assess for incontinence - Turn and reposition patient  - Assist with mobility/ambulation  - Relieve pressure over bony prominences  - Avoid friction and shearing  - Provide appropriate hygiene as needed including keeping skin clean and dry  - Evaluate need for skin moisturizer/barrier cream  - Collaborate with interdisciplinary team   - Patient/family teaching  - Consider wound care consult   Outcome: Progressing

## 2021-08-23 NOTE — OCCUPATIONAL THERAPY NOTE
Occupational Therapy Screen         Patient Name: Briana Carrera  VSDTL'Y Date: 8/23/2021 08/23/21 1969   OT Last Visit   OT Visit Date 08/23/21   Note Type   Note type Screen       New OT orders placed for cognitive evaluation  However, Jaci Patel Cognitive Level Screen performed 8/20  Pt scored 3 6 indicating 24/7 supervision is indicated  Will d/c OT orders at this time  Please re-consult if needed  Thank you!     Oni Handy MS, OTR/L [place]-9 years to 21 years...

## 2021-08-23 NOTE — ASSESSMENT & PLAN NOTE
known moderate aortic stenosis  Last echo was 2015     Follows with OP cardio Dr Kalli Mendez office for PAF

## 2021-08-23 NOTE — ASSESSMENT & PLAN NOTE
· Reported on CT abdomen and pelvis: concerning for primary lung cancer metastatic to liver  · Oncology consult appreciated  · After discussion with daughter, decision was made to obtain IR guided lung biopsy  · Scheduled for lung biopsy on Tuesday  · Although patient can obtain the biopsy outpatient, however patient needs long-term care therefore will likely stay until Tuesday anyway    · Hold aspirin, hold Xarelto

## 2021-08-23 NOTE — PROGRESS NOTES
[facility]  Progress Note - Manoj Dugan 10/5/1928, 80 y o  female MRN: [id number]  Unit/Bed#: 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  · Oncology consult appreciated  · After discussion with daughter, decision was made to obtain IR guided lung biopsy  · Scheduled for lung biopsy on Tuesday  · Although patient can obtain the biopsy outpatient, however patient needs long-term care therefore will likely stay until Tuesday anyway  · Hold aspirin, hold Xarelto     Social problem  Assessment & Plan  Patient currently lives alone by herself  PT and OT consult appreciated  Although they do not recommend any rehab however recommends cognitive evaluation  Son does not feel patient is safe to return to home as she will not have any supervision  He wishes patient to be placed in long-term nursing facility  Pending cognitive eval  Case management following    Aortic stenosis  Assessment & Plan  known moderate aortic stenosis  Last echo was 2015     Follows with OP cardio Dr Yulia Lopez office for PAF    History of malignant neoplasm of ampulla of Vater  Assessment & Plan  Noted    PAF (paroxysmal atrial fibrillation) ([facility] [address] )  Assessment & Plan  · Continue Xarelto, will hold from Sunday until biopsy on Tuesday    Nausea  Assessment & Plan  · Probably due to metastatic disease  · Patient would like to eat a regular diet  · Aspiration precautions  · Resolved  · Speech therapy consult appreciated    Lung mass  Assessment & Plan  · Pulmonary input appreciated  · After discussing with oncology, family decided to pursue biopsy of the lung  · currently in room air  · Scheduled for IR guided biopsy on Tuesday    ASHLEY (acute kidney injury) ([facility] [address] )  Assessment & Plan  · Probably due to dehydration in combination with hydrochlorothiazide use  · With IVF, creatinine improved to 1 1  · Hold hydrochlorothiazide for now, BP remains stable off HCTZ  May DC on discharge  · Discontinue fluid    Hypertension  Assessment & Plan  · Continue amlodipine    Hypothyroidism  Assessment & Plan  · Continue levothyroxine        VTE Pharmacologic Prophylaxis:   Pharmacologic: Pharmacologic VTE Prophylaxis contraindicated due to pending biopsy tomorrow  Mechanical VTE Prophylaxis in Place: Yes    Patient Centered Rounds: I have performed bedside rounds with nursing staff today  Current Length of Stay: 4 day(s)    Current Patient Status: Inpatient   Certification Statement: The patient will continue to require additional inpatient hospital stay due to pending lung biopsy tomorrow    Discharge Plan: pending progress    Code Status: Level 3 - DNAR and DNI      Subjective:   No acute overnight events  Patient is comfortable  Objective:     Vitals:   Temp (24hrs), Av 1 °F (36 7 °C), Min:97 8 °F (36 6 °C), Max:98 4 °F (36 9 °C)    Temp:  [97 8 °F (36 6 °C)-98 4 °F (36 9 °C)] 97 8 °F (36 6 °C)  HR:  [72-75] 75  Resp:  [18] 18  BP: (122-131)/(58-63) 131/58  SpO2:  [93 %-97 %] 95 %  Body mass index is 31 09 kg/m²  Input and Output Summary (last 24 hours): Intake/Output Summary (Last 24 hours) at 2021 0845  Last data filed at 2021 1414  Gross per 24 hour   Intake 240 ml   Output --   Net 240 ml       Physical Exam:     Physical Exam  Constitutional:       Appearance: She is well-developed  HENT:      Head: Normocephalic and atraumatic  Pulmonary:      Effort: Pulmonary effort is normal  No respiratory distress  Breath sounds: Normal breath sounds  Musculoskeletal:      Cervical back: Normal range of motion  Skin:     General: Skin is warm and dry              Results from last 7 days   Lab Units 21  0805 21  1515   WBC Thousand/uL 11 96* 9 38 HEMOGLOBIN g/dL 10 7* 10 2*   HEMATOCRIT % 33 4* 32 1*   PLATELETS Thousands/uL 360 357   NEUTROS PCT %  --  80*   LYMPHS PCT %  --  11*   MONOS PCT %  --  8   EOS PCT %  --  1     Results from last 7 days   Lab Units 08/23/21  0805 08/20/21  0500   SODIUM mmol/L 135* 137   POTASSIUM mmol/L 3 9 3 2*   CHLORIDE mmol/L 104 105   CO2 mmol/L 27 27   BUN mg/dL 17 30*   CREATININE mg/dL 0 83 1 19   ANION GAP mmol/L 4 5   CALCIUM mg/dL 8 9 8 5   ALBUMIN g/dL  --  1 5*   TOTAL BILIRUBIN mg/dL  --  0 39   ALK PHOS U/L  --  152*   ALT U/L  --  14   AST U/L  --  17   GLUCOSE RANDOM mg/dL 92 94                          Recent Cultures (last 7 days):     Results from last 7 days   Lab Units 08/19/21  1655   URINE CULTURE  <10,000 cfu/ml        Last 24 Hours Medication List:   Current Facility-Administered Medications   Medication Dose Route Frequency Provider Last Rate    acetaminophen  650 mg Oral Q6H PRN Patt Carlos MD      amLODIPine  5 mg Oral Daily Albert Churchill MD      atorvastatin  40 mg Oral Daily Albert Churchill MD      bisacodyl  10 mg Rectal Daily PRN Perlita MORROW Crouse, DO      docusate sodium  100 mg Oral BID Patt Carlos MD      glycerin-hypromellose-  1 drop Both Eyes BID Patt Carlos MD      levothyroxine  100 mcg Oral Daily Albert Churchill MD      lidocaine  1 patch Topical Daily Arun Miguel PA-C      melatonin  3 mg Oral HS Albert Churchill MD      multivitamin-minerals  1 tablet Oral Daily Albert Churchill MD      ondansetron  4 mg Intravenous Q4H PRN Albert Churchill MD      polyethylene glycol  17 g Oral Daily PRN Donfitz MORROW [place], DO      senna  1 tablet Oral HS Perlita Hopper, DO          Today, Patient Was Seen By: Patt Carlos MD    ** Please Note: Dictation voice to text software may have been used in the creation of this document   **

## 2021-08-23 NOTE — PROGRESS NOTES
Message left to call the clinic concerning the upper extremity assessment which was to be done at [place].  This needs to be completed before her return appointment with Dr Franco which is scheduled 07/11/19.    Progress Note - Palliative & Supportive Care  Lucila Ross  80 y o   female  [facility] 919/[facility] 919-01   MRN: [id number]  Encounter: [id number]     Assessment/Plan:  1  Metastatic disease  2  Lung mass  3  Abdominal pain  4  Nausea  5  ASHLEY  6  Constipation  7  Deconditioning, debility, ambulatory dysfunction  8  H/o duodenitis, ampulla of Vater  9  Goals of care  -continue current medical care/optimization  -sons agreeable to biopsy, they are hoping for more information to guide decisions regarding plan of care and whether to pursue snf/rehab vs ltc w/ or w/o hospice  -delirium precautions -please minimize interruptions and prioritize sleep at night  No TV nor screen time at night  Shades drawn at night  During day, shades up, minimize napping, and encourage meals in chair   -will follow peripherally as needed      Code status: Level 3, DNAR/DNI    Subjective:  Patient seen and examined  Confused  Denies pain or shortness of breath        Medications    Current Facility-Administered Medications:     acetaminophen (TYLENOL) tablet 650 mg, 650 mg, Oral, Q6H PRN, Bernadette Gonzalez MD, 650 mg at 08/22/21 1644    amLODIPine (NORVASC) tablet 5 mg, 5 mg, Oral, Daily, Sameera Morse MD, 5 mg at 08/22/21 5900    atorvastatin (LIPITOR) tablet 40 mg, 40 mg, Oral, Daily, Sameera Morse MD, 40 mg at 08/22/21 9756    bisacodyl (DULCOLAX) rectal suppository 10 mg, 10 mg, Rectal, Daily PRN, Perlita Hopper DO    docusate sodium (COLACE) capsule 100 mg, 100 mg, Oral, BID, Sanket Paredes MD, 100 mg at 08/22/21 0928    glycerin-hypromellose- (ARTIFICIAL TEARS) ophthalmic solution 1 drop, 1 drop, Both Eyes, BID, Sanket Paredes MD, 1 drop at 08/22/21 1814    levothyroxine tablet 100 mcg, 100 mcg, Oral, Daily, Sameera Morse MD, 100 mcg at 08/22/21 0711    lidocaine (LIDODERM) 5 % patch 1 patch, 1 patch, Topical, Daily, Kelly Ledbetter PA-C, 1 patch at 08/22/21 2133    melatonin tablet 3 mg, 3 mg, Oral, HS, Nic Proper Christine Carter MD, 3 mg at 08/22/21 2133    multivitamin-minerals (CENTRUM) tablet 1 tablet, 1 tablet, Oral, Daily, Kaiser Galindo MD, 1 tablet at 08/22/21 0928    ondansetron [facility]) injection 4 mg, 4 mg, Intravenous, Q4H PRN, Kaiser Galindo MD, 4 mg at 08/20/21 2256    polyethylene glycol (MIRALAX) packet 17 g, 17 g, Oral, Daily PRN, Perlita MORROW [place], DO    senna (SENOKOT) tablet 8 6 mg, 1 tablet, Oral, HS, Perlita MORROW Ruchi, DO, 8 6 mg at 08/22/21 2133    Objective:  /58   Pulse 75   Temp 97 8 °F (36 6 °C)   Resp 18   Ht 5' 2" (1 575 m)   Wt 77 1 kg (170 lb)   SpO2 95%   BMI 31 09 kg/m²   Physical Exam:  General: nad, confused, restless  Neurological: aaox2, intermittently confused  Cardiovascular: reg  Respiratory: normal effort, no distress  Gastrointestinal: soft, nt, nd  Musculoskeletal: no edema  Skin: warm, dry  Psychiatric: pleasantly confused, restless    Lab Results: I have personally reviewed pertinent labs  , CBC: No results found for: WBC, HGB, HCT, MCV, PLT, ADJUSTEDWBC, MCH, MCHC, RDW, MPV, NRBC, CMP: No results found for: SODIUM, K, CL, CO2, ANIONGAP, BUN, CREATININE, GLUCOSE, CALCIUM, AST, ALT, ALKPHOS, PROT, BILITOT, EGFR, PT/PTT:No results found for: PT, PTT    Counseling / Coordination of Care  Total floor / unit time spent today 35 minutes  Greater than 50% of total time was spent with the patient and / or family counseling and / or coordinating of care  A description of the counseling / coordination of care: current condition, goals of care       Lucia Lai DO  Palliative & Supportive Care

## 2021-08-24 NOTE — ASSESSMENT & PLAN NOTE
· Probably due to metastatic disease  · Patient would like to eat a regular diet  · Aspiration precautions  · Speech therapy consult appreciated  · patient complains of nausea only associated with water intake  No nausea with ginger-saul     · Will start her on protonix

## 2021-08-24 NOTE — PROGRESS NOTES
[address] [facility]  Progress Note - Laya Echeverria 10/5/1928, 80 y o  female MRN: [id number]  Unit/Bed#: [facility] 919-01 Encounter: [id number]  Primary Care Provider: Erasmo Rajput   Date and time admitted to hospital: 8/19/2021  1:53 PM    * Metastatic disease ([facility] [address] )  Assessment & Plan  · Reported on CT abdomen and pelvis: concerning for primary lung cancer metastatic to liver  · Oncology consult appreciated  · After discussion with daughter, decision was made to obtain IR guided lung biopsy  · Scheduled for lung biopsy today  · Hold aspirin, hold Xarelto     Social problem  Assessment & Plan  Patient currently lives alone by herself  PT and OT consult appreciated  Although they do not recommend any rehab however recommends cognitive evaluation  Son does not feel patient is safe to return to home as she will not have any supervision  He wishes patient to be placed in long-term nursing facility  Pending cognitive eval  Case management following    History of malignant neoplasm of ampulla of Vater  Assessment & Plan  Noted    PAF (paroxysmal atrial fibrillation) ([facility])  Assessment & Plan  ·   Xarelto, hold until biopsy on Tuesday    Nausea  Assessment & Plan  · Probably due to metastatic disease  · Patient would like to eat a regular diet  · Aspiration precautions  · Speech therapy consult appreciated  · patient complains of nausea only associated with water intake  No nausea with ginger-saul  · Will start her on protonix    Lung mass  Assessment & Plan  · Pulmonary input appreciated  · After discussing with oncology, family decided to pursue biopsy of the lung  · currently in room air  · Scheduled for IR guided biopsy on Tuesday    ASHLEY (acute kidney injury) ([facility] [address] )  Assessment & Plan  · Probably due to dehydration in combination with hydrochlorothiazide use  · Resolved with fluid  · Hold hydrochlorothiazide for now, BP remains stable off HCTZ   May DC on discharge  · Discontinue fluid    Hypertension  Assessment & Plan  · Continue amlodipine    Hypothyroidism  Assessment & Plan  · Continue levothyroxine         VTE Pharmacologic Prophylaxis:   Pharmacologic: Pharmacologic VTE Prophylaxis contraindicated due to pending biopsy today  Mechanical VTE Prophylaxis in Place: Yes    Patient Centered Rounds: I have performed bedside rounds with nursing staff today  Education and Discussions with Family / Patient: discussed with son at bedside    Time Spent for Care: 30 minutes  More than 50% of total time spent on counseling and coordination of care as described above  Current Length of Stay: 5 day(s)    Current Patient Status: Inpatient   Certification Statement: The patient will continue to require additional inpatient hospital stay due to pending lung biopsy    Discharge Plan: penidng progress    Code Status: Level 3 - DNAR and DNI      Subjective:   Patient has complains of more nausea today  As per son, nausea only occurs with water intake  No nausea with ginger ale or any other food  Objective:     Vitals:   Temp (24hrs), Av 1 °F (36 7 °C), Min:97 8 °F (36 6 °C), Max:98 3 °F (36 8 °C)    Temp:  [97 8 °F (36 6 °C)-98 3 °F (36 8 °C)] 98 2 °F (36 8 °C)  HR:  [] 68  Resp:  [15-20] 15  BP: ()/(58-88) 98/58  SpO2:  [92 %-94 %] 94 %  Body mass index is 31 09 kg/m²  Input and Output Summary (last 24 hours): Intake/Output Summary (Last 24 hours) at 2021 1015  Last data filed at 2021 1833  Gross per 24 hour   Intake 120 ml   Output --   Net 120 ml       Physical Exam:     Physical Exam  Constitutional:       Appearance: She is well-developed  HENT:      Head: Normocephalic and atraumatic  Pulmonary:      Effort: Pulmonary effort is normal  No respiratory distress  Breath sounds: Normal breath sounds  Musculoskeletal:      Cervical back: Normal range of motion  Skin:     General: Skin is warm and dry              Labs:    Results from last 7 days   Lab Units 08/23/21  0805 08/19/21  1515   WBC Thousand/uL 11 96* 9 38   HEMOGLOBIN g/dL 10 7* 10 2*   HEMATOCRIT % 33 4* 32 1*   PLATELETS Thousands/uL 360 357   NEUTROS PCT %  --  80*   LYMPHS PCT %  --  11*   MONOS PCT %  --  8   EOS PCT %  --  1     Results from last 7 days   Lab Units 08/23/21  0805 08/20/21  0500   SODIUM mmol/L 135* 137   POTASSIUM mmol/L 3 9 3 2*   CHLORIDE mmol/L 104 105   CO2 mmol/L 27 27   BUN mg/dL 17 30*   CREATININE mg/dL 0 83 1 19   ANION GAP mmol/L 4 5   CALCIUM mg/dL 8 9 8 5   ALBUMIN g/dL  --  1 5*   TOTAL BILIRUBIN mg/dL  --  0 39   ALK PHOS U/L  --  152*   ALT U/L  --  14   AST U/L  --  17   GLUCOSE RANDOM mg/dL 92 94     Results from last 7 days   Lab Units 08/24/21  0913   INR  1 28*                      Recent Cultures (last 7 days):     Results from last 7 days   Lab Units 08/19/21  1655   URINE CULTURE  <10,000 cfu/ml        Last 24 Hours Medication List:   Current Facility-Administered Medications   Medication Dose Route Frequency Provider Last Rate    acetaminophen  650 mg Oral Q6H PRN Shu Barakat MD      amLODIPine  5 mg Oral Daily Zofia Cooper MD      atorvastatin  40 mg Oral Daily Zofia Cooper MD      bisacodyl  10 mg Rectal Daily PRN Perlita Hopper,       docusate sodium  100 mg Oral BID Shu Barakat MD      glycerin-hypromellose-  1 drop Both Eyes BID Shu Barakat MD      levothyroxine  100 mcg Oral Daily Zofia Cooper MD      lidocaine  1 patch Topical Daily Avel Villegas PA-C      melatonin  3 mg Oral HS Zofia Cooper MD      multivitamin-minerals  1 tablet Oral Daily Zofia Cooper MD      ondansetron  4 mg Intravenous Q4H PRN Zofia Cooper MD      pantoprazole  40 mg Oral Early Morning Shu Barakat MD      polyethylene glycol  17 g Oral Daily PRN Perlita Hopper DO      senna  1 tablet Oral HS Perlita Hopper DO          Today, Patient Was Seen By: Shu Barakat MD    ** Please Note: Dictation voice to text software may have been used in the creation of this document   **

## 2021-08-24 NOTE — SEDATION DOCUMENTATION
Left lower lobe lung mass biopsy performed by Dr Rosealee Dubin without complications  Tolerated well by patient, VSS

## 2021-08-24 NOTE — CASE MANAGEMENT
CM met with pt's sons to discuss aftercare plans  CM reviewed the different levels of care with them and per pt's son they would like the safest level of care that was the least restrictive and the best use of the pt's finances  Per pt's sons they would like to look into assisted living facilities  CM informed them about using [employer] [facility] [employer] to assist them with the search and they requested information  CM provided them with the number and reached out to [phone] [facility] on behalf of the pt's sons  CM will follow

## 2021-08-24 NOTE — INTERVAL H&P NOTE
Update: (This section must be completed if the H&P was completed greater than 24 hrs to procedure or admission)    H&P reviewed  After examining the patient, I find no changed to the H&P since it had been written  BP 98/58   Pulse 68   Temp 98 2 °F (36 8 °C)   Resp 15   Ht 5' 2" (1 575 m)   Wt 77 1 kg (170 lb)   SpO2 94%   BMI 31 09 kg/m²     Patient re-evaluated  Accept as history and physical     We will proceed CT-guided biopsy of lung mass      Girma Lee MD/August 24, 2021/2:34 PM

## 2021-08-24 NOTE — ASSESSMENT & PLAN NOTE
· Reported on CT abdomen and pelvis: concerning for primary lung cancer metastatic to liver  · Oncology consult appreciated  · After discussion with daughter, decision was made to obtain IR guided lung biopsy  · Scheduled for lung biopsy today  · Hold aspirin, hold Xarelto

## 2021-08-24 NOTE — DISCHARGE INSTRUCTIONS
Needle Biopsy of the Lung    WHAT YOU NEED TO KNOW:  A needle biopsy of the lung is a procedure to remove cells or tissue from your lung  You may have a fine needle aspiration biopsy (FNAB), or a core needle biopsy (CNB)  A FNAB is used to remove cells through a thin needle  CNB uses a thicker needle to remove lung tissue  The samples are collected and tested for inflammation, infection, or cancer  DISCHARGE INSTRUCTIONS:   Resume your normal diet  Small sips of flat soda will help with nausea  Limit your activity for 24 hours  Wound Care:      - Remove band aid in 24 hours      Contact Interventional Radiology at [phone] [facility] PATIENTS: Contact Interventional Radiology at [phone]) Fernandez Schneider PATIENTS: Contact Interventional Radiology at [phone]) if any of the following occur:    - You have a fever greater than 101*    - You cough up large amounts of bright red blood     - You have chest pain with breathing    - You have shortness of breath    -You have persistent nausea and vomiting    - You have pus, redness or swelling around your biopsy site    - You have questions or concerns about your condition or care

## 2021-08-24 NOTE — BRIEF OP NOTE (RAD/CATH)
INTERVENTIONAL RADIOLOGY PROCEDURE NOTE    Date: 8/24/2021    Procedure: CT-guided biopsy of left lung mass    Preoperative diagnosis:   1  ASHLEY (acute kidney injury) ([facility] [address] )    2  Hypercalcemia    3  Liver mass    4  Metastatic disease ([facility] [address] )    5  Lung mass    6  FTT (failure to thrive) in adult       Postoperative diagnosis: Same  Surgeon: Oksana Nunez MD     Assistant: None  No qualified resident was available  Blood loss: None    Specimens: 6 core biopsy specimens obtained and sent to pathology for further evaluation     Findings: CT-guided biopsy of left lung mass was performed  Please see radiology report for details  Complications: None immediate      Anesthesia: conscious sedation

## 2021-08-24 NOTE — ASSESSMENT & PLAN NOTE
· Probably due to dehydration in combination with hydrochlorothiazide use  · Resolved with fluid  · Hold hydrochlorothiazide for now, BP remains stable off HCTZ   May DC on discharge  · Discontinue fluid

## 2021-08-25 NOTE — CASE MANAGEMENT
CM was notified that pt' son was considering transitioning the pt to hospice  CM met with pt's son and reviewed hospice  Per pt's son he will discuss it with his brothers tonight and notify BRIGIDA tomorrow morning of their decision  BRIGIDA will follow

## 2021-08-25 NOTE — PLAN OF CARE
Problem: OCCUPATIONAL THERAPY ADULT  Goal: Performs self-care activities at highest level of function for planned discharge setting  See evaluation for individualized goals  Description: Treatment Interventions: ADL retraining, Functional transfer training, UE strengthening/ROM, Endurance training, Patient/family training, Equipment evaluation/education, Compensatory technique education, Continued evaluation, Activityengagement, Energy conservation          See flowsheet documentation for full assessment, interventions and recommendations  Note: Limitation: Decreased ADL status, Decreased endurance, Decreased self-care trans, Decreased high-level ADLs  Prognosis: Fair  Assessment: Pt seen for OT re-evaluation 2* change in fnxl status  Please refer to IE for more detailed information regarding pt's PLOF/social history  PTA, pt was I for ADLs and family A with IADLs  Upon re-evaluation, pt currently requires S with RW for transfers and mobility  Pt does report increased pain with LUE  Pt currently requires I eating and grooming, S UB ADLs, MIN A LB ADLs, and S toileting  Pt requires increased time to complete tasks, easily fatigued, decreased endurance in comparison to IE  Pt is limited at this time 2*: pain, endurance, activity tolerance, functional mobility, balance, functional standing tolerance, decreased I w/ ADLS/IADLS, strength and cognitive impairments  The following Occupational Performance Areas to address include: bathing/shower, toilet hygiene, dressing, health maintenance, functional mobility, community mobility and clothing management  Pt to benefit from immediate acute skilled OT to address above deficits, improve overall functional independence, maximize fnxl mobility and reduce caregiver burden    From OT standpoint, recommendation at time of d/c would be [facility] with skilled therapy services (per family, planning for pt to transition to [facility] for higher level of care/increased supervision; therefore, recommend pt have rehab services at facility)  Pt was left in chair with alarm on after session with all current needs met  The patient's raw score on the AM-PAC Daily Activity inpatient short form is 20, standardized score is 42 03, greater than 39 4  Patients at this level are likely to benefit from discharge to home  Please refer to the recommendation of the Occupational Therapist for safe discharge planning       OT Discharge Recommendation: Home with home health rehabilitation ([facility] with rehab services)  OT - OK to Discharge: Yes (when medically stable)

## 2021-08-25 NOTE — PROGRESS NOTES
Thomas Kruger visited Pt      08/25/21 1500   Clinical Encounter Type   Visited With Patient   [facility] Encounters   [facility] Needs Prayer   Sacramental Encounters   Communion Given Indicator Yes   Sacrament of Sick-Anointing Anointed

## 2021-08-25 NOTE — ASSESSMENT & PLAN NOTE
· Probably due to metastatic disease  · Patient would like to eat a regular diet  · Aspiration precautions  · Speech therapy consult appreciated  · patient complains of nausea only associated with water intake  No nausea with ginger-saul     ·  start her on protonix  · May follow up with outpatient Gastroenterology for EGD

## 2021-08-25 NOTE — PHYSICAL THERAPY NOTE
Physical Therapy Re-Evaluation     Patient's Name: Lorraine Espinosa    Admitting Diagnosis  Hypercalcemia [E83 52]  Poor appetite [R63 0]  Lung mass [R91 8]  Liver mass [R16 0]  Metastatic disease ([facility] [address] ) [C79 9]  FTT (failure to thrive) in adult [R62 7]  ASHLEY (acute kidney injury) ([facility] [address] ) [N17 9]    Problem List  Patient Active Problem List   Diagnosis    Hypothyroidism    Hypertension    Irritable bowel syndrome    History of stroke    Atheroma of artery    Transaminitis    Ampulla of Vater obstruction syndrome    Hyperkalemia    ASHLEY (acute kidney injury) ([facility] [address] )    Metastatic disease ([facility] [address] )    Lung mass    Nausea    PAF (paroxysmal atrial fibrillation) (HCC)    History of malignant neoplasm of ampulla of Vater    Aortic stenosis    Social problem       Past Medical History  Past Medical History:   Diagnosis Date    Atheroma of artery     aorta    History of stroke     bifrontal CVA    Hypertension     Hypothyroidism     Intrahepatic bile duct stones     Irritable bowel syndrome     Murmur     Stroke [facility])        Past Surgical History  Past Surgical History:   Procedure Laterality Date    APPENDECTOMY      CARDIAC SURGERY      reveal linq cardiac monitor 5-26-15    CARPAL TUNNEL RELEASE      CHOLECYSTECTOMY      ERCP N/A 3/21/2016    Procedure: ENDOSCOPIC RETROGRADE CHOLANGIOPANCREATOGRAPHY (ERCP); Surgeon: Beatriz Atkinson MD;  [facility]; Service:     IR BIOPSY LUNG  8/24/2021    IL EGD TRANSORAL BIOPSY SINGLE/MULTIPLE N/A 3/21/2016    Procedure: RADIAL ENDOSCOPIC U/S;  Surgeon: Beatriz Atkinson MD;  [facility]; Service: Gastroenterology    IL ESOPHAGOGASTRODUODENOSCOPY TRANSORAL DIAGNOSTIC N/A 5/12/2016    Procedure: ESOPHAGOGASTRODUODENOSCOPY (EGD); Surgeon: Beatriz Atkinson MD;  [facility];   Service: Gastroenterology    TONSILLECTOMY          08/25/21 1347   PT Last Visit   PT Visit Date 08/25/21   Note Type   Note type Re-Evaluation   Pain Assessment   Pain Assessment Tool FLACC   Pain Location/Orientation Orientation: Left; Location: Rib Cage   Patient's Stated Pain Goal No pain   Hospital Pain Intervention(s) Repositioned; Ambulation/increased activity   Pain Rating: FLACC (Rest) - Face 0   Pain Rating: FLACC (Rest) - Legs 0   Pain Rating: FLACC (Rest) - Activity 0   Pain Rating: FLACC (Rest) - Cry 1   Pain Rating: FLACC (Rest) - Consolability 1   Score: FLACC (Rest) 2   Pain Rating: FLACC (Activity) - Face 1   Pain Rating: FLACC (Activity) - Legs 0   Pain Rating: FLACC (Activity) - Activity 1   Pain Rating: FLACC (Activity) - Cry 1   Pain Rating: FLACC (Activity) - Consolability 1   Score: FLACC (Activity) 4   Home Living   Type of Home Apartment   Home Layout One level   Bathroom Shower/Tub Walk-in shower   Bathroom Toilet Standard   Bathroom Equipment Grab bars in shower; Shower chair   [facility] Function   Level of [place] Independent with ADLs and functional mobility   Lives With Alone   Receives Help From Family   ADL Assistance Independent   IADLs Needs assistance   Falls in the last 6 months 0   Vocational Retired   Restrictions/Precautions   [facility] Bearing Precautions Per Order No   Other Precautions Chair Alarm; Bed Alarm; Fall Risk;Pain   General   Family/Caregiver Present No   Cognition   Arousal/Participation Cooperative   Attention Attends with cues to redirect   Orientation Level Oriented to place;Oriented to person   Following Commands Follows one step commands without difficulty   Comments Pt pleasant and cooperative to participate in therapy session      RLE Assessment   RLE Assessment   (grossly 4-/5 )   LLE Assessment   LLE Assessment   (grossly 4-/5 )   Bed Mobility   Supine to Sit Unable to assess   Sit to Supine Unable to assess   Additional Comments Pt sitting OOB in bedside chair upon arrival  Pt sitting OOB in bedside chair with chair alarm on with all needs within reach at the end of therapy session  Transfers   Sit to Stand 5  Supervision   Additional items Assist x 1; Increased time required;Verbal cues   Stand to Sit 5  Supervision   Additional items Assist x 1; Increased time required;Verbal cues   Toilet transfer   (CGA)   Additional items Assist x 1; Increased time required;Standard toilet  (+ GBs )   Additional Comments Transfers with RW; cues for hand placement and sequneicng  Ambulation/Elevation   Gait pattern Excessively slow; Short stride; Foward flexed;Decreased foot clearance   Gait Assistance 5  Supervision   Additional items Assist x 1;Verbal cues   Assistive Device Rolling walker   Distance 2 x 30ft    Balance   Static Sitting Fair +   Dynamic Sitting Fair   Static Standing Fair   Dynamic Standing Fair -   Ambulatory Fair -   Activity Tolerance   Activity Tolerance Patient limited by fatigue;Patient limited by pain   Medical Staff Made Aware OT; co-re-eval performed this date 2* increased medical complexity and multiple co-morbidities    Nurse Made Aware RN cleared pt to participate in therapy session    Assessment   Prognosis Good   Problem List Decreased endurance; Impaired balance;Decreased mobility   Assessment Pt seen for PT re-evaluation 2* change in functional status  Pt with active PT eval/treat orders  Pt is a 80 y o  female who was [facility] to [facility] on 8/19/2021 with metastatic disease  Pt  has a past medical history of Atheroma of artery, History of stroke, Hypertension, Hypothyroidism, Intrahepatic bile duct stones, Irritable bowel syndrome, Murmur, and Stroke ([address] )  Pt resides alone in 1 level apartment and was independent prior to hospital admission  Pt currently has limitations in strength, balance, endurance, and overall functional mobility  Pt requires assist of 1 for all mobility performed this date  Pt performed STS from bedside chair at close S level; cues for hand placement and sequencing required   Increased A required for lower surfaces such as standard toilet  Pt ambulated into hallway with RW and S level  Pt with increased fatigue and pain- deferring further mobility  Pt was left sitting upright in bedside chair with chair alarm on at the end of PT session with all needs in reach  Pt would benefit from continued PT services while in hospital to address remaining limitations  PT to continue to follow pt and recommends home with HHPT and increased social support  Pt and family are considering transitioning to [facility], recommendation would be group home with rehab services at facility  Please also refer to OT cog assessment  The patient's AM-PAC Basic Mobility Inpatient Short Form Raw Score is 19, Standardized Score is 42 48  A standardized score less than 42 9 suggests the patient may benefit from discharge to post-acute rehabilitation services  Please also refer to the recommendation of the Physical Therapist for safe discharge planning  Barriers to Discharge Decreased caregiver support   Goals   Patient Goals to have less pain    STG Expiration Date 09/08/21   Short Term Goal #1 STG 1  Pt will be able to perform bed mobility with mod I in order to improve overall functional mobility and assist in safe d/c  STG 2  Pt will be able to perform functional transfer with mod I in order to improve overall functional mobility and assist in safe d/c  STG 3  Pt will be able to ambulate at least 150 feet with least restrictive device with mod I A in order to improve overall functional mobility and assist in safe d/c  STG 4  Pt will improve sitting/standing static/dynamic balance 1 grade in order to improve functional mobility and assist in safe d/c  STG 5  Pt will improve LE strength by one grade in order to improve functional mobility and assist in safe d/c     PT Treatment Day 0   Plan   Treatment/Interventions Functional transfer training;LE strengthening/ROM; Endurance training;Patient/family training;Equipment eval/education;Gait training;Spoke to nursing;Spoke to case management;OT   PT Frequency 2-3x/wk   Recommendation   PT Discharge Recommendation Home with home health rehabilitation  ([facility] with rehab services )   Equipment Recommended [facility] Recommended Wheeled walker   PT - OK to Discharge Yes  (once medically cleared )   Additional Comments Please refer to OT cog assessment    AM-PAC Basic Mobility Inpatient   Turning in Bed Without Bedrails 4   Lying on Back to Sitting on Edge of Flat Bed 3   Moving Bed to Chair 3   Standing Up From Chair 3   Walk in Room 3   Climb 3-5 Stairs 3   Basic Mobility Inpatient Raw Score 19   Basic Mobility Standardized Score 42 48           Belkis Garcia, PT, DPT

## 2021-08-25 NOTE — PLAN OF CARE
Problem: PHYSICAL THERAPY ADULT  Goal: Performs mobility at highest level of function for planned discharge setting  See evaluation for individualized goals  Description: Treatment/Interventions: Functional transfer training, LE strengthening/ROM, Endurance training, Patient/family training, Equipment eval/education, Gait training, Spoke to nursing, Spoke to case management, OT  Equipment Recommended: Teodora Redder       See flowsheet documentation for full assessment, interventions and recommendations  Note: Prognosis: Good  Problem List: Decreased endurance, Impaired balance, Decreased mobility  Assessment: Pt seen for PT re-evaluation 2* change in functional status  Pt with active PT eval/treat orders  Pt is a 80 y o  female who was admitted to [facility] on 8/19/2021 with metastatic disease  Pt  has a past medical history of Atheroma of artery, History of stroke, Hypertension, Hypothyroidism, Intrahepatic bile duct stones, Irritable bowel syndrome, Murmur, and Stroke ([facility] [address] )  Pt resides alone in 1 level apartment and was independent prior to hospital admission  Pt currently has limitations in strength, balance, endurance, and overall functional mobility  Pt requires assist of 1 for all mobility performed this date  Pt performed STS from bedside chair at close S level; cues for hand placement and sequencing required  Increased A required for lower surfaces such as standard toilet  Pt ambulated into hallway with RW and S level  Pt with increased fatigue and pain- deferring further mobility  Pt was left sitting upright in bedside chair with chair alarm on at the end of PT session with all needs in reach  Pt would benefit from continued PT services while in hospital to address remaining limitations  PT to continue to follow pt and recommends home with HHPT and increased social support  Pt and family are considering transitioning to [facility], recommendation would be [facility] with rehab services at facility   Please also refer to OT cog assessment  The patient's AM-PAC Basic Mobility Inpatient Short Form Raw Score is 19, Standardized Score is 42 48  A standardized score less than 42 9 suggests the patient may benefit from discharge to post-acute rehabilitation services  Please also refer to the recommendation of the Physical Therapist for safe discharge planning  Barriers to Discharge: Decreased caregiver support        PT Discharge Recommendation: Home with home health rehabilitation ([facility] with rehab services )     PT - OK to Discharge: Yes (once medically cleared )    See flowsheet documentation for full assessment

## 2021-08-25 NOTE — ASSESSMENT & PLAN NOTE
· Reported on CT abdomen and pelvis: concerning for primary lung cancer metastatic to liver  · Oncology consult appreciated  · After discussion with daughter, decision was made to obtain IR guided lung biopsy  · Patient had a lung biopsy completed 08/24/2021  · On presentation, lung mass measured 7 5 cm, yesterday measured at 10 cm with a new axillary mass  · Possibly an aggressive disease  · Hold aspirin, hold Xarelto until decision made regarding hospice  · Continues to complain of left-sided chest pain, no EKG changes, negative troponin  It is cancer related pain  · Palliative care following for pain control  · Discussed with son at bedside- does not wish to pursue any chemotherapy or radiation therapy  · Considering hospice   Will have family meeting javi

## 2021-08-25 NOTE — ASSESSMENT & PLAN NOTE
Patient currently lives alone by herself  PT and OT consult appreciated  Although they do not recommend any rehab however recommends cognitive evaluation  Son does not feel patient is safe to return to home as she will not have any supervision  He wishes patient to be placed in long-term nursing facility  Case management following: currently patient's son is looking into assisted living facilities   If plan to pursue hospice, let CM know

## 2021-08-25 NOTE — PROGRESS NOTES
[address] [facility]  Progress Note - Janina De 10/5/1928, 80 y o  female MRN: [id number]  Unit/Bed#: [facility] 919-01 Encounter: [id number]  Primary Care Provider: Kellee Hicks   Date and time admitted to hospital: 8/19/2021  1:53 PM    * Metastatic disease [facility])  Assessment & Plan  · Reported on CT abdomen and pelvis: concerning for primary lung cancer metastatic to liver  · Oncology consult appreciated  · After discussion with daughter, decision was made to obtain IR guided lung biopsy  · Patient had a lung biopsy completed 08/24/2021  · On presentation, lung mass measured 7 5 cm, yesterday measured at 10 cm with a new axillary mass  · Possibly an aggressive disease  · Hold aspirin, hold Xarelto until decision made regarding hospice  · Continues to complain of left-sided chest pain, no EKG changes, negative troponin  It is cancer related pain  · Palliative care following for pain control  · Discussed with son at bedside- does not wish to pursue any chemotherapy or radiation therapy  · Considering hospice  [facility]    [facility] problem  Assessment & Plan  Patient currently lives alone by herself  PT and OT consult appreciated  Although they do not recommend any rehab however recommends cognitive evaluation  Son does not feel patient is safe to return to home as she will not have any supervision  He wishes patient to be placed in long-term nursing facility  Case management following: currently patient's son is looking into assisted living facilities  If plan to pursue hospice, let CM know    Aortic stenosis  Assessment & Plan  known moderate aortic stenosis  Last echo was 2015  Follows with OP cardio Dr Virgil Griffiths office for PAF    PAF (paroxysmal atrial fibrillation) [facility])  Assessment & Plan  ·   Xarelto, was on hold due to biopsy    We will resume if no hospice is pursued    Nausea  Assessment & Plan  · Probably due to metastatic disease  · Patient would like to eat a regular diet  · Aspiration precautions  · Speech therapy consult appreciated  · patient complains of nausea only associated with water intake  No nausea with ginger-saul  ·  start her on protonix  · May follow up with outpatient Gastroenterology for EGD    Lung mass  Assessment & Plan  · Pulmonary input appreciated  · After discussing with oncology, family decided to pursue biopsy of the lung  · currently in room air  · Status post biopsy yesterday    ASHLEY (acute kidney injury) ([address] )  Assessment & Plan  · Probably due to dehydration in combination with hydrochlorothiazide use  · Resolved with fluid  · Hold hydrochlorothiazide for now, BP remains stable off HCTZ  May DC on discharge  · Discontinue fluid    Hypertension  Assessment & Plan  · Continue amlodipine    Hypothyroidism  Assessment & Plan  · Continue levothyroxine         VTE Pharmacologic Prophylaxis:   Pharmacologic: Pharmacologic VTE Prophylaxis contraindicated due to pending family's decision regarding hospice  Mechanical VTE Prophylaxis in Place: No    Patient Centered Rounds: I have performed bedside rounds with nursing staff today  Discussions with Specialists or Other Care Team Provider: discussed with CM, PT    Education and Discussions with Family / Patient: patient's son at bedside       Current Length of Stay: 6 day(s)    Current Patient Status: Inpatient   Certification Statement: The patient will continue to require additional inpatient hospital stay due to pending decision regarding hospice, and placement in assisted living    Discharge Plan: pending progress    Code Status: Level 3 - DNAR and DNI      Subjective:   Patient continues to complain of left sided chest pain  Also has occasional nausea, with drinking water  Sitting up on the sofa  Not in distress   pleasant    Objective:     Vitals:   Temp (24hrs), Av 7 °F (36 5 °C), Min:97 1 °F (36 2 °C), Max:98 5 °F (36 9 °C)    Temp:  [97 1 °F (36 2 °C)-98 5 °F (36 9 °C)] 97 3 °F (36 3 °C)  HR:  [] 63  Resp:  [16-20] 18  BP: (121-127)/(53-76) 123/55  SpO2:  [93 %-96 %] 95 %  Body mass index is 31 09 kg/m²  Input and Output Summary (last 24 hours): Intake/Output Summary (Last 24 hours) at 8/25/2021 1642  Last data filed at 8/24/2021 2223  Gross per 24 hour   Intake 240 ml   Output 0 ml   Net 240 ml       Physical Exam:     Physical Exam  Constitutional:       Appearance: She is well-developed  HENT:      Head: Normocephalic and atraumatic  Pulmonary:      Effort: Pulmonary effort is normal  No respiratory distress  Breath sounds: Normal breath sounds  Musculoskeletal:      Cervical back: Normal range of motion  Skin:     General: Skin is warm and dry            Labs:    Results from last 7 days   Lab Units 08/25/21  0450 08/19/21  1515   WBC Thousand/uL 10 79* 9 38   HEMOGLOBIN g/dL 9 5* 10 2*   HEMATOCRIT % 29 9* 32 1*   PLATELETS Thousands/uL 316 357   NEUTROS PCT %  --  80*   LYMPHS PCT %  --  11*   MONOS PCT %  --  8   EOS PCT %  --  1     Results from last 7 days   Lab Units 08/25/21  0450 08/20/21  0500   SODIUM mmol/L 134* 137   POTASSIUM mmol/L 3 4* 3 2*   CHLORIDE mmol/L 102 105   CO2 mmol/L 26 27   BUN mg/dL 17 30*   CREATININE mg/dL 0 73 1 19   ANION GAP mmol/L 6 5   CALCIUM mg/dL 8 6 8 5   ALBUMIN g/dL  --  1 5*   TOTAL BILIRUBIN mg/dL  --  0 39   ALK PHOS U/L  --  152*   ALT U/L  --  14   AST U/L  --  17   GLUCOSE RANDOM mg/dL 92 94     Results from last 7 days   Lab Units 08/24/21  0913   INR  1 28*                Recent Cultures (last 7 days):     Results from last 7 days   Lab Units 08/19/21  1655   URINE CULTURE  <10,000 cfu/ml        Last 24 Hours Medication List:   Current Facility-Administered Medications   Medication Dose Route Frequency Provider Last Rate    acetaminophen  975 mg Oral Q8H Mervin Hood MD      amLODIPine  5 mg Oral Daily Lynne Lebron MD      atorvastatin  40 mg Oral Daily MD Jacky Rossi bisacodyl  10 mg Rectal Daily PRN Perlita Hopper DO      docusate sodium  100 mg Oral BID Candice Nunez MD      glycerin-hypromellose-  1 drop Both Eyes BID Candice Nunez MD      levothyroxine  100 mcg Oral Daily Tawnya Stout MD      lidocaine  1 patch Topical Daily Daryl Kingston PA-C      melatonin  3 mg Oral HS Tawnya Stout MD      multivitamin-minerals  1 tablet Oral Daily Tawnya Stout MD      ondansetron  4 mg Intravenous Q4H PRN Tawnya Stout MD      oxyCODONE  2 5 mg Oral Q4H PRN July Uriarte MD      pantoprazole  40 mg Oral Early Morning Candice Nunez MD      polyethylene glycol  17 g Oral Daily PRN Prelita Hopper DO      senna  1 tablet Oral HS Perlita Hopper DO          Today, Patient Was Seen By: Candice Nunez MD    ** Please Note: Dictation voice to text software may have been used in the creation of this document   **

## 2021-08-25 NOTE — ASSESSMENT & PLAN NOTE
· Pulmonary input appreciated  · After discussing with oncology, family decided to pursue biopsy of the lung  · currently in room air  · Status post biopsy yesterday

## 2021-08-25 NOTE — PROGRESS NOTES
Progress note - Palliative and Supportive Care   Lorrie John 80 y o  female [id number]    Patient Active Problem List   Diagnosis    Hypothyroidism    Hypertension    Irritable bowel syndrome    History of stroke    Atheroma of artery    Transaminitis    Ampulla of Vater obstruction syndrome    Hyperkalemia    ASHLEY (acute kidney injury) ([facility] [address] )    Metastatic disease (HCC)    Lung mass    Nausea    PAF (paroxysmal atrial fibrillation) (HCC)    History of malignant neoplasm of ampulla of Vater    Aortic stenosis    Social problem     Active issues specifically addressed today include:   Lung mass  Metastatic  Deconditioning  Goals of care  Palliative care encounter      Plan:  1  Symptom management -    - cancer related pain:  Tylenol 650 mg q 6 hours p r n  Which helps with pain  Oxycodone 2 5 mg q 4 hours p r n  For severe pain  Will schedule Tylenol and increase dose to 975 mg t i d  Patient aware she can ask for Percocet as needed for pain  Will continue to evaluate    2  Goals - DNR/DNI   - son reports that patient has been very clear regarding goals of care  Due to poor prognosis, he is considering hospice at [facility] facility  Case management following  Code Status: DNR/DNI - Level 3   Decisional apparatus:  Patient is competent on my exam today  If competence is lost, patient's substitute decision maker would default to children by [place] 169  Advance Directive / Living Will / POLST:  Yes, in chart    Interval history:       Patient seen and examined this morning  Patient looks comfortable, eating lunch  She reports she doesn't like hospital food and theres no salt in it  She reports pain in the L intercostal side  Reports pain is intermittent, improves slightly with tylenol  She reports sometimes it takes some time for nurses to bring pain meds and pain worsens  Advised that medication will be scheduled to improve discomfort   She was made aware about Percocet if pain is moderate or severe  Son, in the room reports primary team gave very poor prognosis of disease and is considering hospice at assisted living facility  CM has been following with family  He reports he will be looking at 2 places tonight  HE feels overwhelmed and sad but reports he wants mother to be as comfortable and without pain as possible  MEDICATIONS / ALLERGIES:     current meds:   Current Facility-Administered Medications   Medication Dose Route Frequency    acetaminophen (TYLENOL) tablet 650 mg  650 mg Oral Q6H PRN    amLODIPine (NORVASC) tablet 5 mg  5 mg Oral Daily    atorvastatin (LIPITOR) tablet 40 mg  40 mg Oral Daily    bisacodyl (DULCOLAX) rectal suppository 10 mg  10 mg Rectal Daily PRN    docusate sodium (COLACE) capsule 100 mg  100 mg Oral BID    glycerin-hypromellose- (ARTIFICIAL TEARS) ophthalmic solution 1 drop  1 drop Both Eyes BID    levothyroxine tablet 100 mcg  100 mcg Oral Daily    lidocaine (LIDODERM) 5 % patch 1 patch  1 patch Topical Daily    melatonin tablet 3 mg  3 mg Oral HS    multivitamin-minerals (CENTRUM) tablet 1 tablet  1 tablet Oral Daily    ondansetron (ZOFRAN) injection 4 mg  4 mg Intravenous Q4H PRN    oxyCODONE (ROXICODONE) IR tablet 2 5 mg  2 5 mg Oral Q4H PRN    pantoprazole (PROTONIX) EC tablet 40 mg  40 mg Oral Early Morning    polyethylene glycol (MIRALAX) packet 17 g  17 g Oral Daily PRN    senna (SENOKOT) tablet 8 6 mg  1 tablet Oral HS       Allergies   Allergen Reactions    Tetanus Toxoids        OBJECTIVE:    Physical Exam  Physical Exam  Constitutional:       Appearance: She is ill-appearing  Interventions: Nasal cannula in place  Eyes:      General:         Right eye: No discharge  Left eye: No discharge  Conjunctiva/sclera: Conjunctivae normal    Pulmonary:      Effort: Pulmonary effort is normal  No respiratory distress  Chest:      Chest wall: Tenderness present  Abdominal:      Palpations: Abdomen is soft  Skin:     General: Skin is warm  Capillary Refill: Capillary refill takes less than 2 seconds  Coloration: Skin is pale  Neurological:      Mental Status: She is alert and oriented to person, place, and time  Psychiatric:         Mood and Affect: Mood normal          Behavior: Behavior normal  Behavior is cooperative  Lab Results:   CBC:   Lab Results   Component Value Date    WBC 10 79 (H) 08/25/2021    HGB 9 5 (L) 08/25/2021    HCT 29 9 (L) 08/25/2021    MCV 84 08/25/2021     08/25/2021    MCH 26 5 (L) 08/25/2021    MCHC 31 8 08/25/2021    RDW 16 0 (H) 08/25/2021    MPV 12 0 08/25/2021   , BMP:  Lab Results   Component Value Date    SODIUM 134 (L) 08/25/2021    K 3 4 (L) 08/25/2021     08/25/2021    CO2 26 08/25/2021    BUN 17 08/25/2021    CREATININE 0 73 08/25/2021    GLUC 92 08/25/2021    CALCIUM 8 6 08/25/2021    AGAP 6 08/25/2021    EGFR 72 08/25/2021       EKG, Pathology, and Other Studies: Pending biopsy of left lung mass    Counseling / Coordination of Care    Total floor / unit time spent today 30 minutes  Greater than 50% of total time was spent with the patient and / or family counseling and / or coordination of care   A description of the counseling / coordination of care:  goals of care, symptom management, social emotional support

## 2021-08-25 NOTE — OCCUPATIONAL THERAPY NOTE
Occupational Therapy Re-Evaluation     Patient Name: Dre Barkley  MZCPK'I Date: 8/25/2021  Problem List  Principal Problem:    Metastatic disease ([facility] )  Active Problems:    Hypothyroidism    Hypertension    ASHLEY (acute kidney injury) ([facility] [address] )    Lung mass    Nausea    [facility] )    History of malignant neoplasm of ampulla of Vater    Aortic stenosis    Social problem    Past Medical History  Past Medical History:   Diagnosis Date    Atheroma of artery     aorta    History of stroke     bifrontal CVA    Hypertension     Hypothyroidism     Intrahepatic bile duct stones     Irritable bowel syndrome     Murmur     Stroke [facility])      Past Surgical History  Past Surgical History:   Procedure Laterality Date    APPENDECTOMY      CARDIAC SURGERY      reveal linq cardiac monitor 5-26-15    CARPAL TUNNEL RELEASE      CHOLECYSTECTOMY      ERCP N/A 3/21/2016    Procedure: ENDOSCOPIC RETROGRADE CHOLANGIOPANCREATOGRAPHY (ERCP); Surgeon: Nando Torres MD;  [facility]: [facility]; Service:     IR BIOPSY LUNG  8/24/2021    [place] EGD TRANSORAL BIOPSY SINGLE/MULTIPLE N/A 3/21/2016    Procedure: RADIAL ENDOSCOPIC U/S;  Surgeon: Nando Torres MD;  [facility]: [facility]; Service: Gastroenterology    [place] ESOPHAGOGASTRODUODENOSCOPY TRANSORAL DIAGNOSTIC N/A 5/12/2016    Procedure: ESOPHAGOGASTRODUODENOSCOPY (EGD); Surgeon: Nando Torres MD;  [facility]: [facility]; Service: Gastroenterology    TONSILLECTOMY             08/25/21 1346   OT Last Visit   OT Visit Date 08/25/21   Note Type   Note type Re-Evaluation   Restrictions/Precautions   Weight Bearing Precautions Per Order No   Other Precautions Chair Alarm; Bed Alarm; Fall Risk;Pain   Pain Assessment   Pain Assessment Tool FLACC   Pain Location/Orientation Orientation: Left; Location: Rib Cage   Hospital Pain Intervention(s) Repositioned;Cold applied; Ambulation/increased activity   Pain Rating: FLACC (Rest) - Face 0   Pain Rating: FLACC (Rest) - Legs 0   Pain Rating: FLACC (Rest) - Activity 0   Pain Rating: FLACC (Rest) - Cry 1   Pain Rating: FLACC (Rest) - Consolability 1   Score: FLACC (Rest) 2   Pain Rating: FLACC (Activity) - Face 1   Pain Rating: FLACC (Activity) - Legs 0   Pain Rating: FLACC (Activity) - Activity 1   Pain Rating: FLACC (Activity) - Cry 1   Pain Rating: FLACC (Activity) - Consolability 1   Score: FLACC (Activity) 4   Home Living   Type of Home Apartment   Home Layout One level   Additional Comments Please refer to IE    Prior Function   Level of [place] Independent with ADLs and functional mobility   Lives With Alone   Receives Help From Family   ADL Assistance Independent   IADLs Needs assistance   Comments Please refer to IE    Lifestyle   Autonomy PTA, pt reports being I with ADLs and some IADLs, son and DIL assist with higher level IADLs   Reciprocal Relationships Family    Service to 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[employer] Going to the [employer]    Psychosocial   Psychosocial (WDL) WDL   Subjective   Subjective "it hurts when I raise my arm"   ADL   Where Assessed Chair   Eating Assistance 7  Independent   Grooming Assistance 7  Independent   UB Bathing Assistance 5  Supervision/Setup   LB Bathing Assistance 4  Minimal Assistance   UB Dressing Assistance 6  Modified independent   LB Dressing Assistance 4  Minimal Assistance   Toileting Assistance  5  Supervision/Setup   Bed Mobility   Supine to Sit Unable to assess   Sit to Supine Unable to assess   Additional Comments Pt seated OOB in chair upon arrival    Transfers   Sit to Stand 5  Supervision   Additional items Increased time required   Stand to Sit 5  Supervision   Additional items Increased time required   Toilet transfer   (CGA)   Additional items Increased time required   Functional Mobility   Functional Mobility 5  Supervision   Additional Comments [facility]   Additional items Rolling walker Balance   Static Sitting Fair +   Dynamic Sitting Fair   Static Standing Fair   Dynamic Standing Fair -   Ambulatory Fair -   Activity Tolerance   Activity Tolerance Patient limited by pain; Patient limited by fatigue   Medical Staff Made Aware PT Zina Porter    Nurse Made Aware yes    RUE Assessment   RUE Assessment WFL   LUE Assessment   LUE Assessment WFL   Hand Function   Gross Motor Coordination Functional   Fine Motor Coordination Functional   Sensation   Light Touch No apparent deficits   Vision-Basic Assessment   Current Vision Wears glasses all the time   Vision - Complex Assessment   Ocular Range of Motion WFL   Head Position WDL   Tracking Able to track stimulus in all quads without difficulty   Cognition   Arousal/Participation Alert; Responsive; Cooperative   Attention Attends with cues to redirect   Orientation Level Oriented to place;Oriented to person   Following Commands Follows one step commands without difficulty   Comments Pt pleasant and cooperative t/o session    Cognition Assessment Tools ACLS  (administered during current admission )   Score 3 6   Assessment   Limitation Decreased ADL status; Decreased endurance;Decreased self-care trans;Decreased high-level ADLs   Prognosis Fair   Assessment Pt seen for OT re-evaluation 2* change in fnxl status  Please refer to IE for more detailed information regarding pt's PLOF/social history  PTA, pt was I for ADLs and family A with IADLs  Upon re-evaluation, pt currently requires S with RW for transfers and mobility  Pt does report increased pain with LUE  Pt currently requires I eating and grooming, S UB ADLs, MIN A LB ADLs, and S toileting  Pt requires increased time to complete tasks, easily fatigued, decreased endurance in comparison to IE  Pt is limited at this time 2*: pain, endurance, activity tolerance, functional mobility, balance, functional standing tolerance, decreased I w/ ADLS/IADLS, strength and cognitive impairments  The following Occupational Performance Areas to address include: bathing/shower, toilet hygiene, dressing, health maintenance, functional mobility, community mobility and clothing management  Pt to benefit from immediate acute skilled OT to address above deficits, improve overall functional independence, maximize fnxl mobility and reduce caregiver burden  From OT standpoint, recommendation at time of d/c would be halfway with skilled therapy services (per family, planning for pt to transition to [facility] for higher level of care/increased supervision; therefore, recommend pt have rehab services at facility)  Pt was left in chair with alarm on after session with all current needs met  The patient's raw score on the AM-PAC Daily Activity inpatient short form is 20, standardized score is 42 03, greater than 39 4  Patients at this level are likely to benefit from discharge to home  Please refer to the recommendation of the Occupational Therapist for safe discharge planning  Goals   Patient Goals to get rid of her pain    LTG Time Frame 10-14   Plan   Treatment Interventions ADL retraining;Functional transfer training;UE strengthening/ROM; Endurance training;Patient/family training;Equipment evaluation/education; Compensatory technique education;Continued evaluation; Activityengagement; Energy conservation   Goal Expiration Date 09/08/21   OT Frequency 2-3x/wk   Recommendation   OT Discharge Recommendation Home with home health rehabilitation  (halfway with rehab services)   OT - OK to Discharge Yes  (when medically stable)   Additional Comments  Pt family planning for pt to transition to [facility] as pt requires 24/7 supervision   Recommend pt receive skilled OT services at facility   AM-PAC Daily Activity Inpatient   Lower Body Dressing 3   Bathing 3   Toileting 3   Upper Body Dressing 3   Grooming 4   Eating 4   Daily Activity Raw Score 20   Daily Activity Standardized Score (Calc for Raw Score >=11) 42 03   AM-PAC Applied Cognition Inpatient   Following a Speech/Presentation 3   Understanding Ordinary Conversation 4   Taking Medications 3   Remembering Where Things Are Placed or Put Away 2   Remembering List of 4-5 Errands 2   Taking Care of Complicated Tasks 2   Applied Cognition Raw Score 16   Applied Cognition Standardized Score 35 03          GOALS    1) Pt will increase activity tolerance to G for 30 min txment sessions    2) Pt will complete UB dressing/self care/bathing w/ mod I using adaptive device and DME as needed    3) Pt will complete LB dressing/self care/bathing w/ mod I using adaptive device and DME as needed    4) Pt will complete toileting w/ mod I w/ G hygiene/thoroughness using DME as needed    5) Pt will improve functional transfers to Mod I on/off all surfaces using DME as needed w/ G balance/safety     6) Pt will improve functional mobility during ADL/IADL/leisure tasks to Mod I using DME as needed w/ G balance/safety     7) Pt will demonstrate G carryover of pt/caregiver education and training as appropriate      8) Pt will demonstrate 100% carryover of energy conservation techniques t/o functional I/ADL/leisure tasks w/o cues s/p skilled education      German Childs MS, OTR/L

## 2021-08-26 NOTE — ASSESSMENT & PLAN NOTE
· Probably due to metastatic disease  · Patient would like to eat a regular diet  · Aspiration precautions  · Speech therapy consult appreciated  · patient complains of nausea only associated with water intake  No nausea with ginger-saul     ·  start her on protonix  · Prn zofran

## 2021-08-26 NOTE — PROGRESS NOTES
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  She has some left-sided chest pain  She has no complaint of shortness of breath or cough  She appeared to be stable  Objective:    General Appearance:    Alert, oriented        Eyes:    PERRL   Ears:    Normal external ear canals, both ears   Nose:   Nares normal, septum midline   Throat:   Mucosa moist  Pharynx without injection  Neck:   Supple       Lungs:     Clear to auscultation bilaterally   Chest Wall:    No tenderness or deformity    Heart:    Regular rate and rhythm       Abdomen:     Soft, non-tender, bowel sounds +, no organomegaly           Extremities:   Extremities no cyanosis or edema       Skin:   no rash or icterus      Lymph nodes:   Cervical, supraclavicular, and axillary nodes normal   Neurologic:   CNII-XII intact, normal strength, sensation and reflexes     throughout        Recent Results (from the past 48 hour(s))   Troponin I    Collection Time: 08/24/21 11:13 AM   Result Value Ref Range    Troponin I 0 04 <=0 04 ng/mL   Basic metabolic panel    Collection Time: 08/24/21 11:13 AM   Result Value Ref Range    Sodium 135 (L) 136 - 145 mmol/L    Potassium 3 6 3 5 - 5 3 mmol/L    Chloride 103 100 - 108 mmol/L    CO2 27 21 - 32 mmol/L    ANION GAP 5 4 - 13 mmol/L    BUN 15 5 - 25 mg/dL    Creatinine 0 77 0 60 - 1 30 mg/dL    Glucose 89 65 - 140 mg/dL    Calcium 8 8 8 3 - 10 1 mg/dL    eGFR 67 ml/min/1 73sq m   Tissue Exam    Collection Time: 08/24/21  3:09 PM   Result Value Ref Range    Case Report       Surgical Pathology Report                         Case: [id number]                                   Authorizing Provider:  Irasema England MD Collected:           08/24/2021 1509              Ordering Location:     [address] [place] [address]      Received:            08/24/2021 Austin Espinosa                                                              Pathologist:           Yessy Edouard MD                                                                Specimen:    Lung, LLL, cores x8                                                                        Preliminary Diagnosis       Confirmatory studies pending     A  Left lower lobe, lung (core biopsy):     - Keratinizing squamous cell carcinoma  Microscopic Description       - Representative section: A1        Additional Information       All reported additional testing was performed with appropriately reactive controls  These tests were developed and their performance characteristics determined by [facility] or appropriate performing facility, though some tests may be performed on tissues which have not been validated for performance characteristics (such as staining performed on alcohol exposed cell blocks and decalcified tissues)  Results should be interpreted with caution and in the context of the patients clinical condition  These tests may not be cleared or approved by the [place]  Food and Drug Administration, though the FDA has determined that such clearance or approval is not necessary  These tests are used for clinical purposes and they should not be regarded as investigational or for research  This laboratory has been approved by [facility], designated as a high-complexity laboratory and is qualified to perform these tests  Interpretation performed at [facility], [address] [place] [address] Intraoperative Consultation          TPDxA: Jose Robertson spoke with Dr Jd Velazquez at 3:25 pm on 08/24/2021  Interpretation performed at [facility], [address] [place] [address]          Gross Description A  The specimen is received in formalin, labeled with the patient's name and hospital number, and is designated "  left lower lung"  The specimen consists of 3 tan to colorless soft tissue cores, each measuring less than 0 1 cm in diameter, with lengths ranging from 0 7-1 6 cm each  Also present in the specimen container are multiple tan to colorless soft tissue fragments measuring in aggregate 0 6 x 0 4 by less than 0 1 cm  Touch preps are performed  Entirely submitted  Four cassettes  1 core in each of cassettes 1-3, between sponges; fragments in an embedding bag in cassette 2  Note: The estimated total formalin fixation time based upon information provided by the submitting clinician and the standard processing schedule is 6 5 hours  MCrites        CBC    Collection Time: 08/25/21  4:50 AM   Result Value Ref Range    WBC 10 79 (H) 4 31 - 10 16 Thousand/uL    RBC 3 58 (L) 3 81 - 5 12 Million/uL    Hemoglobin 9 5 (L) 11 5 - 15 4 g/dL    Hematocrit 29 9 (L) 34 8 - 46 1 %    MCV 84 82 - 98 fL    MCH 26 5 (L) 26 8 - 34 3 pg    MCHC 31 8 31 4 - 37 4 g/dL    RDW 16 0 (H) 11 6 - 15 1 %    Platelets 984 796 - 260 Thousands/uL    MPV 12 0 8 9 - 12 7 fL   Basic metabolic panel    Collection Time: 08/25/21  4:50 AM   Result Value Ref Range    Sodium 134 (L) 136 - 145 mmol/L    Potassium 3 4 (L) 3 5 - 5 3 mmol/L    Chloride 102 100 - 108 mmol/L    CO2 26 21 - 32 mmol/L    ANION GAP 6 4 - 13 mmol/L    BUN 17 5 - 25 mg/dL    Creatinine 0 73 0 60 - 1 30 mg/dL    Glucose 92 65 - 140 mg/dL    Calcium 8 6 8 3 - 10 1 mg/dL    eGFR 72 ml/min/1 73sq m         CT abdomen pelvis wo contrast    Result Date: 8/19/2021  Narrative: CT ABDOMEN AND PELVIS WITHOUT IV CONTRAST INDICATION:   Abdominal pain, acute, nonlocalized abdominal pain, nausea w/ eating  COMPARISON:  CT abdomen/pelvis 3/19/2016   TECHNIQUE:  CT examination of the abdomen and pelvis was performed without intravenous contrast   Axial, sagittal, and coronal 2D reformatted images were created from the source data and submitted for interpretation  Radiation dose length product (DLP) for this visit:  477 06 mGy-cm   This examination, like all CT scans performed in the [facility], was performed utilizing techniques to minimize radiation dose exposure, including the use of iterative  reconstruction and automated exposure control  Enteric contrast was administered  FINDINGS: ABDOMEN LOWER CHEST: Emphysematous changes  Partially visualized left lower lobe mass measuring approximately 7 5 x 6 9 cm  Small left pleural effusion  Mitral annular calcifications  Large hiatal hernia containing the majority of the stomach  LIVER/BILIARY TREE:  Stable left hepatic lobe simple cyst   Subcentimeter peripheral hyperdense focus of capsular retraction in segment 7, of uncertain etiology, possibly focal scarring  GALLBLADDER:  Gallbladder is surgically absent  SPLEEN:  Unremarkable  PANCREAS:  Unremarkable  ADRENAL GLANDS:  Unremarkable  KIDNEYS/URETERS:  No hydronephrosis or urinary tract calculus  One or more sharply circumscribed subcentimeter renal hypodensities are present, too small to accurately characterize, and statistically most likely benign findings  According to recent literature (Radiology 2019) no further workup of these findings is recommended  Right mid renal subcentimeter hyperdensity, likely hemorrhagic cyst  STOMACH AND BOWEL:  There is colonic diverticulosis without evidence of acute diverticulitis  APPENDIX:  No findings to suggest appendicitis  ABDOMINOPELVIC CAVITY:  No ascites  No pneumoperitoneum  No lymphadenopathy  There is a 2 8 x 2 4 cm peritoneal soft tissue nodule adjacent to and causing mass effect on the inferior right hepatic lobe, concerning for metastatic disease  VESSELS:  Extensive atherosclerotic changes are present  No evidence of aneurysm  PELVIS REPRODUCTIVE ORGANS:  Unremarkable for patient's age   URINARY BLADDER: Unremarkable  ABDOMINAL WALL/INGUINAL REGIONS:  Tiny fat-containing umbilical hernia  OSSEOUS STRUCTURES:  No acute fracture or destructive osseous lesion  Spinal degenerative changes are noted  Impression: 1  Partially visualized at least 7 5 cm left lower lobe lung mass, concerning for primary malignancy  Nonemergent chest CT with contrast is recommended for further evaluation  2  A 2 8 cm peritoneal soft tissue nodule adjacent to the inferior right hepatic lobe, concerning for metastatic disease  The study was marked in EPIC for significant notification  Workstation performed: [id number]     XR chest portable    Result Date: 8/22/2021  Narrative: CHEST INDICATION:   breast pain  COMPARISON:  Chest radiograph from 4/7/2016, abdomen CT from 8/19/2021  EXAM PERFORMED/VIEWS:  XR CHEST PORTABLE FINDINGS: Normal heart size  Moderate hiatal hernia  13 cm left lung mass, partially imaged on the abdomen CT  Small left effusion  Cholecystectomy  Osseous structures appear within normal limits for patient age  Impression: Large left lung mass with small left effusion  Workstation performed: [id number]     IR biopsy lung    Result Date: 8/24/2021  Narrative: CT-scan guided core biopsy of a left lung mass History: 51-year-old female with history of left lung mass, now with request for biopsy of mass  Radiation dose: 871 16 mGy Conscious sedation time: 30 mins Technique: This examination, like all CT scans performed in the [facility], was performed utilizing techniques to minimize radiation dose exposure, including the use of iterative reconstruction and automated exposure control  The patient was brought to the CT scanner and placed right lateral decubitus on the table  After axial images were obtained through the region of interest an area of the skin was then marked, prepped, and draped in usual sterile fashion  Lidocaine was administered to the skin and a small skin incision was made   A 17 gauge cannula was advanced up to the lesion  Through the cannula, an18-gauge core biopsy was performed  5 additional passes were made  The specimen was found to be adequate for evaluation  The patient tolerated the procedure well and suffered no complications  Findings: A large left lung mass is present, measuring up to 10 5 cm  A mass measuring up to 5 5 cm is incidentally noted in the left axilla  There was no evidence of pneumothorax at the conclusion of the procedure  Impression: Impression: 1  Percutaneous core biopsy of left lung mass  A full pathology report will follow  2  A mass is present at the left axilla measuring up to 5 5 cm  Workstation performed: [id number]     VAS lower limb venous duplex study, unilateral/limited    Result Date: 8/21/2021  Narrative:  [facility] REPORT CLINICAL: Indications: Patient presents with left lower extremity edema x 1 day  Risk Factors The patient has history of HTN  CONCLUSION:  Impression: RIGHT LOWER LIMB LIMITED: Evaluation shows no evidence of thrombus in the common femoral vein  Doppler evaluation shows a normal response to augmentation maneuvers  LEFT LOWER LIMB: No evidence of acute or chronic deep vein thrombosis No evidence of superficial thrombophlebitis noted  Doppler evaluation shows a normal response to augmentation maneuvers  Popliteal, posterior tibial and anterior tibial arterial Doppler waveforms are triphasic  SIGNATURE: Electronically Signed by: Sandy Au MD on 2021-08-21 11:26:21 PM        Assessment :  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  Plan:  A 80-year-old female with newly diagnosed keratinizing squamous cell carcinoma  She has large tumor in the left lower lobe as well as right-sided peritoneal based metastatic disease  Since this is keratinizing squamous cell carcinoma as well as location in the lung, primary neoplasm may not be in the lung  Since we already know that she has metastatic disease, I would not investigate the primary location  Genomic testing such as EGFR, ALK or ROS1 is not indicated since this is not adenocarcinoma  I had long conversation with patient as well as her son, Anupam Mejia regarding the histology of the cancer as well as fact that this is stage IV disease  This is not curable condition  Treatment option will be systemic chemotherapy which I do not recommend because of her age as well as suboptimal performance status  I recommended her and her son's to consider best supportive care/hospice care  We also discussed the prognosis which is likely to be less than 6 months  They understood

## 2021-08-26 NOTE — ASSESSMENT & PLAN NOTE
· Reported on CT abdomen and pelvis: concerning for primary lung cancer metastatic to liver  · Oncology consult appreciated  · After discussion with daughter, decision was made to obtain IR guided lung biopsy  · Patient had a lung biopsy completed 08/24/2021  · On presentation, lung mass measured 7 5 cm, yesterday measured at 10 cm with a new axillary mass  · Possibly an aggressive disease  · Hold aspirin, hold Xarelto until decision made regarding hospice  · Continues to complain of left-sided chest pain, no EKG changes, negative troponin  It is cancer related pain  · Palliative care following for pain control  · Biopsy showing SCC - onc d/w son Elise Mask that only option is chemo, he would not want chemo & would opt hospice  · Pt to me asks if we can give her medicine to help die, I explained her that it is not an ethical approach & we can offer her hospice under which we can take care of her symptoms as disease takes its course  She agrees & wants to make sure she has no pain  · CM & palliative d/w son Andrew Ramon, opts for hospice   CM to work on [address]  under hospice

## 2021-08-26 NOTE — ASSESSMENT & PLAN NOTE
Patient currently lives alone by herself  PT and OT consult appreciated  Although they do not recommend any rehab however recommends cognitive evaluation  Son does not feel patient is safe to return to home as she will not have any supervision    He wishes patient to be placed in long-term nursing facility  Case management following: place her under hospice

## 2021-08-26 NOTE — PROGRESS NOTES
Progress note - Palliative and Supportive Care   Laya Echeverria 80 y o  female [id number]    Patient Active Problem List   Diagnosis    Hypothyroidism    Hypertension    Irritable bowel syndrome    History of stroke    Atheroma of artery    Transaminitis    Ampulla of Vater obstruction syndrome    Hyperkalemia    ASHLEY [facility]acute kidney injury) ([facility] [address] )    Metastatic disease (HCC)    Lung mass    Nausea    PAF (paroxysmal atrial fibrillation) (HCC)    History of malignant neoplasm of ampulla of Vater    Aortic stenosis    Social problem     Active issues specifically addressed today include:   Squamous cell carcinoma of the lung  Metastatic disease  History of malignant neoplasm of ampulla of Vater  Goals of care  Cancer related pain    Plan:  1  Symptom management -    - Cancer related pain: Currently on Tylenol 975 mg TID and Percocet 2 5 mg Q 4 hrs PRN for moderate/severe pain  Patient took 1 dose of Percocet in the last 24 hrs    2  Goals -    - patient more with hospice  She would like to have input from all 3 sons  I had a discussion with both yesterday and he was leaning towards hospice  Dr Daniel Smith had discussion this morning with Frida Davenport, regarding biopsy results and poor prognosis given patient not a candidate for chemotherapy due to age and frailty  Discussed with , she will try to reach out to Frida Davenport for possible family meeting later today or tomorrow     Code Status: DNR/DNI - Level 3   Decisional apparatus:  Patient is competent on my exam today  If competence is lost, patient's substitute decision maker would default to 3 children by [place]  Advance Directive / Living Will / POLST:  Yes, in chart    Interval history:       Patient seen and examined at bedside  Patient reports pain on left side of chest, worse when she lifts arm  She has been taking Tylenol scheduled and has only taken 1dose of Percocet during last 24 hours    Patient reports she is ready to go and she would like someone to just "stab a needle in her heart and go"  She agrees with hospice and comfort cares but wants input from children  She denies any other symptoms    MEDICATIONS / ALLERGIES:     current meds:   Current Facility-Administered Medications   Medication Dose Route Frequency    acetaminophen (TYLENOL) tablet 975 mg  975 mg Oral Q8H [address]    amLODIPine (NORVASC) tablet 5 mg  5 mg Oral Daily    atorvastatin (LIPITOR) tablet 40 mg  40 mg Oral Daily    bisacodyl (DULCOLAX) rectal suppository 10 mg  10 mg Rectal Daily PRN    docusate sodium (COLACE) capsule 100 mg  100 mg Oral BID    glycerin-hypromellose- (ARTIFICIAL TEARS) ophthalmic solution 1 drop  1 drop Both Eyes BID    levothyroxine tablet 100 mcg  100 mcg Oral Daily    lidocaine (LIDODERM) 5 % patch 1 patch  1 patch Topical Daily    melatonin tablet 3 mg  3 mg Oral HS    multivitamin-minerals (CENTRUM) tablet 1 tablet  1 tablet Oral Daily    ondansetron (ZOFRAN) injection 4 mg  4 mg Intravenous Q4H PRN    oxyCODONE (ROXICODONE) IR tablet 2 5 mg  2 5 mg Oral Q4H PRN    pantoprazole (PROTONIX) EC tablet 40 mg  40 mg Oral Early Morning    polyethylene glycol (MIRALAX) packet 17 g  17 g Oral Daily PRN    senna (SENOKOT) tablet 8 6 mg  1 tablet Oral HS       Allergies   Allergen Reactions    Tetanus Toxoids        OBJECTIVE:    Physical Exam  Physical Exam  Constitutional:       General: She is not in acute distress  Appearance: She is ill-appearing  Eyes:      General:         Right eye: No discharge  Left eye: No discharge  Conjunctiva/sclera: Conjunctivae normal    Pulmonary:      Effort: Pulmonary effort is normal  No respiratory distress  Abdominal:      Palpations: Abdomen is soft  Skin:     General: Skin is warm  Capillary Refill: Capillary refill takes less than 2 seconds  Neurological:      Mental Status: She is alert and oriented to person, place, and time  Mental status is at baseline  Psychiatric:         Mood and Affect: Mood normal          Behavior: Behavior normal            Counseling / Coordination of Care    Total floor / unit time spent today 25 minutes  Greater than 50% of total time was spent with the patient and / or family counseling and / or coordination of care   A description of the counseling / coordination of care: goals of care, symptom management, emotional and social support

## 2021-08-26 NOTE — ASSESSMENT & PLAN NOTE
· Pulmonary input appreciated  · After discussing with oncology, family decided to pursue biopsy of the lung  · currently in room air  · Status post biopsy - SCC of lung

## 2021-08-26 NOTE — CASE MANAGEMENT
BRIGIDA spoke to pt's son Nora Sher to follow up on their choice for an Assisted Living facility and to see if they had decided to pursue hospice  Per Nora Olivarez they had not made a final choice and he was going to try to see if he could meet with his brothers later today to see if they could come to a final decision  Per Nora Olivarez he is leaning towards [address] but he has to follow up with his brothers before he can make a final decision  BRIGIDA will follow

## 2021-08-26 NOTE — ASSESSMENT & PLAN NOTE
known moderate aortic stenosis  Last echo was 2015     Follows with OP cardio Dr Sue Abraham office for PAF

## 2021-08-26 NOTE — CASE MANAGEMENT
CM met with pt's, pt's family and palliative team  Per pt's family they are going to be [address] intermediate today at 4:00 PM  Pt's son Humphrey Nino also stated that he would like his mother to transition to hospice  Pt's son requested that a referral be sent to [facility], [facility]  BRIGIDA sent referral as requested via [facility] CM will follow

## 2021-08-26 NOTE — PROGRESS NOTES
[facility]  Progress Note - Willy Carnes 10/5/1928, 80 y o  female MRN: [id number]  Unit/Bed#: [facility] 919-01 Encounter: [id number]  Primary Care Provider: Lacy Covarrubias   Date and time admitted to hospital: 8/19/2021  1:53 PM    Social problem  Assessment & Plan  Patient currently lives alone by herself  PT and OT consult appreciated  Although they do not recommend any rehab however recommends cognitive evaluation  Son does not feel patient is safe to return to home as she will not have any supervision  He wishes patient to be placed in long-term nursing facility  Case management following: place her under hospice    Aortic stenosis  Assessment & Plan  known moderate aortic stenosis  Last echo was 2015  Follows with OP cardio Dr Agus Dang office for PAF    History of malignant neoplasm of ampulla of Vater  Assessment & Plan  Noted    PAF (paroxysmal atrial fibrillation) ([facility] [address] )  Assessment & Plan  ·   Xarelto, was on hold due to biopsy  Will continue to hold as hospice is being opted    Nausea  Assessment & Plan  · Probably due to metastatic disease  · Patient would like to eat a regular diet  · Aspiration precautions  · Speech therapy consult appreciated  · patient complains of nausea only associated with water intake  No nausea with ginger-saul  ·  start her on protonix  · Prn zofran    Lung mass  Assessment & Plan  · Pulmonary input appreciated  · After discussing with oncology, family decided to pursue biopsy of the lung  · currently in room air  · Status post biopsy - SCC of lung    ASHLEY (acute kidney injury) ([facility] [address] )  Assessment & Plan  · Probably due to dehydration in combination with hydrochlorothiazide use  · Resolved with fluid  · Hold hydrochlorothiazide for now, BP remains stable off HCTZ   May DC on discharge  · Discontinue fluid    Hypertension  Assessment & Plan  · Continue amlodipine    Hypothyroidism  Assessment & Plan  · Continue levothyroxine    * Metastatic disease [facility])  Assessment & Plan  · Reported on CT abdomen and pelvis: concerning for primary lung cancer metastatic to liver  · Oncology consult appreciated  · After discussion with daughter, decision was made to obtain IR guided lung biopsy  · Patient had a lung biopsy completed 08/24/2021  · On presentation, lung mass measured 7 5 cm, yesterday measured at 10 cm with a new axillary mass  · Possibly an aggressive disease  · Hold aspirin, hold Xarelto until decision made regarding hospice  · Continues to complain of left-sided chest pain, no EKG changes, negative troponin  It is cancer related pain  · Palliative care following for pain control  · Biopsy showing SCC - onc d/w son Joan Norman that only option is chemo, he would not want chemo & would opt hospice  · Pt to me asks if we can give her medicine to help die, I explained her that it is not an ethical approach & we can offer her hospice under which we can take care of her symptoms as disease takes its course  She agrees & wants to make sure she has no pain  · CM & palliative d/w son Jossy johnson, opts for hospice  CM to work on [facility] under [facility] Internal Medicine Progress Note  Patient: Ashley Ruano 80 y o  female   MRN: [id number]  PCP: Jaylin Police  Unit/Bed#: [facility] 919-01 Encounter: [id number]  Date Of Visit: 08/26/21    Assessment:    Principal Problem:    Metastatic disease ([facility] [address] )  Active Problems:    Hypothyroidism    Hypertension    ASHLEY (acute kidney injury) ([facility] [address] )    Lung mass    Nausea    PA[facility]ox[facility]) [facility]    History of malignant neoplasm of ampulla of Vater    Aortic stenosis    Social problem      Plan:           VTE Pharmacologic Prophylaxis:     comfort    Mechanical VTE Prophylaxis in Place: Yes    Patient Centered Rounds: I have performed bedside rounds with nursing staff today      Discussions with Specialists or Other Care Team Provider: palliative    Education and Discussions with Family / Patient: patient    Time Spent for Care: 30 minutes  More than 50% of total time spent on counseling and coordination of care as described above  Current Length of Stay: 7 day(s)  Current Patient Status: Inpatient     Certification Statement: The patient will continue to require additional inpatient hospital stay due to work on dispo under hospice    Discharge Plan / Estimated Discharge Date: soon    Code Status: Level 3 - DNAR and DNI      Subjective:   She is nauseous, left CP is better  Wants to die    Objective:     Vitals:   Temp (24hrs), Av 5 °F (36 4 °C), Min:97 4 °F (36 3 °C), Max:97 6 °F (36 4 °C)    Temp:  [97 4 °F (36 3 °C)-97 6 °F (36 4 °C)] 97 4 °F (36 3 °C)  HR:  [60-76] 76  Resp:  [15-20] 15  BP: (116-130)/(56-67) 116/67  SpO2:  [94 %-95 %] 95 %  Body mass index is 31 09 kg/m²  Input and Output Summary (last 24 hours): Intake/Output Summary (Last 24 hours) at 2021  Last data filed at 2021 1300  Gross per 24 hour   Intake 120 ml   Output --   Net 120 ml       Physical Exam:   Physical Exam  Vitals reviewed  HENT:      Head: Normocephalic and atraumatic  Mouth/Throat:      Mouth: Mucous membranes are moist    Cardiovascular:      Rate and Rhythm: Normal rate and regular rhythm  Heart sounds: Normal heart sounds  Pulmonary:      Effort: Pulmonary effort is normal  No respiratory distress  Breath sounds: Normal breath sounds  No wheezing  Abdominal:      General: There is no distension  Palpations: Abdomen is soft  Tenderness: There is no abdominal tenderness  Musculoskeletal:      Right lower leg: No edema  Left lower leg: No edema  Neurological:      Mental Status: She is alert and oriented to person, place, and time            Additional Data:     Labs:  Results from last 7 days   Lab Units 21  0450   WBC Thousand/uL 10 79*   HEMOGLOBIN g/dL 9 5*   HEMATOCRIT % 29 9*   PLATELETS Thousands/uL 316     Results from last 7 days   Lab Units 08/25/21  0450 08/20/21  0500   SODIUM mmol/L 134* 137   POTASSIUM mmol/L 3 4* 3 2*   CHLORIDE mmol/L 102 105   CO2 mmol/L 26 27   BUN mg/dL 17 30*   CREATININE mg/dL 0 73 1 19   ANION GAP mmol/L 6 5   CALCIUM mg/dL 8 6 8 5   ALBUMIN g/dL  --  1 5*   TOTAL BILIRUBIN mg/dL  --  0 39   ALK PHOS U/L  --  152*   ALT U/L  --  14   AST U/L  --  17   GLUCOSE RANDOM mg/dL 92 94     Results from last 7 days   Lab Units 08/24/21  0913   INR  1 28*                   Imaging: No pertinent imaging reviewed  Recent Cultures (last 7 days):           Lines/Drains:  Invasive Devices     Peripheral Intravenous Line            Peripheral IV 08/24/21 Left Antecubital 2 days                Telemetry:        Last 24 Hours Medication List:   Current Facility-Administered Medications   Medication Dose Route Frequency Provider Last Rate    acetaminophen  975 mg Oral Q8H Mervin Hood MD      amLODIPine  5 mg Oral Daily Lynne Lebron MD      atorvastatin  40 mg Oral Daily Lynne Lebron MD      bisacodyl  10 mg Rectal Daily PRN Perlita MORROW Crouse, DO      docusate sodium  100 mg Oral BID Kathy He MD      glycerin-hypromellose-  1 drop Both Eyes BID Kathy He MD      levothyroxine  100 mcg Oral Daily Lynne Lebron MD      lidocaine  1 patch Topical Daily Mirna Lawrence PA-C      melatonin  3 mg Oral HS Lynne Lebron MD      multivitamin-minerals  1 tablet Oral Daily Lynne Lebron MD      ondansetron  4 mg Oral Q6H PRN Lyle Yadav MD      oxyCODONE  2 5 mg Oral Q4H PRN Sushila Duncan MD      pantoprazole  40 mg Oral Early Morning Kathy He MD      polyethylene glycol  17 g Oral Daily PRN Perlita MORROW Crouse, DO      senna  1 tablet Oral HS Perlita Hopper, DO          Today, Patient Was Seen By: Lyle Yadav MD    ** Please Note: This note has been constructed using a voice recognition system   **

## 2021-08-27 NOTE — ASSESSMENT & PLAN NOTE
Patient currently lives alone by herself  PT and OT consult appreciated  Although they do not recommend any rehab however recommends cognitive evaluation  Son does not feel patient is safe to return to home as she will not have any supervision    He wishes patient to be placed in long-term nursing facility  Case management following: DC to [facility] with PT OT

## 2021-08-27 NOTE — PLAN OF CARE
Problem: OCCUPATIONAL THERAPY ADULT  Goal: Performs self-care activities at highest level of function for planned discharge setting  See evaluation for individualized goals  Description: Treatment Interventions: ADL retraining, Functional transfer training, UE strengthening/ROM, Endurance training, Cognitive reorientation, Patient/family training, Equipment evaluation/education, Compensatory technique education, Continued evaluation, Activityengagement, Energy conservation          See flowsheet documentation for full assessment, interventions and recommendations  Outcome: Progressing  Note: Limitation: Decreased ADL status, Decreased endurance, Decreased self-care trans, Decreased high-level ADLs  Prognosis: Fair  Assessment: Patient participated in Skilled OT session this date with interventions consisting of ADL re training with the use of correct body mechnaics, Energy Conservation techniques, safety awareness and fall prevention techniques,  therapeutic activities to: increase activity tolerance, increase dynamic sit/ stand balance during functional activity  and increase OOB/ sitting tolerance   Upon arrival patient was found supine in bed  Pt demonstrated the following tasks: S sup to sit, S STS, S fnxl ambulation with RW (initially CGA for fnxl ambulation, but progresses to S)  Pt performs toileting with S, MIN A for transfer  S to MIN A for hair care, MIN A for LBD  Patient continues to be functioning below baseline level, occupational performance remains limited secondary to factors listed above and increased risk for falls and injury  From OT standpoint, recommendation at time of d/c would be facility with rehab services  Patient to benefit from continued Occupational Therapy treatment while in the hospital to address deficits as defined above and maximize level of functional independence with ADLs and functional mobility   Pt was left in chair with alarm on after session with all current needs met  The patient's raw score on the AM-PAC Daily Activity inpatient short form is 19, standardized score is 40 22, greater than 39 4  Patients at this level are likely to benefit from discharge to home  Please refer to the recommendation of the Occupational Therapist for safe discharge planning       OT Discharge Recommendation: Return to facility with rehabilitation services  OT - OK to Discharge: Yes (when medically stable )

## 2021-08-27 NOTE — PROGRESS NOTES
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, family still deciding on hospice vs possible PT/OT eval/treat while there  -d/w patient & son/Carson at bedside, patient will go to [facility] and try PT/OT, will transition to hospice at later time  -completed POLST with patient, sons     -original in patient's paper chart     -copy scanned into chart, 2 copies provided to son     -DNAR/DNI, limited interventions with limit of do not re-hospitalize, do not transfer to hospital for decline-transition to comfort, limited antibiotics for comfort, no artificial hydration or nutrition by tube  -d/w primary, d/w CM  -will follow peripherally as needed    Met with patient initially  In good spirits today  Defers to sons for decision making  Returned to room later in day  Son/Carson from [place] present at bedside  Says he just met with hospice liaisons  He reports that the sons have decided to hold on hospice at this time, and have PT/OT for patient at [facility]  Discussed option of transitioning to hospice at anytime while there and encouraged ongoing discussion with patient regarding her wishes  Introduced POLST form and purpose  Discussed indications facility may have to transfer patient back to hospital for treatment/interventions and discussed alternative  Patient participated in discussion and asked what the other option would be   We discussed that if she were to have an acute event or abrupt deterioration, that they could keep her at facility and transition to comfort-focused care until the time of her death/passing, and not transfer her to hospital for treatment  Patient said "yes, (with OK sign) that's what I want"  She also stated that she does not want to die in pain  Son/Carson called son/Ed/POA and we also revisited this discussion and the POLST form  Patient again expressed her wishes to not return to hospital for further treatment once at facility  POLST form completed, scanned, copies given to son, original in paper chart to go to facility with patient  CM and primary team notified of completion  Patient, son expressed appreciation  Emotional support provided  Code status: Level 3, DNAR/DNI    Subjective:  Patient seen and examined  Without complaints  Hard of hearing, hearing aid on   States she needs to get up, get washed, and get out of bed to chair  Defers to her sons regarding plan of care  She states she feels pretty good  Patient holding on to bag of [employer], says the stash will last her months  Last BM yesterday 8/26/21      Received:  oxyIR 7 5mg/last 24h = 11 25mg approx OME      Medications    Current Facility-Administered Medications:     acetaminophen (TYLENOL) tablet 975 mg, 975 mg, Oral, Q8H [address], Moriah Barnes MD, 975 mg at 08/27/21 0509    amLODIPine (NORVASC) tablet 5 mg, 5 mg, Oral, Daily, Augusto Bean MD, 5 mg at 08/26/21 0909    atorvastatin (LIPITOR) tablet 40 mg, 40 mg, Oral, Daily, Augusto Bean MD, 40 mg at 08/26/21 0909    bisacodyl (DULCOLAX) rectal suppository 10 mg, 10 mg, Rectal, Daily PRN, Perlita Hopper DO    docusate sodium (COLACE) capsule 100 mg, 100 mg, Oral, BID, Sanket Paredes MD, 100 mg at 08/26/21 0909    glycerin-hypromellose- (ARTIFICIAL TEARS) ophthalmic solution 1 drop, 1 drop, Both Eyes, BID, Sanket Paredes MD, 1 drop at 08/26/21 1729    levothyroxine tablet 100 mcg, 100 mcg, Oral, Daily, Augusto Bean MD, 100 mcg at 08/26/21 0909    lidocaine (LIDODERM) 5 % patch 1 patch, 1 patch, Topical, Daily, Clotarlene Martínez PA-C, 1 patch at 08/26/21 7800    melatonin tablet 3 mg, 3 mg, Oral, HS, Vianey Gibbons MD, 3 mg at 08/26/21 2303    multivitamin-minerals (CENTRUM) tablet 1 tablet, 1 tablet, Oral, Daily, Vianey Gibbons MD, 1 tablet at 08/26/21 0909    ondansetron (ZOFRAN-ODT) dispersible tablet 4 mg, 4 mg, Oral, Q6H PRN, Lolis Dumont MD    oxyCODONE (ROXICODONE) IR tablet 2 5 mg, 2 5 mg, Oral, Q4H PRN, Mickey Peralta MD, 2 5 mg at 08/26/21 2306    pantoprazole (PROTONIX) EC tablet 40 mg, 40 mg, Oral, Early Morning, Sanket Paredes MD, 40 mg at 08/27/21 0509    polyethylene glycol (MIRALAX) packet 17 g, 17 g, Oral, Daily PRN, Donnelle L Ruchi, DO    senna (SENOKOT) tablet 8 6 mg, 1 tablet, Oral, HS, Donnelle L Thompson, DO, 8 6 mg at 08/26/21 2303    Objective:  /61   Pulse 67   Temp 97 6 °F (36 4 °C)   Resp 16   Ht 5' 2" (1 575 m)   Wt 77 1 kg (170 lb)   SpO2 92%   BMI 31 09 kg/m²   Physical Exam:  General: pleasant, nad, hard of hearing  Neurological: aaox3, intermittent confusion  Cardiovascular: reg  Respiratory: normal effort, no respiratory distress  Gastrointestinal: soft, nt, nd  Musculoskeletal: + pitting edema  Skin: warm, dry, pale  Psychiatric: pleasant, intermittent confusion    Lab Results: I have personally reviewed pertinent labs  , CBC: No results found for: WBC, HGB, HCT, MCV, PLT, ADJUSTEDWBC, MCH, MCHC, RDW, MPV, NRBC, CMP: No results found for: SODIUM, K, CL, CO2, ANIONGAP, BUN, CREATININE, GLUCOSE, CALCIUM, AST, ALT, ALKPHOS, PROT, BILITOT, EGFR    Counseling / Coordination of Care  Total floor / unit time spent today 45 minutes  Greater than 50% of total time was spent with the patient and / or family counseling and / or coordinating of care  A description of the counseling / coordination of care: current condition, goals of care, hospice discussion, transition to facility, POLST completion      Kathrine Zhong Americo Hicks, DO  Palliative & Supportive Care

## 2021-08-27 NOTE — HOSPICE NOTE
Received hospice referral  Had long discussion with her son Supriya Pacheco  He is very concerned about stopping Xarelto and would not want her on Coumadin  He was also told by PT that she would benefit from home PT after discharge   CM notified and will place [facility] referral

## 2021-08-27 NOTE — ASSESSMENT & PLAN NOTE
known moderate aortic stenosis  Last echo was 2015     Follows with OP cardio Dr Daryle Darner office for PAF

## 2021-08-27 NOTE — CASE MANAGEMENT
BRIGIDA received a voicemail from [place] from [facility] who stated that pt's family has requested that CM sent a referral via [employer] and requested fax number to send DME  CM spoke to pt's son Rasta Sidhu who requested that CM sent referral to [facility] as that is the facility that they would like pt to transition to  CM sent referral and left VM for Leyda with CM's fax number  CM will follow

## 2021-08-27 NOTE — OCCUPATIONAL THERAPY NOTE
Occupational Therapy Progress Note     Patient Name: Iron Santana  IXRGL'I Date: 8/27/2021  Problem List  Principal Problem:    Metastatic disease [facility])  Active Problems:    Hypothyroidism    Hypertension    ASHLEY (acute kidney injury) ([facility] [address] )    Lung mass    Nausea    PAF (paroxysmal atrial fibrillation) ([facility] [address] )    History of malignant neoplasm of ampulla of Vater    Aortic stenosis    Social problem            08/27/21 1144   OT Last Visit   OT Visit Date 08/27/21   Note Type   Note Type Treatment   Restrictions/Precautions   Weight Bearing Precautions Per Order No   Other Precautions Cognitive; Chair Alarm; Bed Alarm; Fall Risk;Hard of hearing   General   Response to Previous Treatment Patient with no complaints from previous session   Lifestyle   Autonomy PTA, pt reports being I with ADLs and some IADLs, son and DIL assist with higher level IADLs   Reciprocal Relationships Family    Service to 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[email or web address] Going to the [facility]    Pain Assessment   Pain Assessment Tool 0-10   Pain Score No Pain   ADL   Where Assessed Chair   Grooming Assistance 5  Supervision/Setup   Grooming Deficit Wash/dry face;Brushing hair   Grooming Comments Pt initially brushes hair standing at sink - does require increased assistance towards end of task 2* fatigue    LB Bathing Assistance 5  Supervision/Setup   LB Bathing Deficit Right lower leg including foot; Left lower leg including foot   UB Dressing Assistance 4  Minimal Assistance   UB Dressing Deficit Thread RUE; Thread LUE   LB Dressing Assistance 4  Minimal Assistance   LB Dressing Deficit Don/doff R sock; Don/doff L sock   Toileting Assistance  5  Supervision/Setup   Bed Mobility   Supine to Sit 5  Supervision   Additional items Assist x 1; Increased time required;HOB elevated   Sit to Supine Unable to assess   Transfers   Sit to Stand 5  Supervision   Additional items Assist x 1; Increased time required Stand to Sit 5  Supervision   Additional items Assist x 1; Increased time required   Toilet transfer 4  Minimal assistance   Additional items Assist x 1; Increased time required   Additional Comments Transfers with RW    Functional Mobility   Functional Mobility 5  Supervision   Additional Comments initially CGA, progresses to S    Additional items Rolling walker   Cognition   Arousal/Participation Alert; Responsive; Cooperative   Attention Within functional limits   Orientation Level Oriented to person;Oriented to place   Following Commands Follows one step commands without difficulty   Comments Pt very pleasant and cooperative t/o session    Activity Tolerance   Activity Tolerance Patient tolerated treatment well   Medical Staff Made Aware RN clearance for session    Assessment   Assessment Patient participated in Skilled OT session this date with interventions consisting of ADL re training with the use of correct body mechnaics, Energy Conservation techniques, safety awareness and fall prevention techniques,  therapeutic activities to: increase activity tolerance, increase dynamic sit/ stand balance during functional activity  and increase OOB/ sitting tolerance   Upon arrival patient was found supine in bed  Pt demonstrated the following tasks: S sup to sit, S STS, S fnxl ambulation with RW (initially CGA for fnxl ambulation, but progresses to S)  Pt performs toileting with S, MIN A for transfer  S to MIN A for hair care, MIN A for LBD  Patient continues to be functioning below baseline level, occupational performance remains limited secondary to factors listed above and increased risk for falls and injury  From OT standpoint, recommendation at time of d/c would be facility with rehab services  Patient to benefit from continued Occupational Therapy treatment while in the hospital to address deficits as defined above and maximize level of functional independence with ADLs and functional mobility   Pt was left in chair with alarm on after session with all current needs met  The patient's raw score on the AM-PAC Daily Activity inpatient short form is 19, standardized score is 40 22, greater than 39 4  Patients at this level are likely to benefit from discharge to home  Please refer to the recommendation of the Occupational Therapist for safe discharge planning  Plan   Treatment Interventions ADL retraining;Functional transfer training;UE strengthening/ROM; Endurance training;Cognitive reorientation;Patient/family training;Equipment evaluation/education; Compensatory technique education;Continued evaluation; Activityengagement; Energy conservation   Goal Expiration Date 09/08/21   OT Treatment Day 2   OT Frequency 2-3x/wk   Recommendation   OT Discharge Recommendation Return to facility with rehabilitation services   OT - OK to Discharge Yes  (when medically stable )   AM-PAC Daily Activity Inpatient   Lower Body Dressing 3   Bathing 3   Toileting 3   Upper Body Dressing 3   Grooming 3   Eating 4   Daily Activity Raw Score 19   Daily Activity Standardized Score (Calc for Raw Score >=11) 40 22   AM-PAC Applied Cognition Inpatient   Following a Speech/Presentation 3   Understanding Ordinary Conversation 4   Taking Medications 3   Remembering Where Things Are Placed or Put Away 3   Remembering List of 4-5 Errands 2   Taking Care of Complicated Tasks 2   Applied Cognition Raw Score 17   Applied Cognition Standardized Score 36 52          Cece Bauer MS, OTR/L

## 2021-08-27 NOTE — ASSESSMENT & PLAN NOTE
· Reported on CT abdomen and pelvis: concerning for primary lung cancer metastatic to liver  · Oncology consult appreciated  · After discussion with daughter, decision was made to obtain IR guided lung biopsy  · Patient had a lung biopsy completed 08/24/2021  · On presentation, lung mass measured 7 5 cm, yesterday measured at 10 cm with a new axillary mass  · Possibly an aggressive disease  · Hold aspirin, hold Xarelto until decision made regarding hospice  · Continues to complain of left-sided chest pain, no EKG changes, negative troponin  It is cancer related pain  · Palliative care following for pain control  · Biopsy showing SCC - onc d/w son Nando Culver that only option is chemo, he would not want chemo & would opt hospice  · Pt to me asks if we can give her medicine to help die, I explained her that it is not an ethical approach & we can offer her hospice under which we can take care of her symptoms as disease takes its course  She agrees & wants to make sure she has no pain  · CM & palliative d/w son Dee Dee Robins again - they want to send her to [facility] , attempt at PT OT & if any acute decline transition her to comfort measures   POLST completed

## 2021-08-27 NOTE — PROGRESS NOTES
[facility]  Progress Note - Sherwin Zacarias 10/5/1928, 80 y o  female MRN: [id number]  Unit/Bed#: [facility] 919-01 Encounter: [id number]  Primary Care Provider: Myrna Soria   Date and time admitted to hospital: 8/19/2021  1:53 PM    * Metastatic disease [facility])  Assessment & Plan  · Reported on CT abdomen and pelvis: concerning for primary lung cancer metastatic to liver  · Oncology consult appreciated  · After discussion with daughter, decision was made to obtain IR guided lung biopsy  · Patient had a lung biopsy completed 08/24/2021  · On presentation, lung mass measured 7 5 cm, yesterday measured at 10 cm with a new axillary mass  · Possibly an aggressive disease  · Hold aspirin, hold Xarelto until decision made regarding hospice  · Continues to complain of left-sided chest pain, no EKG changes, negative troponin  It is cancer related pain  · Palliative care following for pain control  · Biopsy showing SCC - onc d/w son Lali Krishnan that only option is chemo, he would not want chemo & would opt hospice  · Pt to me asks if we can give her medicine to help die, I explained her that it is not an ethical approach & we can offer her hospice under which we can take care of her symptoms as disease takes its course  She agrees & wants to make sure she has no pain  · CM & palliative d/w son Shu Mann again - they want to send her to [facility] , attempt at PT OT & if any acute decline transition her to comfort measures  POLST completed    Social problem  Assessment & Plan  Patient currently lives alone by herself  PT and OT consult appreciated  Although they do not recommend any rehab however recommends cognitive evaluation  Son does not feel patient is safe to return to home as she will not have any supervision    He wishes patient to be placed in long-term nursing facility  Case management following: DC to [facility] with PT OT     Aortic stenosis  Assessment & Plan  known moderate aortic stenosis  Last echo was 2015  Follows with OP cardio Dr Eli Velazco office for PAF    History of malignant neoplasm of ampulla of Vater  Assessment & Plan  Noted    [facility]ox[facility]mal [facility]  Assessment & Plan  ·   Xarelto, was on hold due to biopsy  · Will restart it, as she is not comfort care right now    Nausea  Assessment & Plan  · Probably due to metastatic disease  · Patient would like to eat a regular diet  · Aspiration precautions  · Speech therapy consult appreciated  · patient complains of nausea only associated with water intake  No nausea with ginger-saul  ·  start her on protonix  · Prn zofran    Lung mass  Assessment & Plan  · Pulmonary input appreciated  · After discussing with oncology, family decided to pursue biopsy of the lung  · currently in room air  · Status post biopsy - SCC of lung    ASHLEY (acute kidney injury) ([facility] 75 )  Assessment & Plan  · Probably due to dehydration in combination with hydrochlorothiazide use  · Resolved with fluid  · Hold hydrochlorothiazide for now, BP remains stable off HCTZ  May DC on discharge  · Discontinue fluid    Hypertension  Assessment & Plan  · Continue amlodipine    Hypothyroidism  Assessment & Plan  · Continue levothyroxine      [facility]'s Internal Medicine Progress Note  Patient: Sherwin Zacarias 80 y o  female   MRN: [id number]  PCP: Myrna Soria  Unit/Bed#: [facility] 693-07 Encounter: [id number]  Date Of Visit: 08/27/21    Assessment:    Principal Problem:    Metastatic disease ([facility] )  Active Problems:    Hypothyroidism    Hypertension    ASHLEY (acute kidney injury) ([facility] )    Lung mass    Nausea    PAF (paroxysmal atrial fibrillation) ([facility] )    History of malignant neoplasm of ampulla of Vater    Aortic stenosis    Social problem      Plan:           VTE Pharmacologic Prophylaxis:     Moderate Risk (Score 3-4) - Pharmacological DVT Prophylaxis Ordered: Rivaroxaban (Xarelto)  Mechanical VTE Prophylaxis in Place: Yes    Patient Centered Rounds:  I have performed bedside rounds with nursing staff today  Discussions with Specialists or Other Care Team Provider: palliative care    Education and Discussions with Family / Patient: patient    Time Spent for Care: 30 minutes  More than 50% of total time spent on counseling and coordination of care as described above  Current Length of Stay: 8 day(s)  Current Patient Status: Inpatient     Certification Statement: The patient will continue to require additional inpatient hospital stay due to pending bed availability    Discharge Plan / Estimated Discharge Date: when bed available    Code Status: Level 3 - DNAR and DNI      Subjective:   Feels well, has no major complains    Objective:     Vitals:   Temp (24hrs), Av 8 °F (36 6 °C), Min:97 6 °F (36 4 °C), Max:98 °F (36 7 °C)    Temp:  [97 6 °F (36 4 °C)-98 °F (36 7 °C)] 98 °F (36 7 °C)  HR:  [67-73] 73  Resp:  [16] 16  BP: (117-125)/(57-61) 125/57  SpO2:  [92 %] 92 %  Body mass index is 31 09 kg/m²  Input and Output Summary (last 24 hours):     No intake or output data in the 24 hours ending 21 1643    Physical Exam:   Physical Exam  Vitals reviewed  HENT:      Head: Normocephalic and atraumatic  Mouth/Throat:      Mouth: Mucous membranes are moist    Cardiovascular:      Rate and Rhythm: Normal rate and regular rhythm  Heart sounds: Normal heart sounds  Pulmonary:      Effort: Pulmonary effort is normal  No respiratory distress  Breath sounds: Normal breath sounds  No wheezing  Abdominal:      General: There is no distension  Palpations: Abdomen is soft  Tenderness: There is no abdominal tenderness  Musculoskeletal:      Right lower leg: No edema  Left lower leg: No edema  Neurological:      Mental Status: She is alert and oriented to person, place, and time            Additional Data:     Labs:  Results from last 7 days   Lab Units 21  0450   WBC Thousand/uL 10 79*   HEMOGLOBIN g/dL 9 5*   HEMATOCRIT % 29 9*   PLATELETS Thousands/uL 316     Results from last 7 days   Lab Units 08/25/21  0450   SODIUM mmol/L 134*   POTASSIUM mmol/L 3 4*   CHLORIDE mmol/L 102   CO2 mmol/L 26   BUN mg/dL 17   CREATININE mg/dL 0 73   ANION GAP mmol/L 6   CALCIUM mg/dL 8 6   GLUCOSE RANDOM mg/dL 92     Results from last 7 days   Lab Units 08/24/21  0913   INR  1 28*                   Imaging: No pertinent imaging reviewed  Recent Cultures (last 7 days):           Lines/Drains:  Invasive Devices     Peripheral Intravenous Line            Peripheral IV 08/24/21 Left Antecubital 3 days                Telemetry:        Last 24 Hours Medication List:   Current Facility-Administered Medications   Medication Dose Route Frequency Provider Last Rate    acetaminophen  975 mg Oral Q8H Maxine Jarvis MD      amLODIPine  5 mg Oral Daily Flori Keith, MD      atorvastatin  40 mg Oral Daily Flori Keith, MD      bisacodyl  10 mg Rectal Daily PRN Perlita Hopper DO      docusate sodium  100 mg Oral BID Dinesh Roy MD      glycerin-hypromellose-  1 drop Both Eyes BID Dinesh Roy MD      levothyroxine  100 mcg Oral Daily Flori Keith, MD      lidocaine  1 patch Topical Daily Jonnie Hernandez PA-C      melatonin  3 mg Oral HS Flori Keith, MD      multivitamin-minerals  1 tablet Oral Daily Flori Keith, MD      ondansetron  4 mg Oral Q6H PRN Gerard Owusu MD      oxyCODONE  2 5 mg Oral Q4H PRN Joe Stout MD      pantoprazole  40 mg Oral Early Morning Dinesh Roy MD      polyethylene glycol  17 g Oral Daily PRN Perlita Hopper DO      [START ON 8/28/2021] rivaroxaban  15 mg Oral Daily With Breakfast Gerard Owusu MD      senna  1 tablet Oral HS Perlita Hopper DO          Today, Patient Was Seen By: Gerard Owusu MD    ** Please Note: This note has been constructed using a voice recognition system   **

## 2021-08-28 PROBLEM — C78.7 NON-SMALL CELL LUNG CANCER METASTATIC TO LIVER (HCC): Status: ACTIVE | Noted: 2021-01-01

## 2021-08-28 PROBLEM — D63.8 ANEMIA OF CHRONIC DISEASE: Status: ACTIVE | Noted: 2021-01-01

## 2021-08-28 PROBLEM — C34.90 NON-SMALL CELL LUNG CANCER METASTATIC TO LIVER (HCC): Status: ACTIVE | Noted: 2021-01-01

## 2021-08-28 NOTE — ASSESSMENT & PLAN NOTE
Continue Norvasc  HCTZ discontinued due to acute kidney injury and adequate BP control one agent monotherapy

## 2021-08-28 NOTE — ASSESSMENT & PLAN NOTE
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  CT imaging for staging noting an inferior right hepatic lobe nodule concerning for metastasis - elevated alkaline phosphatase noted on LFTs  Appreciate palliative care assistance w/ pain control  After family discussions previously, start discharge planning back to assisted living facility w/ home health services -> if continues to decline in the outpatient setting, family will pursue hospice at that time -> anticipate discharge on Monday 8/30  Supportive care otherwise

## 2021-08-28 NOTE — PROGRESS NOTES
[facility]  Progress Note - Rahel Billy 10/5/1928, 80 y o  female MRN: [id number]  [facility] Encounter: [id number]  Primary Care Provider: Stanley Beck   Date and time admitted to hospital: 8/19/2021  1:53 PM      * 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  Assessment & Plan  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  CT imaging for staging noting an inferior right hepatic lobe nodule concerning for metastasis - elevated alkaline phosphatase noted on LFTs  Appreciate palliative care assistance w/ pain control  After family discussions previously, start discharge planning back to assisted living facility w/ home health services -> if continues to decline in the outpatient setting, family will pursue hospice at that time -> anticipate discharge on Monday 8/30  Supportive care otherwise    History of stroke  Assessment & Plan  Continue statin/Xarelto  PT/OT as tolerated    Essential hypertension  Assessment & Plan  Continue Norvasc  HCTZ discontinued due to acute kidney injury and adequate BP control one agent monotherapy    Hypothyroidism  Assessment & Plan  Continue Synthroid    PAF (paroxysmal atrial fibrillation)  Assessment & Plan  Rate controlled off agents  Resumed Xarelto for anticoagulation post-biopsy    Acute kidney injury  Assessment & Plan  Likely secondary to dehydration combined with HCTZ use  Renal function normalized s/p IV fluids and discontinuation of HCTZ    Anemia of chronic disease  Assessment & Plan  Monitor H/H      DVT Prophylaxis: Xarelto      Patient Centered Rounds:  I have performed bedside rounds and discussed plan of care with nursing today  Discussions with Specialists or Other Care Team Provider:  see above assessments if applicable    Education and Discussions with Family / Patient: Patient at bedside    Time Spent for Care:  32 minutes    More than 50% of total time spent on counseling and coordination of care as described above     Current Length of Stay: 9 day(s)    Current Patient Status: Inpatient   Certification Statement:  Patient will continue to require additional hospital stay due to assessments as noted above  Code Status: Level 3 - DNAR and DNI        Subjective:     Remains weak/fatigued  Denies worsening pain at this time  Objective:     Vitals:   Temp (24hrs), Av 8 °F (36 6 °C), Min:97 4 °F (36 3 °C), Max:98 2 °F (36 8 °C)    Temp:  [97 4 °F (36 3 °C)-98 2 °F (36 8 °C)] 98 2 °F (36 8 °C)  HR:  [57-65] 57  Resp:  [18] 18  BP: (110-123)/(60-72) 110/60  SpO2:  [94 %-95 %] 94 %  Body mass index is 31 09 kg/m²  Input and Output Summary (last 24 hours):        Intake/Output Summary (Last 24 hours) at 2021 1628  Last data filed at 2021 0501  Gross per 24 hour   Intake 480 ml   Output 2 ml   Net 478 ml       Physical Exam:     GENERAL:  Weak/fatigued  HEAD:  Normocephalic - atraumatic  EYES: PERRL - EOMI   MOUTH:  Mucosa moist  NECK:  Supple - full range of motion  CARDIAC:  Rate controlled currently - S1/S2 positive  PULMONARY:  Fairly clear to auscultation - nonlabored respirations  ABDOMEN:  Soft - nontender/nondistended - active bowel sounds  MUSCULOSKELETAL:  Motor strength/range of motion markedly deconditioned  NEUROLOGIC:  Cognitive impairment present  SKIN:  Chronic wrinkles/blemishes   PSYCHIATRIC:  Mood/affect flat      Additional Data:     Labs & Recent Cultures:    Results from last 7 days   Lab Units 21  0450   WBC Thousand/uL 10 79*   HEMOGLOBIN g/dL 9 5*   HEMATOCRIT % 29 9*   PLATELETS Thousands/uL 316     Results from last 7 days   Lab Units 21  0450   POTASSIUM mmol/L 3 4*   CHLORIDE mmol/L 102   CO2 mmol/L 26   BUN mg/dL 17   CREATININE mg/dL 0 73   CALCIUM mg/dL 8 6     Results from last 7 days   Lab Units 21  0913   INR  1 28*                             Last 24 Hours Medication List:   Current Facility-Administered Medications   Medication Dose Route Frequency Provider Last Rate    acetaminophen  975 mg Oral Q8H Jun Ward MD      amLODIPine  5 mg Oral Daily Maxine Spear MD      atorvastatin  40 mg Oral Daily Maxine Spear MD      bisacodyl  10 mg Rectal Daily PRN Perlita Hopper,       docusate sodium  100 mg Oral BID Pantera Ortega MD      glycerin-hypromellose-  1 drop Both Eyes BID Pantera Ortega MD      levothyroxine  100 mcg Oral Daily Maxine Spear MD      lidocaine  1 patch Topical Daily Jessica Leavitt PA-C      melatonin  3 mg Oral HS Maxine Spear MD      multivitamin-minerals  1 tablet Oral Daily Maxine Spear MD      ondansetron  4 mg Oral Q6H PRN Inder Guzman MD      oxyCODONE  2 5 mg Oral Q4H PRN Sim Barba MD      pantoprazole  40 mg Oral Early Morning Pantera Ortega MD      polyethylene glycol  17 g Oral Daily PRN Perlita Hopper,       rivaroxaban  15 mg Oral Daily With Breakfast Inder Guzman MD      senna  1 tablet Oral HS Perlita Hopper DO                      ** Please Note: This note is constructed using a voice recognition dictation system  An occasional wrong word/phrase or sound-a-like substitution may have been picked up by dictation device due to the inherent limitations of voice recognition software  Read the chart carefully and recognize, using reasonable context, where substitutions may have occurred  **

## 2021-08-28 NOTE — ASSESSMENT & PLAN NOTE
Likely secondary to dehydration combined with HCTZ use  Renal function normalized s/p IV fluids and discontinuation of HCTZ

## 2021-08-29 NOTE — PROGRESS NOTES
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  Assessment & Plan  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  CT imaging for staging noting an inferior right hepatic lobe nodule concerning for metastasis - elevated alkaline phosphatase noted on LFTs  Appreciate palliative care assistance w/ pain control  After family discussions previously, initiated discharge planning back to assisted living facility w/ home health services -> if continues to decline in the outpatient setting, family will pursue hospice at that time -> anticipate discharge tomorrow  Supportive care otherwise    History of stroke  Assessment & Plan  Continue statin/Xarelto  PT/OT as tolerated    Essential hypertension  Assessment & Plan  Continue Norvasc  HCTZ discontinued due to acute kidney injury and adequate BP control one agent monotherapy    Hypothyroidism  Assessment & Plan  Continue Synthroid    PAF (paroxysmal atrial fibrillation)  Assessment & Plan  Rate controlled off agents  Resumed Xarelto for anticoagulation post-biopsy    Acute kidney injury  Assessment & Plan  Likely secondary to dehydration combined with HCTZ use  Renal function normalized s/p IV fluids and discontinuation of HCTZ    Anemia of chronic disease  Assessment & Plan  Monitor H/H      DVT Prophylaxis:  Xarelto       Patient Centered Rounds:  I have performed bedside rounds and discussed plan of care with nursing today  Discussions with Specialists or Other Care Team Provider:  see above assessments if applicable    Education and Discussions with Family / Patient:  Patient at bedside - family aware of plan    Time Spent for Care:  32 minutes    More than 50% of total time spent on counseling and coordination of care as described above  Current Length of Stay: 10 day(s)    Current Patient Status: Inpatient   Certification Statement:  Patient will continue to require additional hospital stay due to assessments as noted above  Code Status: Level 3 - DNAR and DNI        Subjective:     No new complaints this time  Remains weak/fatigued  Objective:     Vitals:   Temp (24hrs), Av 3 °F (36 8 °C), Min:98 2 °F (36 8 °C), Max:98 5 °F (36 9 °C)    Temp:  [98 2 °F (36 8 °C)-98 5 °F (36 9 °C)] 98 3 °F (36 8 °C)  HR:  [49-75] 75  Resp:  [17-18] 17  BP: ()/(59-64) 95/64  SpO2:  [90 %-94 %] 92 %  Body mass index is 31 09 kg/m²  Input and Output Summary (last 24 hours):        Intake/Output Summary (Last 24 hours) at 2021 1417  Last data filed at 2021 1300  Gross per 24 hour   Intake 600 ml   Output --   Net 600 ml       Physical Exam:     GENERAL:  Weak/fatigued  HEAD:  Normocephalic - atraumatic  EYES: PERRL - EOMI   MOUTH:  Mucosa moist  NECK:  Supple - full range of motion  CARDIAC:  Regular rate/rhythm - S1/S2 positive  PULMONARY:  Clear breath sounds bilaterally - nonlabored respirations  ABDOMEN:  Soft - nontender/nondistended - active bowel sounds  MUSCULOSKELETAL:  Motor strength/range of motion deconditioned  NEUROLOGIC:  Baseline cognitive impairment noted  SKIN:  Chronic wrinkles/blemishes   PSYCHIATRIC:  Mood/affect flat      Additional Data:     Labs & Recent Cultures:    Results from last 7 days   Lab Units 21  0450   WBC Thousand/uL 10 79*   HEMOGLOBIN g/dL 9 5*   HEMATOCRIT % 29 9*   PLATELETS Thousands/uL 316     Results from last 7 days   Lab Units 21  0450   POTASSIUM mmol/L 3 4*   CHLORIDE mmol/L 102   CO2 mmol/L 26   BUN mg/dL 17   CREATININE mg/dL 0 73   CALCIUM mg/dL 8 6     Results from last 7 days   Lab Units 21  0913   INR  1 28*             Last 24 Hours Medication List:   Current Facility-Administered Medications   Medication Dose Route Frequency Provider Last Rate    acetaminophen  975 mg Oral Q8H Hernesto Navarro MD      amLODIPine  5 mg Oral Daily Aman Santacruz MD      atorvastatin  40 mg Oral Daily Aman Santacruz MD      bisacodyl  10 mg Rectal Daily PRN Perlita MORROW Crouse, DO      docusate sodium  100 mg Oral BID Adria Trimble MD      glycerin-hypromellose-  1 drop Both Eyes BID Adria Trimble MD      levothyroxine  100 mcg Oral Daily Aamn Santacruz MD      lidocaine  1 patch Topical Daily Eleni Hansen PA-C      melatonin  3 mg Oral HS Aman Santacruz MD      multivitamin-minerals  1 tablet Oral Daily Aman Santacruz MD      ondansetron  4 mg Oral Q6H PRN Duane Dickerson MD      oxyCODONE  2 5 mg Oral Q4H PRN Jean Corona MD      pantoprazole  40 mg Oral Early Morning Adria Trimble MD      polyethylene glycol  17 g Oral Daily PRN Perlita MORROW Crouse, DO      rivaroxaban  15 mg Oral Daily With Breakfast Duane Dickerson MD      senna  1 tablet Oral HS Perlita Hopper, DO                      ** Please Note: This note is constructed using a voice recognition dictation system  An occasional wrong word/phrase or sound-a-like substitution may have been picked up by dictation device due to the inherent limitations of voice recognition software  Read the chart carefully and recognize, using reasonable context, where substitutions may have occurred  **

## 2021-08-29 NOTE — PHYSICAL THERAPY NOTE
PHYSICAL THERAPY NOTE          Patient Name: Kayla Quinones  JSPDF'[id number] Date: 8/29/2021 08/29/21 1409   PT Last Visit   PT Visit Date 08/29/21   Note Type   Note Type Treatment   Pain Assessment   Pain Assessment Tool 0-10   Pain Score 5   Pain Location/Orientation Orientation: Left; Location: Abdomen  (under breast )   Patient's Stated Pain Goal No pain   Hospital Pain Intervention(s) Repositioned; Ambulation/increased activity   Restrictions/Precautions   Weight Bearing Precautions Per Order No   Other Precautions Cognitive; Chair Alarm; Bed Alarm; Fall Risk;Pain;Hard of hearing   General   Chart Reviewed Yes   Response to Previous Treatment Patient with no complaints from previous session  Family/Caregiver Present Yes   Cognition   Overall Cognitive Status Impaired   Arousal/Participation Alert; Responsive; Cooperative   Attention Within functional limits   Orientation Level Oriented to person;Oriented to place   Memory Decreased recall of recent events;Decreased recall of precautions   Following Commands Follows one step commands without difficulty   Comments Pt very pleasant and cooperative to participate in therapy session  Bed Mobility   Supine to Sit 5  Supervision   Additional items Assist x 1; Increased time required;Verbal cues;HOB elevated   Sit to Supine Unable to assess   Additional Comments Pt supine in bed upon arrival  Pt sitting upright in bedside chair with chair alarm on with all needs wihtin reach at the end of therapy session  Transfers   Sit to Stand 5  Supervision   Additional items Assist x 1; Increased time required;Verbal cues   Stand to Sit 5  Supervision   Additional items Assist x 1; Increased time required;Verbal cues   Additional Comments Transfers with RW; cues for hand placement and sequnecing  Ambulation/Elevation   Gait pattern Excessively slow; Short stride; Foward flexed;Decreased foot clearance Gait Assistance 5  Supervision   Additional items Assist x 1;Verbal cues   Assistive Device Rolling walker   Distance 2 x 50 ft   (cues for upright posture, foward gaze)   Balance   Static Sitting Fair +   Dynamic Sitting Fair   Static Standing Fair   Dynamic Standing Fair -   Ambulatory Fair -   Activity Tolerance   Activity Tolerance Patient tolerated treatment well;Patient limited by fatigue   Nurse Made Aware RN cleared pt to participate in therapy session    Exercises   Knee AROM Long Arc Quad Sitting;Bilateral;10 reps  (x2 sets  )   Ankle Pumps Sitting;Bilateral;10 reps  (x2 sets; heel raises )   Marching Sitting;Bilateral;10 reps  (x2sets )   Assessment   Prognosis Good   Problem List Decreased endurance; Impaired balance;Decreased mobility   Assessment Pt seen for PT treatment session this date  Therapy session focused on bed mobility, functional transfers, gait training, therapeutic exercise and endurance training in order to improve overall mobility and independence  Pt requires assist of 1 for all mobility  Pt continues to be limited by fatigue, frequent rest breaks required before, during and after all mobility tasks  Pt performed bed mobility tasks at S level  Pt performed STS from EOB to RW at S level; cues required for hand placement and sequencing  Pt ambulated in hallway with RW at S level  Pt performed seated b/l LE therex to hips, knees, and ankles with no c/o increased pain/ fatigue  Pt making progress toward goals  Pt was left sitting upright in bedside chair at the end of PT session with all needs in reach  Pt would benefit from continued PT services while in hospital to address remaining limitations  PT to continue pt and recommends home with HHPT and increased social support; planning to transition to [facility]- recommend rehab services there  The patient's AM-PAC Basic Mobility Inpatient Short Form Raw Score is 19, Standardized Score is 42 48   A standardized score less than 42 9 suggests the patient may benefit from discharge to post-acute rehabilitation services  Please also refer to the recommendation of the Physical Therapist for safe discharge planning  Barriers to Discharge Decreased caregiver support   Goals   Patient Goals to have less pain    STG Expiration Date 09/08/21   PT Treatment Day 1   Plan   Treatment/Interventions Functional transfer training;LE strengthening/ROM; Therapeutic exercise; Endurance training;Patient/family training;Equipment eval/education; Bed mobility;Gait training;Spoke to nursing   Progress Progressing toward goals   PT Frequency 2-3x/wk   Recommendation   PT Discharge Recommendation Home with home health rehabilitation  ([facility] with rehab services )   Equipment Recommended [facility] Recommended Wheeled walker   PT - OK to Discharge Yes  (once medically cleared )   Additional Comments please also refer to OT recommendations    AM-PAC Basic Mobility Inpatient   Turning in Bed Without Bedrails 4   Lying on Back to Sitting on Edge of Flat Bed 3   Moving Bed to Chair 3   Standing Up From Chair 3   Walk in Room 3   Climb 3-5 Stairs 3   Basic Mobility Inpatient Raw Score 19   Basic Mobility Standardized Score 42 48     Pearlean Severin, PT, DPT

## 2021-08-29 NOTE — ASSESSMENT & PLAN NOTE
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  CT imaging for staging noting an inferior right hepatic lobe nodule concerning for metastasis - elevated alkaline phosphatase noted on LFTs  Appreciate palliative care assistance w/ pain control  After family discussions previously, initiated discharge planning back to assisted living facility w/ home health services -> if continues to decline in the outpatient setting, family will pursue hospice at that time -> anticipate discharge tomorrow  Supportive care otherwise

## 2021-08-29 NOTE — PLAN OF CARE
Problem: PHYSICAL THERAPY ADULT  Goal: Performs mobility at highest level of function for planned discharge setting  See evaluation for individualized goals  Description: Treatment/Interventions: Functional transfer training, LE strengthening/ROM, Endurance training, Patient/family training, Equipment eval/education, Gait training, Spoke to nursing, Spoke to case management, OT  Equipment Recommended: Harvey [place]       See flowsheet documentation for full assessment, interventions and recommendations  Outcome: Progressing  Note: Prognosis: Good  Problem List: Decreased endurance, Impaired balance, Decreased mobility  Assessment: Pt seen for PT treatment session this date  Therapy session focused on bed mobility, functional transfers, gait training, therapeutic exercise and endurance training in order to improve overall mobility and independence  Pt requires assist of 1 for all mobility  Pt continues to be limited by fatigue, frequent rest breaks required before, during and after all mobility tasks  Pt performed bed mobility tasks at S level  Pt performed STS from EOB to RW at S level; cues required for hand placement and sequencing  Pt ambulated in hallway with RW at S level  Pt performed seated b/l LE therex to hips, knees, and ankles with no c/o increased pain/ fatigue  Pt making progress toward goals  Pt was left sitting upright in bedside chair at the end of PT session with all needs in reach  Pt would benefit from continued PT services while in hospital to address remaining limitations  PT to continue pt and recommends home with HHPT and increased social support; planning to transition to [facility]- recommend rehab services there  The patient's AM-PAC Basic Mobility Inpatient Short Form Raw Score is 19, Standardized Score is 42 48  A standardized score less than 42 9 suggests the patient may benefit from discharge to post-acute rehabilitation services   Please also refer to the recommendation of the Physical Therapist for safe discharge planning  Barriers to Discharge: Decreased caregiver support        PT Discharge Recommendation: [facility]     PT - OK to Discharge: Yes (once medically cleared )    See flowsheet documentation for full assessment

## 2021-08-30 NOTE — CASE MANAGEMENT
Per Dr Cheryl Dandy, pt is medically cleared for d/c today  Pt will transition to [facility] via [facility]  CM notified pt's nurse of d/c  IMM was reviewed w/ pt's son and he is in agreement with the d/c today  CM called Marcela Angelo ([phone]) from [address] [place] [address] and left a voicemail notifying her of the d/c today and inquiring about pt needing a covid test      Pt's son requested a letter from the doctor in order to break his mother's lease with her apartment  CM notified Dr Cheryl Dandy of the request      CM later spoke to Marcela Gi from ([phone]) from [address] [place] [address] and confirmed d/c plans  Cm inquired if pt would need a COVID test and per Marcela Angelo she was fine to d/c and they would test pt at the facility

## 2021-08-30 NOTE — DISCHARGE SUMMARY
Discharge Summary - [facility]  Patient: Janina De 80 y o  female   MRN: [id number]  PCP: Kellee Hicks  [facility]counter: [id number]            Discharging Physician / Practitioner: Vallorie Fabry, MD  PCP: Kellee Hicks  Admission Date:   Admission Orders (From admission, onward)     Ordered        08/19/21 1930  Inpatient Admission  Once                   Discharge Date: 08/30/21      Reason for Admission: Weakness      Discharge Diagnoses:     Principal Problem:    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    Active Problems:    History of stroke    Essential hypertension    Hypothyroidism    PAF (paroxysmal atrial fibrillation)    Anemia of chronic disease    Resolved Problems:    Acute kidney injury      Consultations During Hospital Stay:  · Palliative care  · Medical oncology  · Pulmonology      Hospital Course:     * 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  Assessment & Plan  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  CT imaging for staging noting an inferior right hepatic lobe nodule concerning for metastasis - elevated alkaline phosphatase noted on LFTs  Appreciate palliative care assistance w/ pain control  After family discussions previously, initiated discharge planning back to assisted living facility today w/ home health services -> if continues to decline in the outpatient setting, family will pursue hospice at that time   Supportive care otherwise     History of stroke  Assessment & Plan  Continue statin/Xarelto  PT/OT will continue post discharge to assisted living facility     Essential hypertension  Assessment & Plan  Continue Norvasc  HCTZ discontinued due to acute kidney injury and adequate BP control one agent monotherapy     Hypothyroidism  Assessment & Plan  Continue Synthroid     PAF (paroxysmal atrial fibrillation)  Assessment & Plan  Rate controlled off agents  Resumed Xarelto for anticoagulation post-biopsy     Acute kidney injury  Assessment & Plan  Likely secondary to dehydration combined with HCTZ use  Renal function normalized s/p IV fluids and indefinite discontinuation of HCTZ     Anemia of chronic disease  Assessment & Plan  H/H stable       Condition at Discharge: fair       Discharge Day Visit / Exam:     Vitals: Blood Pressure: 124/82 (08/30/21 0622)  Pulse: 105 (08/30/21 0622)  Temperature: 98 4 °F (36 9 °C) (08/30/21 0622)  Temp Source: Oral (08/22/21 2487)  Respirations: 20 (08/30/21 0852)  Height: 5' 2" (157 5 cm) (08/21/21 1342)  Weight - Scale: 77 1 kg (170 lb) (08/19/21 1221)  SpO2: 90 % (08/30/21 0622)      Physical exam - I had a face-to-face encounter with the patient on day of discharge  Discussion with Patient and/or Family:  The patient has been advised to return to the ER immediately if any symptoms recur or worsen  Discharge instructions/Information to Patient and/or Family:   See after visit summary for information provided to patient and/or family  Provisions for Follow-Up Care:  See after visit summary for information related to follow-up care and any pertinent home health orders  Disposition:   Assisted living with home health services      Discharge Medications:  See after visit summary for reconciled discharge medications provided to patient and/or family  Discharge Statement:  I spent 38 minutes discharging the patient  This time was spent on the day of discharge  I had direct contact with the patient on the day of discharge  Greater than 50% of the total time was spent examining patient, answering all patient questions, arranging and discussing plan of care with patient as well as directly providing post-discharge instructions  Additional time then spent on discharge activities  ** Please Note: This note is constructed using a voice recognition dictation system    An occasional wrong word/phrase or sound-a-like substitution may have been picked up by dictation device due to the inherent limitations of voice recognition software  Read the chart carefully and recognize, using reasonable context, where substitutions may have occurred  **

## 2021-08-31 NOTE — UTILIZATION REVIEW
Notification of Discharge   This is a Notification of Discharge from our facility [address]  Please be advised that this patient has been discharge from our facility  Below you will find the admission and discharge date and time including the patients disposition  UTILIZATION REVIEW CONTACT:  Kimberly Don  Utilization   Network Utilization Review Department  Phone: [phone] x carefully listen to the prompts  All voicemails are confidential   Email: [email or web address]     PHYSICIAN ADVISORY SERVICES:  FOR CYIX-CK-KIYY REVIEW - MEDICAL NECESSITY DENIAL  Phone: [phone]  Fax: [phone]  Email: [email or web address]     PRESENTATION DATE: 8/19/2021  1:53 PM  OBERVATION ADMISSION DATE:   INPATIENT ADMISSION DATE: 8/19/21  7:30 PM   DISCHARGE DATE: 8/30/2021 11:27 AM  DISPOSITION: Home with [place] with [address] INFORMATION:  Send all requests for admission clinical reviews, approved or denied determinations and any other requests to dedicated fax number below belonging to the campus where the patient is receiving treatment   List of dedicated fax numbers:  [address] DENIALS (Administrative/Medical Necessity) [phone]   1000  [facility] (Maternity/NICU/Pediatrics) [phone]   Delon Days [phone]   Binu Houston [phone]   Mariaelena Villatoro [phone]   [address] [phone]   [facility]  [phone]   [address] [place] [address], [facility]  [address] [facility] [address] [facility] [phone]

## 2021-09-01 NOTE — PROGRESS NOTES
Admission Report at [facility] has admitted your patient to [facility] service with the following disciplines:      PT  Response needed, please respond via tiger text vs EPIC vs work phone [phone] concerning pt prescribed x2 stool softeners Colace and bisacodyl     Primary focus of home health care ONCOLOGY    Patient stated goals of care   to get some sleep  to feel stronger to make it through the day    Anticipated visit pattern 1wk1 and starting week of 09 05 21  2wk3 and 1wk2    Significant clinical findings pt tested positive for COVID during nasal swab at [facility]  Upon return home from recent DC from hospital     Request for medication clarification duplication of stool softeners colace and bisacodyl    Thank you for allowing us to participate in the care of your patient        Rekha Medrano PT

## 2021-09-02 PROBLEM — A41.9 SEPSIS (HCC): Status: ACTIVE | Noted: 2021-01-01

## 2021-09-02 PROBLEM — U07.1 COVID-19: Status: ACTIVE | Noted: 2021-01-01

## 2021-09-02 PROBLEM — J96.00 ACUTE RESPIRATORY FAILURE DUE TO COVID-19 (HCC): Status: ACTIVE | Noted: 2021-01-01

## 2021-09-02 NOTE — CONSULTS
Consultation - Palliative and Supportive Care   Laya Echeverria 80 y o  female [id number]    Assessment:  Acute respiratory failure due to Covid - 19  Sepsis  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  Anemia  Hypothyroidism  HTN  PAF  H/O CVA  Goals of care counseling    Goals:  Level 3 code status  · Family meeting scheduled via [employer] 9/3 at 10:00   · Disease focused care with limits of DNR/DNI  Will continue discussions regarding  · Comfort care has been introduced  Family will discuss this tonight  · Family will consider a family meeting in coming days but right now want to try to fight covid  Plan:  Symptom management:  Pain  · Start tylenol 650mg PO Q6H PRN  · Start Oxycodone 2 5mg PO Q4H PRN    Social support:   Time spent providing supportive listening with son Frida Davenport  · They have not seen patient for a few weeks which is making this much more difficult  · Struggling with her being here alone  I have reviewed the patient's controlled substance dispensing history in the Prescription Drug Monitoring Program in compliance with the [facility] regulations before prescribing any controlled substances  Decisional apparatus:  Patient is competent on my exam today  If competence is lost, patient's substitute decision maker would default to 3 sons by [place] 169  Advance Directive / Living Will / POLST: On file    We appreciate the invitation to be involved in this patient's care  We will continue to follow throughout this hospitalization  Please do not hesitate to reach our on call provider through our clinic answering service at  should you have acute symptom control concerns  Amberly Snow, MSN, PEDRO, [facility]  Palliative and Supportive Care  Clinic/Answering Service: [phone]  You can find me on TigerConnect!      IDENTIFICATION:  Inpatient consult to Palliative Care  Consult performed by: PEDRO Calvillo  Consult ordered by: Shelley Marquez DO        Physician Requesting Consult: Chad Harrison DO  Reason for Consult / Principal Problem: GOC counseling and SM secondary to covid-19 and lung cancer  History of Present Illness:  Shola Martin is a 80 y o  female who presents with a palliative diagnosis of covid-19 and metastatic lung cancer  She was brought into the ED at [facility] from SNF today secondary to cough, shortness of breath and low SpO2  She is currently being maintained on 6L of O2 via NC  She did receive the [place] covid vaccine  Appears comfortable, no pain, being maintained on 6l of O2  Did eat some today  Spoke with son Ananya Figueredo via telephone regarding goals of care  Discussed expected disease trajectory and prognosis in the setting of covid-19  Explained disease focused care versus comfort care  Explained these topics in great detail  Discussed patients code status  Offered family a virtual family meeting however they do not want to schedule this just yet  Tonight patient's children will discuss situation in an effort to come together and determine how to best support patient  Ayaz Church time to express concerns, share his feelings about this and ask questions  At this time they request remain disease focused with limit of DNR/DNI until family has had time to absorb situation  Currently patient would be discharged home or a SNF if they chose comfort care  Spoke to son José Manuel Navarro  Reviewed above conversation and provided supportive listening regarding frustrations of issues with [facility] and recent hospitalizations  José Mnauel Navarro is agreeable to a family meeting tomorrow to discuss goals of care and will have all patients children present to participate in call       ROS  All other systems are negative    Past Medical History:   Diagnosis Date    Atheroma of artery     aorta    History of stroke     bifrontal CVA    Hypertension     Hypothyroidism     Intrahepatic bile duct stones     Irritable bowel syndrome     Murmur     Stroke [facility])      Past Surgical History:   Procedure Laterality Date    APPENDECTOMY      CARDIAC SURGERY      reveal linq cardiac monitor 5-26-15    CARPAL TUNNEL RELEASE      CHOLECYSTECTOMY      ERCP N/A 3/21/2016    Procedure: ENDOSCOPIC RETROGRADE CHOLANGIOPANCREATOGRAPHY (ERCP); Surgeon: Guadalupe Jordan MD;  [facility]; Service:     IR BIOPSY LUNG  8/24/2021    DC EGD TRANSORAL BIOPSY SINGLE/MULTIPLE N/A 3/21/2016    Procedure: RADIAL ENDOSCOPIC U/S;  Surgeon: Guadalupe Jordan MD;  [facility]; Service: Gastroenterology    DC ESOPHAGOGASTRODUODENOSCOPY TRANSORAL DIAGNOSTIC N/A 5/12/2016    Procedure: ESOPHAGOGASTRODUODENOSCOPY (EGD); Surgeon: Guadalupe oJrdan MD;  [facility]; Service: Gastroenterology    TONSILLECTOMY       Social History     Socioeconomic History    Marital status:      Spouse name: Not on file    Number of children: Not on file    Years of education: Not on file    Highest education level: Not on file   Occupational History     Comment: RETIRED   Tobacco Use    Smoking status: Former Smoker    Smokeless tobacco: Never Used   Vaping Use    Vaping Use: Never used   Substance and Sexual Activity    Alcohol use: Not Currently    Drug use: No    Sexual activity: Not on file   Other Topics Concern    Not on file   Social History Narrative    Not on file     Social Determinants of Health     Financial Resource Strain:     Difficulty of Paying Living Expenses:    Food Insecurity:     Worried About [address] in the Last Year:     [address] in the Last Year:    Transportation Needs:     Lack of Transportation (Medical):      Lack of Transportation (Non-Medical):    Physical Activity:     Days of Exercise per Week:     Minutes of Exercise per Session:    Stress:     Feeling of Stress :    Social Connections:     Frequency of Communication with Friends and Family:     Frequency of Social Gatherings with Friends and Family:     Attends Restorationism Services:     Active Member of Clubs or Organizations:     Attends Club or Organization Meetings:     Marital Status:    Intimate Partner Violence:     Fear of Current or Ex-Partner:     Emotionally Abused:     Physically Abused:     Sexually Abused:      Family History   Problem Relation Age of Onset    Stroke Mother        Medications:  all current active meds have been reviewed    Allergies   Allergen Reactions    Tetanus Toxoids        Objective:  /59 (BP Location: Left arm)   Pulse 84   Temp 99 2 °F (37 3 °C) (Oral)   Resp (!) 32   Ht 5' 2" (1 575 m)   Wt 66 5 kg (146 lb 9 7 oz)   SpO2 90%   BMI 26 81 kg/m²   Physical Exam:  Constitutional: Appears thin and frail  In no acute distress  Head: Normocephalic and atraumatic  Eyes: EOM are normal  No ocular discharge  No scleral icterus  Neck: no visible adenopathy or masses  Respiratory: Mild increased WOB  Wearing O2 via NC  Gastrointestinal: No abdominal distension  Musculoskeletal: No edema  Neurological: Alert, oriented and appropriately conversant  Skin: Dry, no diaphoresis  Psychiatric: Displays a normal mood and affect  Behavior, judgement and thought content appear normal      Lab Results:   I have personally reviewed pertinent labs  , CBC:   Lab Results   Component Value Date    WBC 8 08 09/02/2021    HGB 11 1 (L) 09/02/2021    HCT 35 3 09/02/2021    MCV 83 09/02/2021     09/02/2021    MCH 26 2 (L) 09/02/2021    MCHC 31 4 09/02/2021    RDW 16 4 (H) 09/02/2021    MPV 11 7 09/02/2021    NRBC 0 09/02/2021   , CMP:   Lab Results   Component Value Date    SODIUM 136 09/02/2021    K 3 3 (L) 09/02/2021    CL 99 (L) 09/02/2021    CO2 27 09/02/2021    BUN 19 09/02/2021    CREATININE 1 15 09/02/2021    CALCIUM 8 9 09/02/2021    AST 60 (H) 09/02/2021    ALT 31 09/02/2021    ALKPHOS 191 (H) 09/02/2021    EGFR 41 09/02/2021     Counseling / Coordination of Care  Total floor / unit time spent today 90 minutes   Greater than 50% of total time was spent with the patient and / or family counseling and / or coordination of care  A description of the counseling / coordination of care: provided medical updates to son Ed, determined goals of care, discussed palliative care and symptom management, discussed hospice care, determined competency and POA/HCA, determined social/family support, provided psychosocial support  Reviewed with SL[facility], ICU team, RN and CM

## 2021-09-02 NOTE — ASSESSMENT & PLAN NOTE
 SIRS criteria: Tachycardia, tachypnea   Suspected source: Pneumonia due to COVID-19   Lactic acid: 1 5   End organ damage: None   IV antibiotics: IV Rocephin given in ED  Will hold off on further abx at this time   Follow up on culture results   Monitor vital signs, laboratory studies

## 2021-09-02 NOTE — ASSESSMENT & PLAN NOTE
 Blood pressure is reviewed and acceptable   o Last recorded pressure: 134/88   Continue amlodipine   Monitor blood pressure

## 2021-09-02 NOTE — ED PROVIDER NOTES
History  Chief Complaint   Patient presents with    Shortness of Breath     Per EMS: Covid+ as of 8/30, has lung cancer (not currently being treated)  Pt c/o SOB and cough, worsening over the last few days  Was found to be 88% on RA that improved to 96% on 6L NC          HPI    Patient is a 80 yof who presents with SOB for the past several days  No vomiting or diarrhea  No chest pain  + sob  No focal neuro deficits  Known covid positive  History of cancer, no current treatment  MDM pleasant 80 yof, discussed code status and plan with her, for now, abx/labs, dnr/dni  Will treat for covid             REVIEWED PRIOR NOTES, SEE BELOW FOR RECENT RELEVANT NOTES  ===================================================================   Discharge Summary - [facility] Internal Medicine  Patient: Angy Gonzalez 80 y o  female   MRN: [id number]  PCP: Liliane Aguilar  [facility] Encounter: [id number]     Discharging Physician / Practitioner: Silvestre Carter MD  PCP: Liliane Aguilar  Admission Date:       Admission Orders (From admission, onward)              Ordered          08/19/21 1930   Inpatient Admission  Once                      Discharge Date: 08/30/21     Reason for Admission: Weakness      Discharge Diagnoses:      Principal Problem:    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     Active Problems:    History of stroke    Essential hypertension    Hypothyroidism    PAF (paroxysmal atrial fibrillation)    Anemia of chronic disease     Resolved Problems:    Acute kidney injury      Consultations During Hospital Stay:  · Palliative care  · Medical oncology  · Pulmonology  [facility] [place] [facility] Course:      * 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  Assessment & Plan  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  CT imaging for staging noting an inferior right hepatic lobe nodule concerning for metastasis - elevated alkaline phosphatase noted on LFTs  Appreciate palliative care assistance w/ pain control  After family discussions previously, initiated discharge planning back to assisted living facility today w/ home health services -> if continues to decline in the outpatient setting, family will pursue hospice at that time   Supportive care otherwise     History of stroke  Assessment & Plan  Continue statin/Xarelto  PT/OT will continue post discharge to assisted living facility     Essential hypertension  Assessment & Plan  Continue Norvasc  HCTZ discontinued due to acute kidney injury and adequate BP control one agent monotherapy     Hypothyroidism  Assessment & Plan  Continue Synthroid     PAF (paroxysmal atrial fibrillation)  Assessment & Plan  Rate controlled off agents  Resumed Xarelto for anticoagulation post-biopsy     Acute kidney injury  Assessment & Plan  Likely secondary to dehydration combined with HCTZ use  Renal function normalized s/p IV fluids and indefinite discontinuation of HCTZ     Anemia of chronic disease  Assessment & Plan  H/H stable      Condition at Discharge: fair      ===================================================================      Prior to Admission Medications   Prescriptions Last Dose Informant Patient Reported? Taking? Multiple Vitamins-Minerals (PRESERVISION AREDS 2 PO)   Yes No   Sig: Take by mouth daily  acetaminophen (TYLENOL) 325 mg tablet   No No   Sig: Take 3 tablets (975 mg total) by mouth every 8 (eight) hours   amLODIPine (NORVASC) 5 mg tablet   No No   Si 5 mg oral once daily(take 1 tablet of 5 mg and 1 tablet of 2 5 mg   atorvastatin (LIPITOR) 40 mg tablet   Yes No   Sig: Take 40 mg by mouth daily  bisacodyl (DULCOLAX) 10 mg suppository   No No   Sig: Insert 1 suppository (10 mg total) into the rectum daily as needed (constipation per bowel protocol)   docusate sodium (COLACE) 100 mg capsule   No No   Sig: Take 1 capsule (100 mg total) by mouth daily   levothyroxine 100 mcg tablet   Yes No   Sig: Take 100 mcg by mouth daily     lidocaine (LIDODERM) 5 %   No No   Sig: Apply 1 patch topically daily Remove & Discard patch within 12 hours or as directed by MD   melatonin 3 mg   No No   Sig: Take 1 tablet (3 mg total) by mouth daily at bedtime   oxyCODONE (ROXICODONE) 5 mg immediate release tablet   No No   Sig: Take 0 5 tablets (2 5 mg total) by mouth every 4 (four) hours as needed for moderate pain or severe painMax Daily Amount: 15 mg   pantoprazole (PROTONIX) 40 mg tablet   No No   Sig: Take 1 tablet (40 mg total) by mouth daily in the early morning   rivaroxaban (XARELTO) tablet   Yes No   Sig: Take 15 mg by mouth daily  Facility-Administered Medications: None       Past Medical History:   Diagnosis Date    Atheroma of artery     aorta    History of stroke     bifrontal CVA    Hypertension     Hypothyroidism     Intrahepatic bile duct stones     Irritable bowel syndrome     Murmur     [facility])        Past Surgical History:   Procedure Laterality Date    APPENDECTOMY      CARDIAC SURGERY      reveal linq cardiac monitor 5-26-15    CARPAL TUNNEL RELEASE      CHOLECYSTECTOMY      ERCP N/A 3/21/2016    Procedure: ENDOSCOPIC RETROGRADE CHOLANGIOPANCREATOGRAPHY (ERCP); Surgeon: Beatriz Atkinson MD;  [facility]; Service:     IR BIOPSY LUNG  8/24/2021    OK EGD TRANSORAL BIOPSY SINGLE/MULTIPLE N/A 3/21/2016    Procedure: RADIAL ENDOSCOPIC U/S;  Surgeon: Beatriz Atkinson MD;  [facility]; Service: Gastroenterology    OK ESOPHAGOGASTRODUODENOSCOPY TRANSORAL DIAGNOSTIC N/A 5/12/2016    Procedure: ESOPHAGOGASTRODUODENOSCOPY (EGD); Surgeon: Beatriz Atkinson MD;  [facility]; Service: Gastroenterology    TONSILLECTOMY         Family History   Problem Relation Age of Onset    Stroke Mother      I have reviewed and agree with the history as documented      E-Cigarette/Vaping    E-Cigarette Use Never User      E-Cigarette/Vaping Substances     Social History     Tobacco Use    Smoking status: Former Smoker    Smokeless tobacco: Never Used   Vaping Use    Vaping Use: Never used   Substance Use Topics    Alcohol use: Not Currently    Drug use: No       Review of Systems   Respiratory: Positive for cough and shortness of breath  All other systems reviewed and are negative  Physical Exam  Physical Exam  Vitals and nursing note reviewed  Constitutional:       Appearance: She is well-developed  She is ill-appearing  HENT:      Head: Normocephalic and atraumatic  Right Ear: External ear normal       Left Ear: External ear normal    Eyes:      Conjunctiva/sclera: Conjunctivae normal    Neck:      Vascular: No JVD  Trachea: No tracheal deviation  Cardiovascular:      Rate and Rhythm: Normal rate and regular rhythm  Heart sounds: Normal heart sounds  Pulmonary:      Effort: Pulmonary effort is normal  No respiratory distress  Breath sounds: Rales present  No wheezing  Abdominal:      Palpations: Abdomen is soft  Tenderness: There is no abdominal tenderness  There is no guarding or rebound  Musculoskeletal:         General: No tenderness  Cervical back: Normal range of motion and neck supple  Skin:     General: Skin is warm and dry  Findings: No erythema or rash  Neurological:      General: No focal deficit present  Mental Status: She is alert and oriented to person, place, and time  Motor: No weakness  Psychiatric:         Behavior: Behavior normal          Thought Content:  Thought content normal          Vital Signs  ED Triage Vitals   Temperature Pulse Respirations Blood Pressure SpO2   09/02/21 0500 09/02/21 0454 09/02/21 0454 09/02/21 0454 09/02/21 0454   99 2 °F (37 3 °C) 100 18 (!) 173/94 96 %      Temp Source Heart Rate Source Patient Position - Orthostatic VS BP Location FiO2 (%)   09/02/21 0454 09/02/21 0454 09/02/21 0454 09/02/21 0454 09/02/21 2234   Oral Monitor Sitting Left arm 100      Pain Score       09/02/21 0515       No Pain           Vitals:    09/02/21 1249 09/02/21 1830 09/02/21 1900 09/02/21 2137   BP: 136/59  146/75 136/58   Pulse: 84 (!) 116 (!) 106 (!) 114   Patient Position - Orthostatic VS: Sitting  Sitting Lying         Visual Acuity  Visual Acuity      Most Recent Value   L Pupil Size (mm)  4   R Pupil Size (mm)  4          ED Medications  Medications   ceftriaxone (ROCEPHIN) 1 g/50 mL in dextrose IVPB (0 mg Intravenous Stopped 9/2/21 0543)   potassium chloride (K-DUR,KLOR-CON) CR tablet 40 mEq (40 mEq Oral Given 9/2/21 0835)   remdesivir (Veklury) 200 mg in sodium chloride 0 9 % 250 mL IVPB (0 mg Intravenous Stopped 9/2/21 1233)   ondansetron (ZOFRAN) injection 4 mg (4 mg Intravenous Given 9/2/21 2239)       Diagnostic Studies  Results Reviewed     Procedure Component Value Units Date/Time    Blood culture #1 [[id number]] Collected: 09/02/21 0501    Lab Status: Preliminary result Specimen: Blood from Arm, Right Updated: 09/04/21 1001     Blood Culture No Growth at 48 hrs  Blood culture #2 [[id number]] Collected: 09/02/21 0502    Lab Status: Preliminary result Specimen: Blood from Arm, Left Updated: 09/04/21 1001     Blood Culture No Growth at 48 hrs      Procalcitonin with AM Reflex [[id number]]  (Normal) Collected: 09/02/21 1301    Lab Status: Final result Specimen: Blood from Arm, Right Updated: 09/02/21 1541     Procalcitonin 0 17 ng/ml     C-reactive protein [[id number]]  (Abnormal) Collected: 09/02/21 0543    Lab Status: Final result Specimen: Blood from Arm, Right Updated: 09/02/21 0833      5 mg/L     CK (with reflex to MB) [[id number]]  (Normal) Collected: 09/02/21 0543    Lab Status: Final result Specimen: Blood from Arm, Right Updated: 09/02/21 0748     Total CK 50 U/L     Novel Coronavirus (Covid-19),PCR [facility] - 2 Hour Stat [[id number]]  (Abnormal) Collected: 09/02/21 0503    Lab Status: Final result Specimen: Nares from Nasopharyngeal Swab Updated: 09/02/21 0686     SARS-CoV-2 Positive    Narrative: The specimen collection materials, transport medium, and/or testing methodology utilized in the production of these test results have been proven to be reliable in a limited validation with an abbreviated program under the Emergency Utilization Authorization provided by the FDA  Testing reported as "Presumptive positive" will be confirmed with secondary testing to ensure result accuracy  Clinical caution and judgement should be used with the interpretation of these results with consideration of the clinical impression and other laboratory testing  Testing reported as "Positive" or "Negative" has been proven to be accurate according to standard laboratory validation requirements  All testing is performed with control materials showing appropriate reactivity at standard intervals        NT-BNP PRO [[id number]]  (Abnormal) Collected: 09/02/21 0543    Lab Status: Final result Specimen: Blood from Arm, Right Updated: 09/02/21 0628     NT-proBNP 6,185 pg/mL     Comprehensive metabolic panel [[id number]]  (Abnormal) Collected: 09/02/21 0543    Lab Status: Final result Specimen: Blood from Arm, Right Updated: 09/02/21 3239     Sodium 136 mmol/L      Potassium 3 3 mmol/L      Chloride 99 mmol/L      CO2 27 mmol/L      ANION GAP 10 mmol/L      BUN 19 mg/dL      Creatinine 1 15 mg/dL      Glucose 133 mg/dL      Calcium 8 9 mg/dL      Corrected Calcium 10 7 mg/dL      AST 60 U/L      ALT 31 U/L      Alkaline Phosphatase 191 U/L      Total Protein 6 7 g/dL      Albumin 1 8 g/dL      Total Bilirubin 0 24 mg/dL      eGFR 41 ml/min/1 73sq m     Narrative:      [place] guidelines for Chronic Kidney Disease (CKD):     Stage 1 with normal or high GFR (GFR > 90 mL/min/1 73 square meters)    Stage 2 Mild CKD (GFR = 60-89 mL/min/1 73 square meters)    Stage 3A Moderate CKD (GFR = 45-59 mL/min/1 73 square meters)    Stage 3B Moderate CKD (GFR = 30-44 mL/min/1 73 square meters)    Stage 4 Severe CKD (GFR = 15-29 mL/min/1 73 square meters)    Stage 5 End Stage CKD (GFR <15 mL/min/1 73 square meters)  Note: GFR calculation is accurate only with a steady state creatinine    Troponin I [[id number]]  (Normal) Collected: 09/02/21 0543    Lab Status: Final result Specimen: Blood from Arm, Right Updated: 09/02/21 0617     Troponin I 0 04 ng/mL     Lactic acid [[id number]]  (Normal) Collected: 09/02/21 0502    Lab Status: Final result Specimen: Blood from Arm, Right Updated: 09/02/21 0551     LACTIC ACID 1 5 mmol/L     Narrative:      Result may be elevated if tourniquet was used during collection      Protime-INR [[id number]]  (Normal) Collected: 09/02/21 0502    Lab Status: Final result Specimen: Blood from Arm, Right Updated: 09/02/21 0547     Protime 14 2 seconds      INR 1 09    APTT [[id number]]  (Abnormal) Collected: 09/02/21 0502    Lab Status: Final result Specimen: Blood from Arm, Right Updated: 09/02/21 0547     PTT 45 seconds     D-Dimer [[id number]]  (Abnormal) Collected: 09/02/21 0502    Lab Status: Final result Specimen: Blood from Arm, Right Updated: 09/02/21 0547     D-Dimer, Quant 2 77 ug/ml FEU     CBC and differential [[id number]]  (Abnormal) Collected: 09/02/21 0502    Lab Status: Final result Specimen: Blood from Arm, Right Updated: 09/02/21 0535     WBC 8 08 Thousand/uL      RBC 4 24 Million/uL      Hemoglobin 11 1 g/dL      Hematocrit 35 3 %      MCV 83 fL      MCH 26 2 pg      MCHC 31 4 g/dL      RDW 16 4 %      MPV 11 7 fL      Platelets 753 Thousands/uL      nRBC 0 /100 WBCs      Neutrophils Relative 86 %      Immat GRANS % 0 %      Lymphocytes Relative 10 %      Monocytes Relative 4 %      Eosinophils Relative 0 %      Basophils Relative 0 %      Neutrophils Absolute 6 91 Thousands/µL      Immature Grans Absolute 0 03 Thousand/uL      Lymphocytes Absolute 0 78 Thousands/µL      Monocytes Absolute 0 34 Thousand/µL      Eosinophils Absolute 0 01 Thousand/µL      Basophils Absolute 0 01 Thousands/µL Blood gas, venous [[id number]]  (Abnormal) Collected: 09/02/21 0501    Lab Status: Final result Specimen: Blood from Arm, Right Updated: 09/02/21 0513     pH, Isamel 7 347     pCO2, Ismael 53 9 mm Hg      pO2, Ismael 29 1 mm Hg      HCO3, Ismael 28 9 mmol/L      Base Excess, Ismael 2 3 mmol/L      O2 Content, Ismael 8 0 ml/dL      O2 HGB, VENOUS 47 2 %                  XR chest 1 view portable   Final Result by Aldair Barnes MD (09/02 6229)      New right pulmonary opacities, which may represent pneumonia in the appropriate clinical setting  Unchanged left lung mass  Small left pleural effusion  Left axillary soft tissue, seen to advantage on prior CT               Workstation performed: [id number]                    Procedures  Procedures         ED Course                             SBIRT 22yo+      Most Recent Value   SBIRT (22 yo +)   In order to provide better care to our patients, we are screening all of our patients for alcohol and drug use  Would it be okay to ask you these screening questions? Yes Filed at: 09/02/2021 0459   Initial Alcohol Screen: US AUDIT-C    1  How often do you have a drink containing alcohol?  0 Filed at: 09/02/2021 0459   2  How many drinks containing alcohol do you have on a typical day you are drinking? 0 Filed at: 09/02/2021 0459   3a  Male UNDER 65: How often do you have five or more drinks on one occasion? 0 Filed at: 09/02/2021 0459   3b  FEMALE Any Age, or MALE 65+: How often do you have 4 or more drinks on one occassion? 0 Filed at: 09/02/2021 0459   Audit-C Score  0 Filed at: 09/02/2021 8910   JOSÉ: How many times in the past year have you    Used an illegal drug or used a prescription medication for non-medical reasons?   Never Filed at: 09/02/2021 0459                    MDM    Disposition  Final diagnoses:   [id number]     Time reflects when diagnosis was documented in both MDM as applicable and the Disposition within this note     Time User Action Codes Description Comment 9/2/2021  6:54 AM Elsa Luna, [address] [U07 1] COVID-19     9/2/2021 10:58 AM [place] Alondra Add [U07 1,  J96 00] Acute respiratory failure due to COVID-19 ([facility] [address] )     9/2/2021 10:58 AM [place] Alondra Add [C34 90,  C78 7] 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     9/2/2021 10:58 AM Yannick Alondra Add [Z71 89] Counseling regarding goals of care       ED Disposition     ED Disposition Condition Date/Time Comment    Admit Stable Thu Sep 2, 2021  6:54 AM Case was discussed with slim ap and the patient's admission status was agreed to be Admission Status: inpatient status to the service of Dr Zakia Mirza          Follow-up Information    None       Date, Time and Cause of Death    Date of Death: 9/2/21  Time of Death: 11:50 PM  Preliminary Cause of Death: Cardiopulmonary arrest ([facility] )  Entered by: Lul Barr PA-C [JP1 1]     Attribution     JP1 1 Lul Barr PA-C 09/03/21 04:05        Discharge Medication List as of 9/3/2021  4:36 AM      STOP taking these medications       acetaminophen (TYLENOL) 325 mg tablet Comments:   Reason for Stopping:         amLODIPine (NORVASC) 5 mg tablet Comments:   Reason for Stopping:         atorvastatin (LIPITOR) 40 mg tablet Comments:   Reason for Stopping:         bisacodyl (DULCOLAX) 10 mg suppository Comments:   Reason for Stopping:         docusate sodium (COLACE) 100 mg capsule Comments:   Reason for Stopping:         levothyroxine 100 mcg tablet Comments:   Reason for Stopping:         lidocaine (LIDODERM) 5 % Comments:   Reason for Stopping:         melatonin 3 mg Comments:   Reason for Stopping:         Multiple Vitamins-Minerals (PRESERVISION AREDS 2 PO) Comments:   Reason for Stopping:         oxyCODONE (ROXICODONE) 5 mg immediate release tablet Comments:   Reason for Stopping:         pantoprazole (PROTONIX) 40 mg tablet Comments:   Reason for Stopping:         rivaroxaban (XARELTO) tablet Comments:   Reason for Stopping:             No discharge procedures on file     PDMP Review       Value Time User    PDMP Reviewed  Yes 8/20/2021  7:27 AM Carolyn Mueller DO          ED Provider  Electronically Signed by           Martín Leigh MD  09/04/21 2323

## 2021-09-02 NOTE — ASSESSMENT & PLAN NOTE
· Patient recently discharged from [facility] on 08/30/2021 due to hospitalization related to lung cancer metastasis to the liver  · 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  · Patient is known to palliative care - per family discussion during last hospitalization "discharge planning back to [facility] with home health services; "if the patient continued to decline in the outpatient setting, family will pursure hospice at that time "  · Discussed with patient who would like conservative management and to be level 3 DNR DNI at this time, however if worsened she would like to trend his into hospice care  · Also discussed with son Dale Ceja  · Palliative care consult

## 2021-09-02 NOTE — ED NOTES
Pt claims hearing aid was left on side table and is no longer present   Pt also believes she could have left it at assisted living location     BEN Tian  09/02/21 1917

## 2021-09-02 NOTE — ASSESSMENT & PLAN NOTE
· Continue statin  · Home regimen of Xarelto, will change to Lovenox 1 mg/kg q12h per COVID-19 treatment pathway while inpatient

## 2021-09-02 NOTE — H&P
[address] 10/5/1928, 80 y o  female MRN: [id number]  Unit/Bed#: ED 11 Encounter: [id number]  Primary Care Provider: Yeimy Murphy   Date and time admitted to hospital: 9/2/2021  4:51 AM    * Acute respiratory failure due to COVID-19 [facility])  Assessment & Plan  · Presentation: Patient presents from [facility] rehab due to complaints of shortness of breath, cough and hypoxia  Per chart, SpO2 88% on room air  Patient was found to be COVID+ on 08/30/2021 per chart  Patient received Pfizer vaccine  · Will treat according to moderate pathway  · CXR: Large left lung mass noted, peripheral ground-glass opacities  Official read pending  · Check inflammatory markers: CRP pending, D-dimer 2 77,  procalcitonin pending  · Patient did not want remdesivir at this time   · Will start dexamethasone 6mg IV daily x 10 days  · Continue home statin  · Received IV ceftriaxone 1 g in ED  Will hold off on further antibiotics per COVID-19 mod treatment pathway  · Self-proning  · Initiate Lovenox 1 mg/kg q12h per COVID treatment pathway  Hold home Xarelto  · Monitor respiratory status, supplemental nasal cannula oxygen as needed  · Current O2 requirements upon admission: SpO2 94% on 5L  · Palliative care consult   · Please call misti Nino with updates daily  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  Assessment & Plan  · [facility] recently [facility] on [facility]21 due to hospitalization related to lung cancer metastasis to the liver  · 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     · Patient is known to palliative care - per family discussion during last hospitalization "discharge planning back to [facility] with home health services; "if the patient continued to decline in the outpatient setting, family will pursure hospice at that time "  · Discussed with patient who would like conservative management and to be level 3 DNR DNI at this time, however if worsened she would like to trend his into hospice care  · Also discussed with son Frida Davenport  · Palliative care consult  Sepsis ([address] )  Assessment & Plan   SIRS criteria: Tachycardia, tachypnea   Suspected source: Pneumonia due to COVID-19   Lactic acid: 1 5   End organ damage: None   IV antibiotics: IV Rocephin given in ED  Will hold off on further abx at this time   Follow up on culture results   Monitor vital signs, laboratory studies  History of stroke  Assessment & Plan  · Continue statin  · Home regimen of Xarelto, will change to Lovenox 1 mg/kg q12h per COVID-19 treatment pathway while inpatient  Anemia of chronic disease  Assessment & Plan  · Present on admission, hemoglobin of 11 1  · Stable  · Monitor H&H  Hypothyroidism  Assessment & Plan  · Continue levothyroxine  Essential hypertension  Assessment & Plan   Blood pressure is reviewed and acceptable   o Last recorded pressure: 134/88   Continue amlodipine   Monitor blood pressure  PAF (paroxysmal atrial fibrillation)  Assessment & Plan  · Patient is not on any rate controlling agents  · Home regimen of Xarelto, will change to Lovenox 1 mg/kg q12h per COVID-19 treatment pathway while inpatient  VTE Pharmacologic Prophylaxis: VTE Score: 5 High Risk (Score >/= 5) - Pharmacological DVT Prophylaxis Ordered: enoxaparin (Lovenox)  Sequential Compression Devices Ordered  Code Status: Prior level 3 DNR/DNI  Discussed with the patient in detail  Discussion with family: Updated  (son) via phone  Anticipated Length of Stay: Patient will be admitted on an inpatient basis with an anticipated length of stay of greater than 2 midnights secondary to COVID 19 infection   Total Time for Visit, including Counseling / Coordination of Care: 70 minutes Greater than 50% of this total time spent on direct patient counseling and coordination of care      Chief Complaint: shortness of breath     History of Present Illness:  Donnel Sauce is a 80 y o  female with a PMH of atrial fibrillation, hypertension, hyperlipidemia, metastatic lung cancer, hypothyroidism CVA, anemia who presents with COVID-19 infection  Patient presents to the emergency department after complaining of increased shortness of breath and decreased oxygen saturations at her assisted living facility  Patient was recently discharged from [facility] at which time talks were had with patient and family regarding hospice care  Per notes, patient indicated that is she clinically deteriorated she would prefer hospice management  Currently she is complaining of cough, and shortness of breath  She is unsure of sick contact  States that she did test positive on Monday and developed worsening shortness of breath since then  Patient was vaccinated with Pfizer vaccine multiple months ago  In the emergency department requiring 6 L of nasal cannula oxygen decreased to 5 L  Spoke with son Noe regarding goals of care of patient  He stated that he would like conservative management which was also opted for by his mother, however if clinical deterioration due to transition to comfort care at her request     Review of Systems:  Review of Systems   Constitutional: Positive for activity change, appetite change, chills and fatigue  Negative for fever and unexpected weight change  Respiratory: Positive for cough and shortness of breath  Negative for chest tightness  Cardiovascular: Negative for chest pain and palpitations  Gastrointestinal: Negative for abdominal pain, diarrhea, nausea and vomiting  Genitourinary: Negative for decreased urine volume, dysuria, frequency and urgency  Musculoskeletal: Positive for myalgias  Negative for arthralgias  Neurological: Positive for weakness  Negative for dizziness, syncope, light-headedness and headaches  All other systems reviewed and are negative        Past Medical and Surgical History:   Past Medical History:   Diagnosis Date  Atheroma of artery     aorta    History of stroke     bifrontal CVA    Hypertension     Hypothyroidism     Intrahepatic bile duct stones     Irritable bowel syndrome     Murmur     Stroke [facility])        Past Surgical History:   Procedure Laterality Date    APPENDECTOMY      CARDIAC SURGERY      reveal linq cardiac monitor 5-26-15    CARPAL TUNNEL RELEASE      CHOLECYSTECTOMY      ERCP N/A 3/21/2016    Procedure: ENDOSCOPIC RETROGRADE CHOLANGIOPANCREATOGRAPHY (ERCP); Surgeon: Dinorah Rae MD;  Location: [facility]; Service:     IR BIOPSY LUNG  8/24/2021    [place] EGD TRANSORAL BIOPSY SINGLE/MULTIPLE N/A 3/21/2016    Procedure: RADIAL ENDOSCOPIC U/S;  Surgeon: Dinorah Rae MD;  [facility]: [facility]; Service: Gastroenterology    [place] ESOPHAGOGASTRODUODENOSCOPY TRANSORAL DIAGNOSTIC N/A 5/12/2016    Procedure: ESOPHAGOGASTRODUODENOSCOPY (EGD); Surgeon: Dinorah Rae MD;  [facility]: [facility]; Service: Gastroenterology    TONSILLECTOMY         Meds/Allergies:  Prior to Admission medications    Medication Sig Start Date End Date Taking? Authorizing Provider   acetaminophen (TYLENOL) 325 mg tablet Take 3 tablets (975 mg total) by mouth every 8 (eight) hours 8/30/21   Chiquita Mota MD   amLODIPine (NORVASC) 5 mg tablet 7 5 mg oral once daily(take 1 tablet of 5 mg and 1 tablet of 2 5 mg 3/22/16   Kat Ford MD   atorvastatin (LIPITOR) 40 mg tablet Take 40 mg by mouth daily  Historical Provider, MD   bisacodyl (DULCOLAX) 10 mg suppository Insert 1 suppository (10 mg total) into the rectum daily as needed (constipation per bowel protocol) 8/30/21   Chiquita Mota MD   docusate sodium (COLACE) 100 mg capsule Take 1 capsule (100 mg total) by mouth daily 8/31/21   Chiquita Mota MD   levothyroxine 100 mcg tablet Take 100 mcg by mouth daily      Historical Provider, MD   lidocaine (LIDODERM) 5 % Apply 1 patch topically daily Remove & Discard patch within 12 hours or as directed by MD 8/31/21   Don Saavedra MD   melatonin 3 mg Take 1 tablet (3 mg total) by mouth daily at bedtime 8/30/21   Don Saavedra MD   Multiple Vitamins-Minerals (PRESERVISION AREDS 2 PO) Take by mouth daily  Historical Provider, MD   oxyCODONE (ROXICODONE) 5 mg immediate release tablet Take 0 5 tablets (2 5 mg total) by mouth every 4 (four) hours as needed for moderate pain or severe painMax Daily Amount: 15 mg 8/30/21   Krystin Angeles PA-C   pantoprazole (PROTONIX) 40 mg tablet Take 1 tablet (40 mg total) by mouth daily in the early morning 8/31/21   Don Saavedra MD   rivaroxaban Vipul Christopher) tablet Take 15 mg by mouth daily  Historical Provider, MD     I have reveiwed home medications using records provided by [facility]  Allergies: Allergies   Allergen Reactions    Tetanus Toxoids        Social History:  Marital Status:    Occupation: unknown   Patient Pre-hospital Living Situation: Assisted Living  Patient Pre-hospital Level of Mobility: walks with walker  Patient Pre-hospital Diet Restrictions: none  Substance Use History:   Social History     Substance and Sexual Activity   Alcohol Use Not Currently     Social History     Tobacco Use   Smoking Status Former Smoker   Smokeless Tobacco Never Used     Social History     Substance and Sexual Activity   Drug Use No       Family History:  Family History   Problem Relation Age of Onset    Stroke Mother        Physical Exam:     Vitals:   Blood Pressure: 134/88 (09/02/21 0530)  Pulse: (!) 106 (09/02/21 0530)  Temperature: 99 2 °F (37 3 °C) (09/02/21 0500)  Temp Source: Oral (09/02/21 0500)  Respirations: 22 (09/02/21 0530)  Height: 5' 2" (157 5 cm) (09/02/21 0454)  Weight - Scale: 66 5 kg (146 lb 9 7 oz) (09/02/21 0454)  SpO2: 94 % (09/02/21 0530)    Physical Exam  Constitutional:       General: She is not in acute distress  Appearance: She is ill-appearing (Frail-appearing)  HENT:      Head: Normocephalic and atraumatic        Mouth/Throat:      Mouth: Mucous membranes are moist       Pharynx: Oropharynx is clear  No oropharyngeal exudate  Eyes:      General:         Right eye: No discharge  Left eye: No discharge  Conjunctiva/sclera: Conjunctivae normal       Pupils: Pupils are equal, round, and reactive to light  Cardiovascular:      Rate and Rhythm: Normal rate  Rhythm irregular  Pulses: Normal pulses  Heart sounds: Normal heart sounds  No murmur heard  Pulmonary:      Effort: Pulmonary effort is normal  No respiratory distress  Breath sounds: Rhonchi and rales present  No wheezing  Abdominal:      General: Abdomen is flat  There is no distension  Palpations: Abdomen is soft  Tenderness: There is no abdominal tenderness  Musculoskeletal:         General: Normal range of motion  Cervical back: Neck supple  No muscular tenderness  Right lower leg: No edema  Left lower leg: No edema  Skin:     General: Skin is warm and dry  Capillary Refill: Capillary refill takes less than 2 seconds  Coloration: Skin is not jaundiced  Neurological:      General: No focal deficit present  Mental Status: She is alert and oriented to person, place, and time  Cranial Nerves: No cranial nerve deficit     Psychiatric:         Mood and Affect: Mood normal           Additional Data:     Lab Results:  Results from last 7 days   Lab Units 09/02/21  0502   WBC Thousand/uL 8 08   HEMOGLOBIN g/dL 11 1*   HEMATOCRIT % 35 3   PLATELETS Thousands/uL 303   NEUTROS PCT % 86*   LYMPHS PCT % 10*   MONOS PCT % 4   EOS PCT % 0     Results from last 7 days   Lab Units 09/02/21  0543   SODIUM mmol/L 136   POTASSIUM mmol/L 3 3*   CHLORIDE mmol/L 99*   CO2 mmol/L 27   BUN mg/dL 19   CREATININE mg/dL 1 15   ANION GAP mmol/L 10   CALCIUM mg/dL 8 9   ALBUMIN g/dL 1 8*   TOTAL BILIRUBIN mg/dL 0 24   ALK PHOS U/L 191*   ALT U/L 31   AST U/L 60*   GLUCOSE RANDOM mg/dL 133     Results from last 7 days   Lab Units 09/02/21  0502   INR  1 09             Results from last 7 days   Lab Units 09/02/21  0502   LACTIC ACID mmol/L 1 5       Imaging: Reviewed radiology reports from this admission including: chest xray  XR chest 1 view portable    (Results Pending)       EKG and Other Studies Reviewed on Admission:   · EKG: Atrial fibrillation    ** Please Note: This note has been constructed using a voice recognition system   **

## 2021-09-02 NOTE — ASSESSMENT & PLAN NOTE
· Presentation: Patient presents from [facility] rehab due to complaints of shortness of breath, cough and hypoxia  Per chart, SpO2 88% on room air  Patient was found to be COVID+ on 08/30/2021 per chart  Patient received Pfizer vaccine  · Will treat according to moderate pathway  · CXR: Large left lung mass noted, peripheral ground-glass opacities  Official read pending  · Check inflammatory markers: CRP pending, D-dimer 2 77,  procalcitonin pending  · Patient did not want remdesivir at this time   · Will start dexamethasone 6mg IV daily x 10 days  · Continue home statin  · Received IV ceftriaxone 1 g in ED  Will hold off on further antibiotics per COVID-19 mod treatment pathway  · Self-proning  · Initiate Lovenox 1 mg/kg q12h per COVID treatment pathway  Hold home Xarelto  · Monitor respiratory status, supplemental nasal cannula oxygen as needed  · Current O2 requirements upon admission: SpO2 94% on 5L  · Palliative care consult   · Please call misti Soni with updates daily

## 2021-09-02 NOTE — ASSESSMENT & PLAN NOTE
· Patient is not on any rate controlling agents  · Home regimen of Xarelto, will change to Lovenox 1 mg/kg q12h per COVID-19 treatment pathway while inpatient

## 2021-09-03 PROCEDURE — 99239 HOSP IP/OBS DSCHRG MGMT >30: CPT | Performed by: PHYSICIAN ASSISTANT

## 2021-09-03 NOTE — DISCHARGE SUMMARY
[facility]  Discharge- Radha Dross 10/5/1928, 80 y o  female MRN: [id number]  Unit/Bed#: S -01 Encounter: [id number]  Primary Care Provider: Batsheva Foreman   Date and time admitted to hospital: 2021  4:51 AM    * Acute respiratory failure due to COVID-19 [facility])  Assessment & Plan  · Patient initially presented to the emergency department for acute hypoxia occurring at nursing home  She was found with O2 saturations of 80% at her nursing home and came into the emergency department crying 6 L nasal cannula  · Patient's code status confirmed at time of admission to the level 3 DNR DNI which is also confirmed by misti Sol over the phone  · Patient transferred up to the floor on 2021 and was noted to be severely hypoxic and immediately requiring 50 L nasal cannula with non-rebreather  · Discussed with patient, and misti Sol over the phone and patient's code status changed to level for comfort care at that time  · Patient passed away peacefully in bed with misti Lovell at bedside    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  Assessment & Plan  · Known history  · Patient     Sepsis [facility])  Assessment & Plan   Likely secondary to COVID-19 infection   Patient     History of stroke  Assessment & Plan  ·      Anemia of chronic disease  Assessment & Plan  ·      PAF (paroxysmal atrial fibrillation)  Assessment & Plan  · Patient is not on any rate controlling agents          Medical Problems     Resolved Problems  Date Reviewed: 9/3/2021    None              Discharging Physician / Practitioner: Bandar Laurent PA-C  PCP: Batsheva Foreman  Admission Date:   Admission Orders (From admission, onward)     Ordered        21 0653  INPATIENT ADMISSION  Once                   Discharge Date: 21    Consultations During Hospital Stay:  · Palliative care     Procedures Performed:   · None    Significant Findings / Test Results:   · Chest x-ray:  Significant patchy infiltrates with left-sided mass    Incidental Findings:   · COVID positive     Test Results Pending at Discharge (will require follow up): · None     Outpatient Tests Requested:  · None     Complications:      Reason for Admission:  COVID-19    Hospital Course:   Angy Gonzalez is a 80 y o  female patient who originally presented to the hospital on 2021 due to acute respiratory failure with hypoxia secondary to COVID-19 infection  Patient initially presented to the emergency department from her nursing facility with oxygen saturations of 88%  She is placed on S L nasal cannula in the emergency department  Patient did test COVID positive on Monday prior to admission  Patient had acute desaturations when being transferred from urgency department to the floor  Noted to have significant tachypnea with accessory muscle usage and requiring 50 L nasal cannula oxygen with non-rebreather  On discussion with patient, she stated that she just wanted to die  She was capable on my exam of making medical decisions  I did call her son Katarina Villasenor was confirmed that patient should be transitioned to hospice if that was her wish  Patient was agreeable to comfort care measures and thus all supplemental oxygen, and medical interventions were removed, and comfort measures were instituted  Patient's son Patricia Berry was at bedside as patient passed away  The patient, initially admitted to the hospital as inpatient, was discharged earlier than expected given the following: patient    Please see above list of diagnoses and related plan for additional information  Condition at Discharge:     Discharge Day Visit / Exam:   * Please refer to separate progress note for these details *    Discussion with Family: Updated  (son) at bedside  Discharge instructions/Information to patient and family:   See after visit summary for information provided to patient and family        Provisions for Follow-Up Care:  See after visit summary for information related to follow-up care and any pertinent home health orders  Disposition:   Other:      Planned Readmission: none      Discharge Statement:  I spent 60 minutes discharging the patient  This time was spent on the day of discharge  I had direct contact with the patient on the day of discharge  Greater than 50% of the total time was spent examining patient, answering all patient questions, arranging and discussing plan of care with patient as well as directly providing post-discharge instructions  Additional time then spent on discharge activities  Discharge Medications:  See after visit summary for reconciled discharge medications provided to patient and/or family        **Please Note: This note may have been constructed using a voice recognition system**

## 2021-09-03 NOTE — UTILIZATION REVIEW
Initial Clinical Review    Admission: Date/Time/Statement:   Admission Orders (From admission, onward)     Ordered        09/02/21 0653  INPATIENT ADMISSION  Once                   Orders Placed This Encounter   Procedures    INPATIENT ADMISSION     Standing Status:   Standing     Number of Occurrences:   1     Order Specific Question:   Level of Care     Answer:   Med Surg [16]     Order Specific Question:   Estimated length of stay     Answer:   More than 2 Midnights     Order Specific Question:   Certification     Answer:   I certify that inpatient services are medically necessary for this patient for a duration of greater than two midnights  See H&P and MD Progress Notes for additional information about the patient's course of treatment  ED Arrival Information     Expected Arrival Acuity    - 9/2/2021 04:51 Urgent         Means of arrival Escorted by Service Admission type    Ambulance [facility] Urgent         Arrival complaint            Chief Complaint   Patient presents with    Shortness of Breath     Per EMS: Covid+ as of 8/30, has lung cancer (not currently being treated)  Pt c/o SOB and cough, worsening over the last few days  Was found to be 88% on RA that improved to 96% on 6L NC  Initial Presentation: this is a 80year old female from [facility] rehab  to ED via ems admitted inpatient due to acute respiratory failure due to [address]  Tested Positive COVID 8/30/2021  Has metastatic lung cancer to liver  Presented due to worsening shortness of breath starting few days prior to arrival, today sat to 88% room air  On exam rales  COVID +/  NT-proBNP 6185  K 3 3  D dimer 2 77  CxR showed pneumonia  Placed on oxygen 6 liters and to titrate for sat > 90%  Start moderate COVID pathway:  Remdesivir, Decadron, continue home statin, therapeutic lovenox, hold home Xarelto    Palliative care consulted       9/2/2021 per Palliative cre- Patient has acute respiratory failure due to COVID 19/Sepsis/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  Discussed goals of care and will be disease focused care but DNR/DNI  Discussed comfort care and to have face time family meeting on 9/3 at 10 am      2021 2350 -        ED Triage Vitals   Temperature Pulse Respirations Blood Pressure SpO2   21 0500 21 0454 21 0454 21 04521   99 2 °F (37 3 °C) 100 18 (!) 173/94 96 %      Temp Source Heart Rate Source Patient Position - Orthostatic VS BP Location FiO2 (%)   21 0454 21 04521   Oral Monitor Sitting Left arm 100      Pain Score       21 0515       No Pain          Wt Readings from Last 1 Encounters:   21 66 5 kg (146 lb 9 7 oz)     Additional Vital Signs:   21 2234  --  --  --  --  --  85 %Abnormal   100  --  50 L/min  --  High flow nasal cannula  --  --   21 2226  --  --  --  --  --  --  --  --  --  --  --  HFNC prongs  --   217  --  114Abnormal   30Abnormal   136/58  83  87 %Abnormal   --  60  --  10 L/min  Nasal cannula  --  Lying   21  --  --  --  --  --  92 %  --  80  --  15 L/min  Non-rebreather mask  --  --   21 1900  --  106Abnormal   31Abnormal   146/75  --  90 %  --  80  --  15 L/min  Non-rebreather mask  --  Sitting   21 183  --  116Abnormal   35Abnormal   --  --  87 %Abnormal   --  80  --  15 L/min  Non-rebreather mask   --  --   O2 Device: Pt on Nasal Cannula 6 L as well as the nonrebreather at 21 1830   21 1249  --  84  32Abnormal   136/59  --  90 %  --  44  --  6 L/min  Nasal cannula  --  Sitting   21  --  --  --  137/88  --  --  --  --  --  --  --  --  --   21 0530  --  106Abnormal   22  134/88  106  94 %  --  40  --  5 L/min  Nasal cannula           Pertinent Labs/Diagnostic Test Results:   2021 CxR - New right pulmonary opacities, which may represent pneumonia in the appropriate clinical setting  Unchanged left lung mass  Small left pleural effusion    Left axillary soft tissue, seen to advantage on prior CT    9/2/2021 ECG Atrial fibrillation with rapid ventricular response with premature ventricular or aberrantly conducted complexes  Cannot rule out Anterior infarct , age undetermined  Abnormal ECG  When compared with ECG of 24-AUG-2021 09:47,  Atrial fibrillation has replaced Sinus rhythm  Results from last 7 days   Lab Units 09/02/21  0503   SARS-COV-2  Positive*     Results from last 7 days   Lab Units 09/02/21  0502   WBC Thousand/uL 8 08   HEMOGLOBIN g/dL 11 1*   HEMATOCRIT % 35 3   PLATELETS Thousands/uL 303   NEUTROS ABS Thousands/µL 6 91     Results from last 7 days   Lab Units 09/02/21  0543   SODIUM mmol/L 136   POTASSIUM mmol/L 3 3*   CHLORIDE mmol/L 99*   CO2 mmol/L 27   ANION GAP mmol/L 10   BUN mg/dL 19   CREATININE mg/dL 1 15   EGFR ml/min/1 73sq m 41   CALCIUM mg/dL 8 9     Results from last 7 days   Lab Units 09/02/21  0543   AST U/L 60*   ALT U/L 31   ALK PHOS U/L 191*   TOTAL PROTEIN g/dL 6 7   ALBUMIN g/dL 1 8*   TOTAL BILIRUBIN mg/dL 0 24     Results from last 7 days   Lab Units 09/02/21  0543   GLUCOSE RANDOM mg/dL 133     Results from last 7 days   Lab Units 09/02/21  0501   PH KENYETTA  7 347   PCO2 KENYETTA mm Hg 53 9*   PO2 KENYETTA mm Hg 29 1*   HCO3 KENYETTA mmol/L 28 9   BASE EXC KENYETTA mmol/L 2 3   O2 CONTENT KENYETTA ml/dL 8 0   O2 HGB, VENOUS % 47 2*     Results from last 7 days   Lab Units 09/02/21  0543   CK TOTAL U/L 50     Results from last 7 days   Lab Units 09/02/21  0543   TROPONIN I ng/mL 0 04     Results from last 7 days   Lab Units 09/02/21  0502   D-DIMER QUANTITATIVE ug/ml FEU 2 77*     Results from last 7 days   Lab Units 09/02/21  0502   PROTIME seconds 14 2   INR  1 09   PTT seconds 45*     Results from last 7 days   Lab Units 09/02/21  1301   PROCALCITONIN ng/ml 0 17     Results from last 7 days   Lab Units 09/02/21  0502   LACTIC ACID mmol/L 1 5     Results from last 7 days Lab Units 09/02/21  0543   NT-PRO BNP pg/mL 6,185*     Results from last 7 days   Lab Units 09/02/21  0543   CRP mg/L 123 5*     Results from last 7 days   Lab Units 09/02/21  0502 09/02/21  0501   BLOOD CULTURE  Received in Microbiology Lab  Culture in Progress  Received in Microbiology Lab  Culture in Progress         ED Treatment:   Medication Administration from 09/02/2021 0451 to 09/02/2021 2101       Date/Time Order Dose Route Action Action by Comments     09/02/2021 0510 ceftriaxone (ROCEPHIN) 1 g/50 mL in dextrose IVPB 1,000 mg Intravenous Wil[place] 37 Jimi Pitt RN      09/02/2021 7809 levothyroxine tablet 100 mcg 100 mcg Oral Given Priti REECE [facility]      09/02/2021 0834 docusate sodium (COLACE) capsule 100 mg 100 mg Oral Given Priti SHANELL Arielsue      09/02/2021 0909 atorvastatin (LIPITOR) tablet 40 mg 40 mg Oral Given Priti SHANELL [facility]      09/02/2021 0904 amLODIPine (NORVASC) tablet 7 5 mg 7 5 mg [facility]      09/02/2021 0835 dexamethasone (DECADRON) injection 6 mg 6 mg [facility]      09/02/2021 2015 enoxaparin (LOVENOX) subcutaneous injection 70 mg 70 mg Subcutaneous Given Pastor Torres RN      09/02/2021 0908 enoxaparin (LOVENOX) subcutaneous injection 70 mg 70 mg Subcutaneous Given Priti SHANELL Arielsue      09/02/2021 0835 potassium chloride (K-DUR,KLOR-CON) CR tablet 40 mEq 40 mEq Oral Given Priti SHANELL [facility]      09/02/2021 0834 pantoprazole (PROTONIX) EC tablet 40 mg 40 mg Oral [facility]      09/02/2021 1154 remdesivir (Veklury) 200 mg in sodium chloride 0 9 % 250 mL IVPB 200 mg Intravenous New Bag Priti REECE [facility]eldash      09/02/2021 2015 benzonatate (TESSALON PERLES) capsule 200 mg 200 mg Oral Given Pastor Torres RN      09/02/2021 2042 albuterol (PROVENTIL HFA,VENTOLIN HFA) inhaler 2 puff 2 puff Inhalation Given Pastor Torres RN      09/02/2021 1806 albuterol (PROVENTIL HFA,VENTOLIN HFA) inhaler 2 puff 2 puff Inhalation Given Priti Tian         Past Medical History:   Diagnosis Date    Atheroma of artery     aorta    History of stroke     bifrontal CVA    Hypertension     Hypothyroidism     Intrahepatic bile duct stones     Irritable bowel syndrome     Murmur     Stroke [facility])      Present on Admission:   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   PAF (paroxysmal atrial fibrillation)   Anemia of chronic disease      Admitting Diagnosis: SOB (shortness of breath) [R06 02]  Counseling regarding goals of care [Z71 89]  Non-small cell lung cancer metastatic to liver ([facility] [address] ) [C34 90, C78 7]  COVID-19 [U07 1]  Acute respiratory failure due to COVID-19 ([facility] [address] ) [U07 1, J96 00]  Age/Sex: 80 y o  female  Admission Orders:  9/2/2021 0653 inpatient   Scheduled Medications:  Medication    LORazepam (ATIVAN) injection 1 mg  IV prn - used x 1 9/2     HYDROmorphone (DILAUDID) injection 1 mg  IV prn - used x 1 9/2     albuterol (PROVENTIL HFA,VENTOLIN HFA) inhaler 2 puff - prn - used x 2     benzonatate (TESSALON PERLES) capsule 200 mg  Po TID    remdesivir (Veklury) 100 mg in sodium chloride 0 9 % 250 mL IVPB daily     ceftriaxone (ROCEPHIN) 1 g/50 mL in dextrose IVPB  Every 24 hours    pantoprazole (PROTONIX) EC tablet 40 mg po daily     enoxaparin (LOVENOX) subcutaneous injection 70 mg 1 mg/KG SC every 12 hours   dexamethasone (DECADRON) injection 6 mg IV daily     acetaminophen (TYLENOL) tablet 650 mg    oxyCODONE (ROXICODONE) IR tablet 2 5 mg prn - used x 1 9/2     amLODIPine (NORVASC) tablet 7 5 mg  Po daily    atorvastatin (LIPITOR) tablet 40 mg  Po daily     docusate sodium (COLACE) capsule 100 mg po daily     levothyroxine tablet 100 mcg po daily       IP CONSULT TO PALLIATIVE CARE    Network Utilization Review Department  ATTENTION: Please call with any questions or concerns to [phone] and carefully listen to the prompts so that you are directed to the right person   All voicemails are confidential   Re stevenson requests for admission clinical reviews, approved or denied determinations and any other requests to dedicated fax number below belonging to the campus where the patient is receiving treatment   List of dedicated fax numbers for the Facilities:  [address] DENIALS (Administrative/Medical Necessity) [phone]   [address] (Maternity/NICU/Pediatrics) [phone]   [address] [facility] [address] [id number] [place] [address] [place] Zoila [phone] [place] [address] [phone]

## 2021-09-03 NOTE — ASSESSMENT & PLAN NOTE
· Patient initially presented to the emergency department for acute hypoxia occurring at nursing home  She was found with O2 saturations of 80% at her nursing home and came into the emergency department crying 6 L nasal cannula  · Patient's code status confirmed at time of admission to the level 3 DNR DNI which is also confirmed by misti Ceja over the phone  · Patient transferred up to the floor on 09/03/2021 and was noted to be severely hypoxic and immediately requiring 50 L nasal cannula with non-rebreather  · Discussed with patient, and misti Ceja over the phone and patient's code status changed to level for comfort care at that time    · Patient passed away peacefully in bed with misti Hatch at bedside

## 2021-09-03 NOTE — QUICK NOTE
Called to bedside to evaluate patient for increased work of breathing, and noted hypoxia  She had significantly elevated supplemental oxygen requirements including 50 L nasal cannula/high-flow with oxygen requirements still in the low to mid 80s  In conversation with the patient stated I just want to [address]  Patient is alert and oriented x4 and is able to make medical decisions on my evaluation  Discussed with son Ananya Figueredo who is also in agreement with patient being moved to comfort care  He will come into the hospital now to be with his mother  Patient be transitioned to [facility] for comfort care with removal of all necessary medical interventions at this time  Dilaudid 1 mg q 1 hour ordered, Ativan 1 mg q 1 hour ordered for air hunger  Will increase as needed

## 2021-09-03 NOTE — NURSING NOTE
Received pt from ED  At 2135, pt in respiratory distress  Unable to get 02 sat above 80% on non-rebreather mask, respiratory therapy notified, and at bedside  continued with mid-zachary and high-zachary 02  Pt continued with respiratory distress, verbalized didn't want to go on, wants to just go to sleep, too hard to breath  Pt has DNR/DNI order  Job [facility] notified, came to bedside to eval pt  Family notified,  Pt placed on comfort measures per family request  Pt's son at bedside, all medical equipment removed per family request  Family remains at bedside  Continue to provide comfort measures

## 2021-09-03 NOTE — DEATH NOTE
INPATIENT DEATH NOTE  Gokul Carballo 80 y o  female MRN: [id number]  Unit/Bed#: S -01 Encounter: [id number]    Date, Time and Cause of Death    Date of Death: 21  Time of Death: 11:50 PM  Preliminary Cause of Death: Cardiopulmonary arrest ([facility] [address] )  Entered by: La Barr PA-C [JP1 1]     Attribution     JP1 1 Jerrod Rao PA-C 21 23:56           Patient's Information  Pronounced by: La Barr PA-C  Did the patient's death occur in the ED?: No  Did the patient's death occur in the OR?: No  Did the patient's death occur less than 10 days post-op?: No  Did the patient's death occur within 24 hours of admission?: Yes  Was code status DNR at the time of death?: Yes    PHYSICAL EXAM:  Unresponsive to noxious stimuli, Spontaneous respirations absent, Breath sounds absent, Carotid pulse absent, Heart sounds absent, Pupillary light reflex absent and Corneal blink reflex absent    Medical Examiner notification criteria:  Patient  within 24 hours of arrival to hospital   Medical Examiner's office notified?:  No, does not meet ME notification criteria   Medical Examiner accepted case?:  No  Name of Medical Examiner: N/A family decline autopsy     Family Notification  Was the family notified?: Yes  Date Notified: 21  Time Notified: 7040  Notified by: Jerrod Rao PA-C  Name of Family Notified of Death: misti Berry [place]   Relationship to Patient: Son  Family Notification Route:  At bedside  Was the family told to contact a  home?: Yes  Name of  Home[de-identified] [facility]    Autopsy Options:  Post-mortem examination declined by next of kin    Primary Service Attending Physician notified?:  yes - Attending:  Corby García DO    Physician/Resident responsible for completing Discharge Summary:  Dr Corby García MD

## 2021-09-07 LAB
BACTERIA BLD CULT: NORMAL
BACTERIA BLD CULT: NORMAL

## 2021-09-07 NOTE — UTILIZATION REVIEW
Initial Clinical Review     Admission: Date/Time/Statement:       Admission Orders (From admission, onward)              Ordered          09/02/21 5681   INPATIENT ADMISSION  Once                             Orders Placed This Encounter   Procedures    INPATIENT ADMISSION       Standing Status:   Standing       Number of Occurrences:   1       Order Specific Question:   Level of Care       Answer:   Med Surg [16]       Order Specific Question:   Estimated length of stay       Answer:   More than 2 Midnights       Order Specific Question:   Certification       Answer:   I certify that inpatient services are medically necessary for this patient for a duration of greater than two midnights  See H&P and MD Progress Notes for additional information about the patient's course of treatment             ED Arrival Information      Expected Arrival Acuity     - 9/2/2021 04:51 Urgent           Means of arrival Escorted by Service Admission type     Ambulance [facility] Hospitalist Urgent           Arrival complaint                     Chief Complaint   Patient presents with    Shortness of Breath       Per EMS: Covid+ as of 8/30, has lung cancer (not currently being treated)  Pt c/o SOB and cough, worsening over the last few days  Was found to be 88% on RA that improved to 96% on 6L NC          Initial Presentation: this is a 80year old female from [facility] rehab  to ED via ems admitted inpatient due to acute respiratory failure due to [address]  Tested Positive COVID 8/30/2021  Has metastatic lung cancer to liver  Presented due to worsening shortness of breath starting few days prior to arrival, today sat to 88% room air  On exam rales  COVID +/  NT-proBNP 6185  K 3 3  D dimer 2 77  CxR showed pneumonia  Placed on oxygen 6 liters and to titrate for sat > 90%  Start moderate COVID pathway:  Remdesivir, Decadron, continue home statin, therapeutic lovenox, hold home Xarelto  Palliative care consulted         2021 per Palliative cre- Patient has acute respiratory failure due to COVID 19/Sepsis/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  Discussed goals of care and will be disease focused care but DNR/DNI    Discussed comfort care and to have face time family meeting on 9/3 at 10 am       2021 2350 -               ED Triage Vitals   Temperature Pulse Respirations Blood Pressure SpO2   21 0500 21 0454 21 0454 21 04521   99 2 °F (37 3 °C) 100 18 (!) 173/94 96 %       Temp Source Heart Rate Source Patient Position - Orthostatic VS BP Location FiO2 (%)   21   Oral Monitor Sitting Left arm 100       Pain Score           21 0515           No Pain                  Wt Readings from Last 1 Encounters:   21 66 5 kg (146 lb 9 7 oz)      Additional Vital Signs:   21   --   --   --   --   --   85 %Abnormal    100   --   50 L/min   --   High flow nasal cannula   --   --   21   --   --   --   --   --   --   --   --   --   --   --   HFNC prongs   --   21   --   114Abnormal    30Abnormal    136/58   83   87 %Abnormal    --   60   --   10 L/min   Nasal cannula   --   Lying   21   --   --   --   --   --   92 %   --   80   --   15 L/min   Non-rebreather mask   --   --   21 190   --   106Abnormal    31Abnormal    146/75   --   90 %   --   80   --   15 L/min   Non-rebreather mask   --   Sitting   21   --   116Abnormal    35Abnormal    --   --   87 %Abnormal    --   80   --   15 L/min   Non-rebreather mask    --   --   O2 Device: Pt on Nasal Cannula 6 L as well as the nonrebreather at 210   21 1249   --   84   32Abnormal    136/59   --   90 %   --   44   --   6 L/min   Nasal cannula   --   Sitting   21   --   --   --   137/88   --   --   --   --   --   --   --   --   --   21 0530   --   106Abnormal   22   134/88   106   94 %   --   40   --   5 L/min   Nasal cannula                 Pertinent Labs/Diagnostic Test Results:   9/2/2021 CxR - New right pulmonary opacities, which may represent pneumonia in the appropriate clinical setting  Unchanged left lung mass  Small left pleural effusion    Left axillary soft tissue, seen to advantage on prior CT     9/2/2021 ECG Atrial fibrillation with rapid ventricular response with premature ventricular or aberrantly conducted complexes  Cannot rule out Anterior infarct , age undetermined  Abnormal ECG  When compared with ECG of 24-AUG-2021 09:47,  Atrial fibrillation has replaced Sinus rhythm       Results from last 7 days   Lab Units 09/02/21  0503   SARS-COV-2   Positive*           Results from last 7 days   Lab Units 09/02/21  0502   WBC Thousand/uL 8 08   HEMOGLOBIN g/dL 11 1*   HEMATOCRIT % 35 3   PLATELETS Thousands/uL 303   NEUTROS ABS Thousands/µL 6 91           Results from last 7 days   Lab Units 09/02/21  0543   SODIUM mmol/L 136   POTASSIUM mmol/L 3 3*   CHLORIDE mmol/L 99*   CO2 mmol/L 27   ANION GAP mmol/L 10   BUN mg/dL 19   CREATININE mg/dL 1 15   EGFR ml/min/1 73sq m 41   CALCIUM mg/dL 8 9           Results from last 7 days   Lab Units 09/02/21  0543   AST U/L 60*   ALT U/L 31   ALK PHOS U/L 191*   TOTAL PROTEIN g/dL 6 7   ALBUMIN g/dL 1 8*   TOTAL BILIRUBIN mg/dL 0 24           Results from last 7 days   Lab Units 09/02/21  0543   GLUCOSE RANDOM mg/dL 133           Results from last 7 days   Lab Units 09/02/21  0501   PH KENYETTA   7 347   PCO2 KENYETTA mm Hg 53 9*   PO2 KENYETTA mm Hg 29 1*   HCO3 KENYETTA mmol/L 28 9   BASE EXC KENYETTA mmol/L 2 3   O2 CONTENT KENYETTA ml/dL 8 0   O2 HGB, VENOUS % 47 2*           Results from last 7 days   Lab Units 09/02/21  0543   CK TOTAL U/L 50           Results from last 7 days   Lab Units 09/02/21  0543   TROPONIN I ng/mL 0 04           Results from last 7 days   Lab Units 09/02/21  0502   D-DIMER QUANTITATIVE ug/ml FEU 2 77*           Results from last 7 days   Lab Units 09/02/21  0502   PROTIME seconds 14 2   INR   1 09   PTT seconds 45*           Results from last 7 days   Lab Units 09/02/21  1301   PROCALCITONIN ng/ml 0 17           Results from last 7 days   Lab Units 09/02/21  0502   LACTIC ACID mmol/L 1 5           Results from last 7 days   Lab Units 09/02/21  0543   NT-PRO BNP pg/mL 6,185*           Results from last 7 days   Lab Units 09/02/21  0543   CRP mg/L 123 5*            Results from last 7 days   Lab Units 09/02/21  0502 09/02/21  0501   BLOOD CULTURE   Received in Microbiology Lab  Culture in Progress  Received in Microbiology Lab   Culture in Progress          ED Treatment:               Medication Administration from 09/02/2021 0451 to 09/02/2021 2101        Date/Time Order Dose Route Action Action by Comments       09/02/2021 0510 ceftriaxone (ROCEPHIN) 1 g/50 mL in dextrose IVPB 1,000 mg Intravenous New Bag Laure Montanez, JENNIFER         09/02/2021 0902 levothyroxine tablet 100 mcg 100 mcg Oral Given Priti REECE [facility]         09/02/2021 0834 docusate sodium (COLACE) capsule 100 mg 100 mg Oral Given Priti REECE [facility]         09/02/2021 0909 atorvastatin (LIPITOR) tablet 40 mg 40 mg Oral Given Priti REECE [facility]         09/02/2021 0904 amLODIPine (NORVASC) tablet 7 5 mg 7 5 mg Oral Given Priti REECE [facility]         09/02/2021 0835 dexamethasone (DECADRON) injection 6 mg 6 mg Intravenous Given Priti E [facility]         09/02/2021 2015 enoxaparin (LOVENOX) subcutaneous injection 70 mg 70 mg Subcutaneous Given Luis eFlipe Arias, JENNIFER         09/02/2021 0908 enoxaparin (LOVENOX) subcutaneous injection 70 mg 70 mg Subcutaneous Given Priti REECE [facility]         09/02/2021 0835 potassium chloride (K-DUR,KLOR-CON) CR tablet 40 mEq 40 mEq Oral Given Pritiangella Deleon         09/02/2021 0834 pantoprazole (PROTONIX) EC tablet 40 mg 40 mg Oral Given Priti SHANELL [facility]         09/02/2021 1154 remdesivir (Veklury) 200 mg in sodium chloride 0 9 % 250 mL IVPB 200 mg Intravenous New Bag Priti Tian         09/02/2021 2015 benzonatate (TESSALON PERLES) capsule 200 mg 200 mg Oral Given Allyn Santos RN         09/02/2021 2042 albuterol (PROVENTIL HFA,VENTOLIN HFA) inhaler 2 puff 2 puff Inhalation Given Allyn Santos RN         09/02/2021 1806 albuterol (PROVENTIL HFA,VENTOLIN HFA) inhaler 2 puff 2 puff Inhalation Given Priti Tian            Medical History        Past Medical History:   Diagnosis Date    Atheroma of artery       aorta    History of stroke       bifrontal CVA    Hypertension      Hypothyroidism      Intrahepatic bile duct stones      Irritable bowel syndrome      Murmur      Stroke [facility])           Present on Admission:   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   PAF (paroxysmal atrial fibrillation)   Anemia of chronic disease        Admitting Diagnosis: SOB (shortness of breath) [R06 02]  Counseling regarding goals of care [Z71 89]  Non-small cell lung cancer metastatic to liver ([facility] ) [C34 90, C78 7]  COVID-19 [U07 1]  Acute respiratory failure due to COVID-19 ([facility] ) [U07 1, J96 00]  Age/Sex: 80 y o  female  Admission Orders:  9/2/2021 0653 inpatient   Scheduled Medications:      Medication    LORazepam (ATIVAN) injection 1 mg  IV prn - used x 1 9/2     HYDROmorphone (DILAUDID) injection 1 mg  IV prn - used x 1 9/2     albuterol (PROVENTIL HFA,VENTOLIN HFA) inhaler 2 puff - prn - used x 2     benzonatate (TESSALON PERLES) capsule 200 mg  Po TID    remdesivir (Veklury) 100 mg in sodium chloride 0 9 % 250 mL IVPB daily     ceftriaxone (ROCEPHIN) 1 g/50 mL in dextrose IVPB  Every 24 hours    pantoprazole (PROTONIX) EC tablet 40 mg po daily     enoxaparin (LOVENOX) subcutaneous injection 70 mg 1 mg/KG SC every 12 hours       dexamethasone (DECADRON) injection 6 mg IV daily     acetaminophen (TYLENOL) tablet 650 mg    oxyCODONE (ROXICODONE) IR tablet 2 5 mg prn - used x 1 9/2     amLODIPine (NORVASC) tablet 7 5 mg  Po daily    atorvastatin (LIPITOR) tablet 40 mg  Po daily     docusate sodium (COLACE) capsule 100 mg po daily     levothyroxine tablet 100 mcg po daily         IP CONSULT TO PALLIATIVE CARE     Network Utilization Review Department  ATTENTION: Please call with any questions or concerns to [phone] and carefully listen to the prompts so that you are directed to the right person  All voicemails are confidential   Josey Vázquez all requests for admission clinical reviews, approved or denied determinations and any other requests to dedicated fax number below belonging to the campus where the patient is receiving treatment   List of dedicated fax numbers for the Facilities:  [address] DENIALS (Administrative/Medical Necessity) [phone]   [address] (Maternity/NICU/Pediatrics) [phone]   [address] Miah Patel [phone] [place] [address] [place] Zoila Martinez 1481 [address] [place] [address] [phone]

## 2021-09-10 NOTE — CONSULTS
Consultation - Palliative & Supportive Care   Iron Santana 80 y o  female MRN: [id number]  Unit/Bed#: [facility] 919-01 Encounter: [id number]      Physician Requesting Consult: Mukesh Mckeon MD  Reason for Consult / Principal Problem: goals of care      Assessment/Plan:  1  Metastatic disease  2  Lung mass  3  Abdominal pain  4  Nausea  5  ASHLEY  6  Constipation  7  Deconditioning, debility, ambulatory dysfunction  8  H/o duodenitis, ampulla of Vater  9  Goals of care  -continue current medical care/optimization  -sons wish to speak with each other further regarding plan of care moving forward, office contact provided  -confirmed level 3 code status, DNAR/DNI  -sons wish to pursue SNF/rehab with option of transition to LTC at this time, prefer facility near [facility] d/w   -start bowel regimen   -senna 8 6mg nightly   -prn miralax daily   -prn dulcolax suppository daily  -delirium precautions - Please minimize interruptions and prioritize sleep at night  No TV nor screen time at night  Shades drawn at night  During day, shades up, minimize napping, and encourage meals in chair  -d/w primary via tigertext  -will follow peripherally as needed, will f/u Monday 8/23/21    Met with patient  She was eating breakfast  Introduced palliative care and service  Patient unaware of imaging findings and asked that I speak with her son/Kevon  She admitted to no longer being able to live independently, stating "I forget my meds, told my sons I didn't think I could live alone anymore"  Patient states she feels constipated and feels like she has to go, but having trouble doing so  She is oriented to self and place, but confused on time and specifics of hospitalization  She prefers I let her finish her breakfast and check with her son  Spoke with son/Kevon by phone  Introduced palliative care service, provided contact information  Son aware patient does not know details of imaging findings   He states he has seen a decline in patient over last 2 years, more significantly in last year  She is no longer interested in doing things she enjoyed  We briefly discussed her conditions and options of pursuing further imaging/diagnostics/invasive interventions/treatments or focusing on comfort given age and current imaging finding  Son admits that patient did not want to pursue further w/u or doctor's appointments after last HemOnc appointment for anemia in 2018  We briefly discussed SNF/rehab with option of transitioning to LTC  We also briefly discussed option of hospice if patient/family does not want to pursue further w/u or possible treatments if offered  We discussed that option of hospice could be pursued once in a facility  We also discussed POLST form to keep patient from returning to hospital if that is what patient/family wishes  Son wishes to discuss with siblings before making any definitive decisions  He is clear that at this time they wish to pursue SNF/rehab with facility that has option to transition to LTC if unable to tolerate rehab  I let CM know to look for facilities near [place] with this option  Son wishes to discuss further with siblings and patient, and will inform our office if interested in any further discussion with our team  I also encouraged him to continue this discussion with patient's primary team over the weekend once he has had a chance to discuss with his brothers  We will f/u with patient/family on Monday if she remains in hospital  Son appreciative of discussion  Code Status: Level 3 - DNAR and DNI  Advance Directive and Living Will: Yes  , Living Will  Power of :  Not in chart, son/Kevon 1st POA, son/Clark in [place] 2nd  POLST:  No  4 sons- 1 , 1 lives in [place], 1/Clark in [place], 1/Kevon in [address] [place] [address] is contact, sees patient 2x/week  Enjoyed raymundo juarez, but hasn't for over a year    Spiritual, Buddhism - declined spiritual care consult      History of Present Illness   HPI: Briana Carrera is a 80y o  year old female who presents to ED with family c/o nausea, abdominal pain, decreased oral intake, constipation  Son reports that patient seemed more confused  Patient was scheduled for outpatient CT of her abdomen today  Patient unable to recall last BM, thinks it was 2 days ago, but feels like she has to go  CT imaging revealed 7 5cm mass LLL lung concerning for primary malignancy, and a 2 8cm peritoneal soft tissue nodule adjacent to the inferior R hepatic lobe, concerning for metastatic disease  Per notes, family probably not interested in pursuing aggressive measures and  feel patient may need long term care  Per CM note, son interested in pursuing SNF placement upon discharge  Patient reports that she told sons she doesn't think she can live on her own anymore, and she admits that she forgets to take her medications  Patient followed [facility] for anemia, last seen 3/2j9/2018 and note reports CT c/a/p without evidence of malignancy at that time  During that office visit, patient did not want to pursue additional doctor appointments  Patient had EGD with biopsy 3/2016 that revealed duodenitis with ulceration, no malignancy noted  Palliative consulted for goals of care  Review of Systems   Constitutional: Positive for activity change, appetite change and fatigue  Cardiovascular: Positive for leg swelling  Gastrointestinal: Positive for abdominal pain, constipation and nausea  Musculoskeletal: Positive for arthralgias, gait problem and myalgias  Skin: Positive for pallor  Neurological: Positive for weakness  Psychiatric/Behavioral: Positive for confusion and dysphoric mood  All other systems reviewed and are negative        Historical Information   Past Medical History:   Diagnosis Date    Atheroma of artery     aorta    History of stroke     bifrontal CVA    Hypertension     Hypothyroidism     Intrahepatic bile duct stones     Irritable bowel syndrome     Murmur     Stroke [facility])      Patient Active Problem List   Diagnosis    Hypothyroidism    Hypertension    Irritable bowel syndrome    History of stroke    Atheroma of artery    Transaminitis    Ampulla of Vater obstruction syndrome    Hyperkalemia    ASHLEY (acute kidney injury) ([address] )    Metastatic disease (HCC)    Lung mass    Nausea    PAF (paroxysmal atrial fibrillation) (HCC)    History of malignant neoplasm of ampulla of Vater     Past Surgical History:   Procedure Laterality Date    APPENDECTOMY      CARDIAC SURGERY      reveal linq cardiac monitor 5-26-15    CARPAL TUNNEL RELEASE      CHOLECYSTECTOMY      ERCP N/A 3/21/2016    Procedure: ENDOSCOPIC RETROGRADE CHOLANGIOPANCREATOGRAPHY (ERCP); Surgeon: Artie Lau MD;  [facility]: [facility]; Service:     HI EGD TRANSORAL BIOPSY SINGLE/MULTIPLE N/A 3/21/2016    Procedure: RADIAL ENDOSCOPIC U/S;  Surgeon: Artie Lau MD;  [facility]; Service: Gastroenterology    HI ESOPHAGOGASTRODUODENOSCOPY TRANSORAL DIAGNOSTIC N/A 5/12/2016    Procedure: ESOPHAGOGASTRODUODENOSCOPY (EGD); Surgeon: Artie Lau MD;  [facility]; Service: Gastroenterology    TONSILLECTOMY       Social History     Socioeconomic History    Marital status:       Spouse name: None    Number of children: None    Years of education: None    Highest education level: None   Occupational History     Comment: RETIRED   Tobacco Use    Smoking status: Former Smoker    Smokeless tobacco: Never Used   Vaping Use    Vaping Use: Never used   Substance and Sexual Activity    Alcohol use: Not Currently    Drug use: No    Sexual activity: None   Other Topics Concern    None   Social History Narrative    None     Social Determinants of Health     Financial Resource Strain:     Difficulty of Paying Living Expenses:    Food Insecurity:     Worried About Running Out of Food in the Last Year:  Ran Out of Food in the Last Year:    Transportation Needs:     Lack of Transportation (Medical):  Lack of Transportation (Non-Medical):    Physical Activity:     Days of Exercise per Week:     Minutes of Exercise per Session:    Stress:     Feeling of Stress :    Social Connections:     Frequency of Communication with Friends and Family:     Frequency of Social Gatherings with Friends and Family:     Attends Mandaen Services:     Active Member of Clubs or Organizations:     Attends Club or Organization Meetings:     Marital Status:    Intimate Partner Violence:     Fear of Current or Ex-Partner:     Emotionally Abused:     Physically Abused:     Sexually Abused:      Family History   Problem Relation Age of Onset    Stroke Mother        Meds/Allergies   all current active meds have been reviewed and current meds:   Current Facility-Administered Medications   Medication Dose Route Frequency    amLODIPine (NORVASC) tablet 5 mg  5 mg Oral Daily    aspirin chewable tablet 81 mg  81 mg Oral Daily    atorvastatin (LIPITOR) tablet 40 mg  40 mg Oral Daily    bisacodyl (DULCOLAX) rectal suppository 10 mg  10 mg Rectal Daily PRN    levothyroxine tablet 100 mcg  100 mcg Oral Daily    melatonin tablet 3 mg  3 mg Oral HS    multivitamin-minerals (CENTRUM) tablet 1 tablet  1 tablet Oral Daily    ondansetron (ZOFRAN) injection 4 mg  4 mg Intravenous Q4H PRN    polyethylene glycol (MIRALAX) packet 17 g  17 g Oral Daily PRN    rivaroxaban (XARELTO) tablet 15 mg  15 mg Oral HS    senna (SENOKOT) tablet 8 6 mg  1 tablet Oral HS    sodium chloride 0 9 % infusion  75 mL/hr Intravenous Continuous       Allergies   Allergen Reactions    Tetanus Toxoids        I have reviewed the patient's controlled substance dispensing history in the Prescription Drug Monitoring Program in compliance with the [facility] regulations before prescribing any controlled substances       Objective   /50   Pulse 72   Temp 98 9 °F (37 2 °C) (Oral)   Resp (!) 23   Ht 5' 2" (1 575 m)   Wt 77 1 kg (170 lb)   SpO2 90%   BMI 31 09 kg/m²   Physical Exam  Vitals and nursing note reviewed  Constitutional:       General: She is not in acute distress  Appearance: She is not ill-appearing, toxic-appearing or diaphoretic  Comments: Frail, weakened, intermittently confused, pleasant   HENT:      Head: Normocephalic and atraumatic  Mouth/Throat:      Mouth: Mucous membranes are moist       Pharynx: Oropharynx is clear  Eyes:      General: No scleral icterus  Extraocular Movements: Extraocular movements intact  Cardiovascular:      Rate and Rhythm: Normal rate  Pulmonary:      Effort: Pulmonary effort is normal  No respiratory distress  Breath sounds: No stridor  Comments: Dyspnea with conversation  Abdominal:      General: Abdomen is flat  There is no distension  Palpations: Abdomen is soft  Tenderness: There is no abdominal tenderness  Musculoskeletal:         General: Swelling and tenderness present  Cervical back: Normal range of motion  Right lower leg: Edema present  Left lower leg: Edema present  Skin:     General: Skin is warm and dry  Coloration: Skin is pale  Neurological:      General: No focal deficit present  Mental Status: She is alert  Comments: Intermittently confused, on details   Psychiatric:         Mood and Affect: Mood normal          Behavior: Behavior normal       Comments: Pleasant, unaware of imaging findings         Lab Results:   I have personally reviewed pertinent labs  , CBC:   Lab Results   Component Value Date    WBC 9 31 08/20/2021    HGB 8 8 (L) 08/20/2021    HCT 27 3 (L) 08/20/2021    MCV 84 08/20/2021     08/20/2021    MCH 27 1 08/20/2021    MCHC 32 2 08/20/2021    RDW 15 9 (H) 08/20/2021    MPV 12 0 08/20/2021    NRBC 0 08/19/2021   , CMP:   Lab Results   Component Value Date    SODIUM 137 08/20/2021    K 3 2 (L) 08/20/2021     08/20/2021    CO2 27 08/20/2021    BUN 30 (H) 08/20/2021    CREATININE 1 19 08/20/2021    CALCIUM 8 5 08/20/2021    AST 17 08/20/2021    ALT 14 08/20/2021    ALKPHOS 152 (H) 08/20/2021    EGFR 40 08/20/2021   , PT/PTT:No results found for: PT, PTT  Imaging Studies: I have personally reviewed pertinent reports  EKG, Pathology, and Other Studies: I have personally reviewed pertinent reports  QT/QTc: 406/429    Counseling / Coordination of Care  Total floor / unit time spent today 75 minutes  Greater than 50% of total time was spent with the patient and / or family counseling and / or coordination of care  A description of the counseling / coordination of care: current condition, brief discussion of options including hospice, advance care planning discussion, goals of care      Perlita Hopper DO

## 2021-09-12 NOTE — UTILIZATION REVIEW
Notification of Discharge   This is a Notification of Discharge from our facility [address]  Please be advised that this patient has been discharge from our facility  Below you will find the admission and discharge date and time including the patients disposition  UTILIZATION REVIEW CONTACT:  Khushboo Bustamante  Utilization   Network Utilization Review Department  Phone: [phone] x carefully listen to the prompts  All voicemails are confidential   Email: [email or web address]     PHYSICIAN ADVISORY SERVICES:  FOR [id number] REVIEW - MEDICAL NECESSITY DENIAL  Phone: [phone]  Fax: [phone]  Email: [email or web address]     PRESENTATION DATE: 2021  4:51 AM    INPATIENT ADMISSION DATE: 21  6:53 AM   DISCHARGE DATE: 9/3/2021  3:22 AM  DISPOSITION:        IMPORTANT INFORMATION:  Send all requests for admission clinical reviews, approved or denied determinations and any other requests to dedicated fax number below belonging to the campus where the patient is receiving treatment   List of dedicated fax numbers:  [address] DENIALS (Administrative/Medical Necessity) [phone]   [address] (Maternity/NICU/Pediatrics) [phone]   Delon Days [phone]   Malen Motts [phone]   [facility] [phone]   [address] [phone]   [facility]  [phone]   [address] [facility] [address] [facility]  [address] [address] [address] [facility] [phone]
